# Patient Record
Sex: FEMALE | Race: WHITE | NOT HISPANIC OR LATINO | URBAN - METROPOLITAN AREA
[De-identification: names, ages, dates, MRNs, and addresses within clinical notes are randomized per-mention and may not be internally consistent; named-entity substitution may affect disease eponyms.]

---

## 2017-02-22 ENCOUNTER — OFFICE VISIT (OUTPATIENT)
Dept: FAMILY MEDICINE CLINIC | Facility: CLINIC | Age: 57
End: 2017-02-22

## 2017-02-22 VITALS
OXYGEN SATURATION: 98 % | TEMPERATURE: 99 F | BODY MASS INDEX: 27.05 KG/M2 | HEIGHT: 62 IN | DIASTOLIC BLOOD PRESSURE: 70 MMHG | HEART RATE: 106 BPM | SYSTOLIC BLOOD PRESSURE: 110 MMHG | WEIGHT: 147 LBS

## 2017-02-22 DIAGNOSIS — R50.9 FEVER, UNSPECIFIED FEVER CAUSE: Primary | ICD-10-CM

## 2017-02-22 DIAGNOSIS — J06.9 ACUTE URI: ICD-10-CM

## 2017-02-22 LAB
EXPIRATION DATE: NORMAL
FLUAV AG NPH QL: NEGATIVE
FLUBV AG NPH QL: NEGATIVE
INTERNAL CONTROL: NORMAL
Lab: NORMAL

## 2017-02-22 PROCEDURE — 96372 THER/PROPH/DIAG INJ SC/IM: CPT | Performed by: NURSE PRACTITIONER

## 2017-02-22 PROCEDURE — 99213 OFFICE O/P EST LOW 20 MIN: CPT | Performed by: NURSE PRACTITIONER

## 2017-02-22 PROCEDURE — 87804 INFLUENZA ASSAY W/OPTIC: CPT | Performed by: NURSE PRACTITIONER

## 2017-02-22 RX ORDER — TRIAMCINOLONE ACETONIDE 40 MG/ML
40 INJECTION, SUSPENSION INTRA-ARTICULAR; INTRAMUSCULAR ONCE
Status: COMPLETED | OUTPATIENT
Start: 2017-02-22 | End: 2017-02-22

## 2017-02-22 RX ORDER — SUMATRIPTAN AND NAPROXEN SODIUM 85; 500 MG/1; MG/1
TABLET, FILM COATED ORAL
Qty: 30 TABLET | Refills: 0 | Status: SHIPPED | OUTPATIENT
Start: 2017-02-22 | End: 2017-03-20 | Stop reason: SDUPTHER

## 2017-02-22 RX ADMIN — TRIAMCINOLONE ACETONIDE 40 MG: 40 INJECTION, SUSPENSION INTRA-ARTICULAR; INTRAMUSCULAR at 10:43

## 2017-02-22 NOTE — PROGRESS NOTES
"Subjective   Gaby Dobbins is a 56 y.o. female.     History of Present Illness   Upper Respiratory Infection: Patient complains of symptoms of a URI. Symptoms include congestion, cough, sore throat and swollen glands. Onset of symptoms was 4 days ago, unchanged since that time. She also c/o achiness for the past 4 days .  She is drinking plenty of fluids. Evaluation to date: none. Treatment to date: none.        The following portions of the patient's history were reviewed and updated as appropriate: allergies, current medications, past social history and problem list.    Review of Systems   Constitutional: Positive for fatigue.   HENT: Positive for congestion and sinus pressure.    Respiratory: Positive for cough.    All other systems reviewed and are negative.      Objective   Visit Vitals   • /70 (BP Location: Right arm, Patient Position: Sitting)   • Pulse 106   • Temp 99 °F (37.2 °C)   • Ht 62\" (157.5 cm)   • Wt 147 lb (66.7 kg)   • SpO2 98%   • BMI 26.89 kg/m2     Physical Exam   Constitutional: She is oriented to person, place, and time. Vital signs are normal. She appears well-developed and well-nourished. No distress.   HENT:   Head: Normocephalic.   Right Ear: Tympanic membrane normal.   Left Ear: Tympanic membrane normal.   Nose: Rhinorrhea present.   Mouth/Throat: Oropharynx is clear and moist.   Cardiovascular: Normal rate, regular rhythm and normal heart sounds.    Pulmonary/Chest: Effort normal and breath sounds normal.   Lymphadenopathy:     She has no cervical adenopathy.   Neurological: She is alert and oriented to person, place, and time. Gait normal.   Psychiatric: She has a normal mood and affect. Her behavior is normal. Judgment and thought content normal.   Vitals reviewed.      Assessment/Plan   Problem List Items Addressed This Visit        Respiratory    Acute URI    Relevant Medications    triamcinolone acetonide (KENALOG-40) injection 40 mg (Start on 2/22/2017 11:15 AM)       " Other    Fever - Primary    Relevant Orders    POC Influenza A / B        Push fluids rto prn

## 2017-03-20 RX ORDER — SUMATRIPTAN AND NAPROXEN SODIUM 85; 500 MG/1; MG/1
TABLET, FILM COATED ORAL
Qty: 30 TABLET | Refills: 1 | Status: SHIPPED | OUTPATIENT
Start: 2017-03-20 | End: 2017-09-14 | Stop reason: SDUPTHER

## 2017-04-20 ENCOUNTER — OFFICE VISIT (OUTPATIENT)
Dept: FAMILY MEDICINE CLINIC | Facility: CLINIC | Age: 57
End: 2017-04-20

## 2017-04-20 VITALS
RESPIRATION RATE: 14 BRPM | BODY MASS INDEX: 27.12 KG/M2 | TEMPERATURE: 98.2 F | HEIGHT: 62 IN | OXYGEN SATURATION: 98 % | DIASTOLIC BLOOD PRESSURE: 72 MMHG | WEIGHT: 147.4 LBS | HEART RATE: 98 BPM | SYSTOLIC BLOOD PRESSURE: 106 MMHG

## 2017-04-20 DIAGNOSIS — G43.009 MIGRAINE WITHOUT AURA AND WITHOUT STATUS MIGRAINOSUS, NOT INTRACTABLE: ICD-10-CM

## 2017-04-20 DIAGNOSIS — J40 SINOBRONCHITIS: Primary | ICD-10-CM

## 2017-04-20 DIAGNOSIS — H61.21 IMPACTED CERUMEN OF RIGHT EAR: ICD-10-CM

## 2017-04-20 DIAGNOSIS — J32.9 SINOBRONCHITIS: Primary | ICD-10-CM

## 2017-04-20 PROCEDURE — 99213 OFFICE O/P EST LOW 20 MIN: CPT | Performed by: FAMILY MEDICINE

## 2017-04-20 PROCEDURE — 69210 REMOVE IMPACTED EAR WAX UNI: CPT | Performed by: FAMILY MEDICINE

## 2017-04-20 RX ORDER — AMOXICILLIN AND CLAVULANATE POTASSIUM 875; 125 MG/1; MG/1
1 TABLET, FILM COATED ORAL 2 TIMES DAILY
Qty: 20 TABLET | Refills: 0 | Status: SHIPPED | OUTPATIENT
Start: 2017-04-20 | End: 2017-04-30

## 2017-04-20 RX ORDER — SUMATRIPTAN 25 MG/1
25 TABLET, FILM COATED ORAL ONCE AS NEEDED
Qty: 9 TABLET | Refills: 2 | Status: SHIPPED | OUTPATIENT
Start: 2017-04-20 | End: 2017-11-19 | Stop reason: SDUPTHER

## 2017-04-20 RX ORDER — PREDNISONE 20 MG/1
40 TABLET ORAL DAILY
Qty: 10 TABLET | Refills: 0 | Status: SHIPPED | OUTPATIENT
Start: 2017-04-20 | End: 2019-04-10

## 2017-04-20 RX ORDER — SUMATRIPTAN 25 MG/1
TABLET, FILM COATED ORAL
Qty: 9 TABLET | Refills: 1 | Status: CANCELLED | OUTPATIENT
Start: 2017-04-20

## 2017-04-20 NOTE — PROGRESS NOTES
Subjective   Gaby Dobbins is a 56 y.o. female.     Chief Complaint   Patient presents with   • Headache     Patient has headache and congested .    • Dizziness     Patient has ear pain and started feeling dizzy for 5 days.    • Immunizations     Patient is unsure of last tdap.        HPI     Patient presents with 10 days of increasing  Sinus congestion, headache, bilateral ear pressure and pain, drainage.  She's tried some over-the-counter therapies which have not helped.  Her symptoms are getting worse.    The following portions of the patient's history were reviewed and updated as appropriate: allergies, current medications, past family history, past medical history, past social history, past surgical history and problem list.    Review of Systems   HENT: Positive for ear pain.    Neurological: Positive for dizziness and headaches.   All other systems reviewed and are negative.      Objective  Vitals:    04/20/17 1032   BP: 106/72   Pulse: 98   Resp: 14   Temp: 98.2 °F (36.8 °C)   SpO2: 98%        Physical Exam   Constitutional: She is oriented to person, place, and time. She appears well-developed and well-nourished. No distress.   HENT:   Head: Normocephalic and atraumatic.   Right Ear: External ear normal. No drainage, swelling or tenderness. Tympanic membrane is bulging. Tympanic membrane is not injected and not erythematous. Tympanic membrane mobility is normal. A middle ear effusion ( cerumen impaction) is present. Decreased hearing is noted.   Left Ear: External ear and ear canal normal. No drainage, swelling or tenderness. Tympanic membrane is bulging. Tympanic membrane is not injected and not erythematous. Tympanic membrane mobility is normal.  No middle ear effusion. Decreased hearing is noted.   Nose: Mucosal edema and rhinorrhea present. Right sinus exhibits maxillary sinus tenderness and frontal sinus tenderness. Left sinus exhibits maxillary sinus tenderness and frontal sinus tenderness.    Mouth/Throat: Uvula is midline. Posterior oropharyngeal erythema present. No oropharyngeal exudate, posterior oropharyngeal edema or tonsillar abscesses. No tonsillar exudate.   Eyes: Conjunctivae and EOM are normal. Pupils are equal, round, and reactive to light. Right eye exhibits no discharge. Left eye exhibits no discharge. No scleral icterus.   Neck: Normal range of motion. Neck supple.   Cardiovascular: Normal rate, regular rhythm and normal heart sounds.  Exam reveals no friction rub.    No murmur heard.  Pulmonary/Chest: Effort normal and breath sounds normal. No respiratory distress. She has no wheezes. She has no rales.   Abdominal: Soft. Bowel sounds are normal. She exhibits no distension. There is no tenderness. There is no rebound and no guarding.   Lymphadenopathy:     She has no cervical adenopathy.   Neurological: She is alert and oriented to person, place, and time.   Skin: Skin is warm and dry. She is not diaphoretic.   Nursing note and vitals reviewed.        Current Outpatient Prescriptions:   •  fexofenadine-pseudoephedrine (ALLEGRA-D ALLERGY & CONGESTION) 180-240 MG per 24 hr tablet, Take 1 tablet by mouth Daily., Disp: 90 tablet, Rfl: 3  •  Multiple Vitamin (MULTI VITAMIN) tablet, Take  by mouth., Disp: , Rfl:   •  SUMAtriptan (IMITREX) 25 MG tablet, Take 1 tablet by mouth 1 (One) Time As Needed for migraine for up to 1 dose., Disp: 9 tablet, Rfl: 2  •  SUMAtriptan-naproxen (TREXIMET)  MG per tablet, Take one tablet onset of headache may take 1 tablet 2 hours after first tablet, Disp: 30 tablet, Rfl: 1  •  amoxicillin-clavulanate (AUGMENTIN) 875-125 MG per tablet, Take 1 tablet by mouth 2 (Two) Times a Day for 10 days. 1 tab PO BID x 10 days for infection, Disp: 20 tablet, Rfl: 0  •  predniSONE (DELTASONE) 20 MG tablet, Take 2 tablets by mouth Daily., Disp: 10 tablet, Rfl: 0    Ear Cerumen Removal Instrumentation  Date/Time: 4/20/2017 11:02 AM  Performed by: MIGUEL ENRIQUEZ  by: GARRET ENRIQUEZ  Consent: Verbal consent obtained.  Risks and benefits: risks, benefits and alternatives were discussed  Consent given by: patient  Patient identity confirmed: verbally with patient  Local anesthetic: none  Location details: right ear  Procedure type: curette  Patient tolerance: Patient tolerated the procedure well with no immediate complications          Lab Results (most recent)     None          Assessment/Plan   Gaby was seen today for headache, dizziness and immunizations.    Diagnoses and all orders for this visit:    Sinobronchitis  -     amoxicillin-clavulanate (AUGMENTIN) 875-125 MG per tablet; Take 1 tablet by mouth 2 (Two) Times a Day for 10 days. 1 tab PO BID x 10 days for infection  -     predniSONE (DELTASONE) 20 MG tablet; Take 2 tablets by mouth Daily.    Impacted cerumen of right ear  -     Ear Cerumen Removal Instrumentation      Treatment as above. Cerumen removed.    Return for As needed.      Garret Enriquez MD

## 2017-09-14 RX ORDER — SUMATRIPTAN AND NAPROXEN SODIUM 85; 500 MG/1; MG/1
TABLET, FILM COATED ORAL
Qty: 30 TABLET | Refills: 1 | Status: SHIPPED | OUTPATIENT
Start: 2017-09-14 | End: 2018-02-22 | Stop reason: SDUPTHER

## 2017-11-19 DIAGNOSIS — G43.009 MIGRAINE WITHOUT AURA AND WITHOUT STATUS MIGRAINOSUS, NOT INTRACTABLE: ICD-10-CM

## 2017-11-20 RX ORDER — SUMATRIPTAN 25 MG/1
TABLET, FILM COATED ORAL
Qty: 9 TABLET | Refills: 1 | Status: SHIPPED | OUTPATIENT
Start: 2017-11-20 | End: 2020-07-08

## 2017-12-19 DIAGNOSIS — Z91.09 ENVIRONMENTAL ALLERGIES: ICD-10-CM

## 2017-12-19 RX ORDER — FEXOFENADINE HCL AND PSEUDOEPHEDRINE HCI 180; 240 MG/1; MG/1
TABLET, EXTENDED RELEASE ORAL
Qty: 80 TABLET | Refills: 2 | Status: SHIPPED | OUTPATIENT
Start: 2017-12-19 | End: 2019-04-10 | Stop reason: SDUPTHER

## 2018-02-22 RX ORDER — SUMATRIPTAN AND NAPROXEN SODIUM 85; 500 MG/1; MG/1
TABLET, FILM COATED ORAL
Qty: 30 TABLET | Refills: 1 | Status: SHIPPED | OUTPATIENT
Start: 2018-02-22 | End: 2019-03-07 | Stop reason: SDUPTHER

## 2018-05-20 DIAGNOSIS — G43.009 MIGRAINE WITHOUT AURA AND WITHOUT STATUS MIGRAINOSUS, NOT INTRACTABLE: ICD-10-CM

## 2018-05-21 RX ORDER — SUMATRIPTAN 25 MG/1
TABLET, FILM COATED ORAL
Qty: 9 TABLET | Refills: 0 | OUTPATIENT
Start: 2018-05-21

## 2019-03-08 RX ORDER — SUMATRIPTAN AND NAPROXEN SODIUM 85; 500 MG/1; MG/1
TABLET, FILM COATED ORAL
Qty: 9 TABLET | Refills: 3 | Status: SHIPPED | OUTPATIENT
Start: 2019-03-08 | End: 2019-04-10 | Stop reason: SDUPTHER

## 2019-04-10 ENCOUNTER — OFFICE VISIT (OUTPATIENT)
Dept: FAMILY MEDICINE CLINIC | Facility: CLINIC | Age: 59
End: 2019-04-10

## 2019-04-10 ENCOUNTER — PRIOR AUTHORIZATION (OUTPATIENT)
Dept: FAMILY MEDICINE CLINIC | Facility: CLINIC | Age: 59
End: 2019-04-10

## 2019-04-10 VITALS
SYSTOLIC BLOOD PRESSURE: 114 MMHG | DIASTOLIC BLOOD PRESSURE: 72 MMHG | OXYGEN SATURATION: 99 % | WEIGHT: 149 LBS | HEIGHT: 62 IN | BODY MASS INDEX: 27.42 KG/M2 | TEMPERATURE: 97.8 F | HEART RATE: 72 BPM

## 2019-04-10 DIAGNOSIS — Z91.09 ENVIRONMENTAL ALLERGIES: ICD-10-CM

## 2019-04-10 DIAGNOSIS — Z13.1 SCREENING FOR DIABETES MELLITUS: ICD-10-CM

## 2019-04-10 DIAGNOSIS — Z13.0 SCREENING FOR IRON DEFICIENCY ANEMIA: ICD-10-CM

## 2019-04-10 DIAGNOSIS — Z13.6 SCREENING FOR ISCHEMIC HEART DISEASE: ICD-10-CM

## 2019-04-10 DIAGNOSIS — G43.009 MIGRAINE WITHOUT AURA AND WITHOUT STATUS MIGRAINOSUS, NOT INTRACTABLE: ICD-10-CM

## 2019-04-10 DIAGNOSIS — Z00.00 ROUTINE GENERAL MEDICAL EXAMINATION AT A HEALTH CARE FACILITY: Primary | ICD-10-CM

## 2019-04-10 PROBLEM — R50.9 FEVER: Status: RESOLVED | Noted: 2017-02-22 | Resolved: 2019-04-10

## 2019-04-10 PROBLEM — J06.9 ACUTE URI: Status: RESOLVED | Noted: 2017-02-22 | Resolved: 2019-04-10

## 2019-04-10 LAB
BILIRUB BLD-MCNC: NEGATIVE MG/DL
CLARITY, POC: CLEAR
COLOR UR: YELLOW
GLUCOSE UR STRIP-MCNC: NEGATIVE MG/DL
KETONES UR QL: NEGATIVE
LEUKOCYTE EST, POC: NEGATIVE
NITRITE UR-MCNC: NEGATIVE MG/ML
PH UR: 5.5 [PH] (ref 5–8)
PROT UR STRIP-MCNC: NEGATIVE MG/DL
RBC # UR STRIP: ABNORMAL /UL
SP GR UR: 1.02 (ref 1–1.03)
UROBILINOGEN UR QL: NORMAL

## 2019-04-10 PROCEDURE — 99396 PREV VISIT EST AGE 40-64: CPT | Performed by: NURSE PRACTITIONER

## 2019-04-10 PROCEDURE — 81003 URINALYSIS AUTO W/O SCOPE: CPT | Performed by: NURSE PRACTITIONER

## 2019-04-10 RX ORDER — SUMATRIPTAN AND NAPROXEN SODIUM 85; 500 MG/1; MG/1
TABLET, FILM COATED ORAL
Qty: 9 TABLET | Refills: 6 | Status: SHIPPED | OUTPATIENT
Start: 2019-04-10 | End: 2019-04-24 | Stop reason: SDUPTHER

## 2019-04-10 RX ORDER — FEXOFENADINE HCL AND PSEUDOEPHEDRINE HCI 180; 240 MG/1; MG/1
1 TABLET, EXTENDED RELEASE ORAL DAILY
Qty: 90 TABLET | Refills: 3 | Status: SHIPPED | OUTPATIENT
Start: 2019-04-10 | End: 2020-07-08 | Stop reason: SDUPTHER

## 2019-04-10 NOTE — PROGRESS NOTES
"Subjective   Gaby Dobbins is a 58 y.o. female.     History of Present Illness   Well Adult Physical: Patient here for a comprehensive physical exam.The patient reports no problems  Do you take any herbs or supplements that were not prescribed by a doctor? no Are you taking calcium supplements? no Are you taking aspirin daily? no    Mammogram- Pt is not up to date will make appointment with OBGYN  Pap Smear- Pt is not up to date  Colonoscopy- Pt is not up to date has apppointment  Pneumonia vaccine -Pt is not up to date  Shingles vaccine- Pt is not up to date  Tdap- Pt is not up to date      The following portions of the patient's history were reviewed and updated as appropriate: allergies, current medications, past social history and problem list.    Review of Systems   All other systems reviewed and are negative.      Objective   /72 (BP Location: Left arm, Patient Position: Sitting)   Pulse 72   Temp 97.8 °F (36.6 °C)   Ht 157.5 cm (62\")   Wt 67.6 kg (149 lb)   SpO2 99%   BMI 27.25 kg/m²   Physical Exam   Constitutional: She is oriented to person, place, and time. Vital signs are normal. She appears well-developed and well-nourished. No distress.   HENT:   Head: Normocephalic and atraumatic. Hair is normal.   Right Ear: Hearing, tympanic membrane, external ear and ear canal normal. No drainage. No decreased hearing is noted.   Left Ear: Hearing, tympanic membrane, external ear and ear canal normal. No decreased hearing is noted.   Nose: No nasal deformity.   Mouth/Throat: Oropharynx is clear and moist.   Eyes: Conjunctivae, EOM and lids are normal. Pupils are equal, round, and reactive to light. Right eye exhibits no discharge. Left eye exhibits no discharge.   Neck: Normal range of motion. Neck supple. No JVD present. No tracheal deviation present. No thyromegaly present.   Cardiovascular: Normal rate, regular rhythm, normal heart sounds, intact distal pulses and normal pulses. Exam reveals no " gallop and no friction rub.   No murmur heard.  Pulmonary/Chest: Effort normal and breath sounds normal. No respiratory distress. She has no wheezes. She has no rales. She exhibits no tenderness.   Abdominal: Soft. Bowel sounds are normal. She exhibits no distension and no mass. There is no tenderness. There is no rebound and no guarding. No hernia.   Musculoskeletal: Normal range of motion. She exhibits no edema, tenderness or deformity.   Lymphadenopathy:     She has no cervical adenopathy.   Neurological: She is alert and oriented to person, place, and time. She has normal reflexes. She displays normal reflexes. No cranial nerve deficit. She exhibits normal muscle tone. Coordination and gait normal.   Skin: Skin is warm and dry. No rash noted. She is not diaphoretic. No erythema.   Psychiatric: She has a normal mood and affect. Her behavior is normal. Judgment and thought content normal.   Vitals reviewed.      Assessment/Plan      Diagnosis Plan   1. Routine general medical examination at a health care facility  POC Urinalysis Dipstick, Automated   2. Screening for diabetes mellitus  Comprehensive Metabolic Panel   3. Screening for iron deficiency anemia  CBC & Differential   4. Screening for ischemic heart disease  Lipid Panel With LDL / HDL Ratio   5. Migraine without aura and without status migrainosus, not intractable  SUMAtriptan-naproxen (TREXIMET)  MG per tablet     Discussed weight, diet and exercise  Follow up after labs      KELLY Herman  4/10/2019

## 2019-04-11 LAB
ALBUMIN SERPL-MCNC: 4.8 G/DL (ref 3.5–5.2)
ALBUMIN/GLOB SERPL: 2.1 G/DL
ALP SERPL-CCNC: 73 U/L (ref 39–117)
ALT SERPL-CCNC: 14 U/L (ref 1–33)
AST SERPL-CCNC: 21 U/L (ref 1–32)
BASOPHILS # BLD AUTO: 0.03 10*3/MM3 (ref 0–0.2)
BASOPHILS NFR BLD AUTO: 0.6 % (ref 0–1.5)
BILIRUB SERPL-MCNC: 0.5 MG/DL (ref 0.2–1.2)
BUN SERPL-MCNC: 22 MG/DL (ref 6–20)
BUN/CREAT SERPL: 25.9 (ref 7–25)
CALCIUM SERPL-MCNC: 10.1 MG/DL (ref 8.6–10.5)
CHLORIDE SERPL-SCNC: 103 MMOL/L (ref 98–107)
CHOLEST SERPL-MCNC: 214 MG/DL (ref 0–200)
CO2 SERPL-SCNC: 29.2 MMOL/L (ref 22–29)
CREAT SERPL-MCNC: 0.85 MG/DL (ref 0.57–1)
EOSINOPHIL # BLD AUTO: 0.38 10*3/MM3 (ref 0–0.4)
EOSINOPHIL NFR BLD AUTO: 7.7 % (ref 0.3–6.2)
ERYTHROCYTE [DISTWIDTH] IN BLOOD BY AUTOMATED COUNT: 12.7 % (ref 12.3–15.4)
GLOBULIN SER CALC-MCNC: 2.3 GM/DL
GLUCOSE SERPL-MCNC: 85 MG/DL (ref 65–99)
HCT VFR BLD AUTO: 43.7 % (ref 34–46.6)
HDLC SERPL-MCNC: 89 MG/DL (ref 40–60)
HGB BLD-MCNC: 14.2 G/DL (ref 12–15.9)
IMM GRANULOCYTES # BLD AUTO: 0.01 10*3/MM3 (ref 0–0.05)
IMM GRANULOCYTES NFR BLD AUTO: 0.2 % (ref 0–0.5)
LDLC SERPL CALC-MCNC: 112 MG/DL (ref 0–100)
LDLC/HDLC SERPL: 1.26 {RATIO}
LYMPHOCYTES # BLD AUTO: 1.46 10*3/MM3 (ref 0.7–3.1)
LYMPHOCYTES NFR BLD AUTO: 29.7 % (ref 19.6–45.3)
MCH RBC QN AUTO: 29.5 PG (ref 26.6–33)
MCHC RBC AUTO-ENTMCNC: 32.5 G/DL (ref 31.5–35.7)
MCV RBC AUTO: 90.7 FL (ref 79–97)
MONOCYTES # BLD AUTO: 0.47 10*3/MM3 (ref 0.1–0.9)
MONOCYTES NFR BLD AUTO: 9.6 % (ref 5–12)
NEUTROPHILS # BLD AUTO: 2.57 10*3/MM3 (ref 1.4–7)
NEUTROPHILS NFR BLD AUTO: 52.2 % (ref 42.7–76)
NRBC BLD AUTO-RTO: 0 /100 WBC (ref 0–0)
PLATELET # BLD AUTO: 225 10*3/MM3 (ref 140–450)
POTASSIUM SERPL-SCNC: 4.4 MMOL/L (ref 3.5–5.2)
PROT SERPL-MCNC: 7.1 G/DL (ref 6–8.5)
RBC # BLD AUTO: 4.82 10*6/MM3 (ref 3.77–5.28)
SODIUM SERPL-SCNC: 141 MMOL/L (ref 136–145)
TRIGL SERPL-MCNC: 66 MG/DL (ref 0–150)
VLDLC SERPL CALC-MCNC: 13.2 MG/DL
WBC # BLD AUTO: 4.92 10*3/MM3 (ref 3.4–10.8)

## 2019-04-24 DIAGNOSIS — G43.009 MIGRAINE WITHOUT AURA AND WITHOUT STATUS MIGRAINOSUS, NOT INTRACTABLE: ICD-10-CM

## 2019-04-24 RX ORDER — SUMATRIPTAN AND NAPROXEN SODIUM 85; 500 MG/1; MG/1
TABLET, FILM COATED ORAL
Qty: 9 TABLET | Refills: 6 | Status: SHIPPED | OUTPATIENT
Start: 2019-04-24 | End: 2019-04-26 | Stop reason: SDUPTHER

## 2019-04-26 ENCOUNTER — TELEPHONE (OUTPATIENT)
Dept: FAMILY MEDICINE CLINIC | Facility: CLINIC | Age: 59
End: 2019-04-26

## 2019-04-26 DIAGNOSIS — G43.009 MIGRAINE WITHOUT AURA AND WITHOUT STATUS MIGRAINOSUS, NOT INTRACTABLE: ICD-10-CM

## 2019-04-26 RX ORDER — SUMATRIPTAN AND NAPROXEN SODIUM 85; 500 MG/1; MG/1
TABLET, FILM COATED ORAL
Qty: 4 TABLET | Refills: 0 | Status: SHIPPED | OUTPATIENT
Start: 2019-04-26 | End: 2020-07-07 | Stop reason: SDUPTHER

## 2019-04-26 NOTE — TELEPHONE ENCOUNTER
She also asked if she is supposed to just take that for her migraines? Concerned about the weekend and wanting to know what to do about her migraines

## 2019-04-26 NOTE — TELEPHONE ENCOUNTER
Pt is requesting a short time supply for SUMAtriptan-naproxen (TREXIMET)  MG per tablet.   She has had a headache for 3 days and is about to run out, a new rx has been sent to her mail order but she will run out before then. - [facility]  I do see on the sig it states do not exceed 2 tablets within 24 hours and I relayed this to pt but she is asking how much she can take of this medication as sometimes even after she takes 2 she still has migraines.

## 2020-05-19 DIAGNOSIS — G43.009 MIGRAINE WITHOUT AURA AND WITHOUT STATUS MIGRAINOSUS, NOT INTRACTABLE: ICD-10-CM

## 2020-05-20 DIAGNOSIS — G43.009 MIGRAINE WITHOUT AURA AND WITHOUT STATUS MIGRAINOSUS, NOT INTRACTABLE: ICD-10-CM

## 2020-05-20 DIAGNOSIS — Z91.09 ENVIRONMENTAL ALLERGIES: ICD-10-CM

## 2020-05-20 RX ORDER — SUMATRIPTAN AND NAPROXEN SODIUM 85; 500 MG/1; MG/1
TABLET, FILM COATED ORAL
Qty: 9 TABLET | OUTPATIENT
Start: 2020-05-20

## 2020-05-21 RX ORDER — FEXOFENADINE HCL AND PSEUDOEPHEDRINE HCI 180; 240 MG/1; MG/1
1 TABLET, EXTENDED RELEASE ORAL DAILY
Qty: 90 TABLET | Refills: 3 | OUTPATIENT
Start: 2020-05-21

## 2020-05-21 RX ORDER — SUMATRIPTAN AND NAPROXEN SODIUM 85; 500 MG/1; MG/1
TABLET, FILM COATED ORAL
Qty: 4 TABLET | Refills: 0 | OUTPATIENT
Start: 2020-05-21

## 2020-07-07 DIAGNOSIS — G43.009 MIGRAINE WITHOUT AURA AND WITHOUT STATUS MIGRAINOSUS, NOT INTRACTABLE: ICD-10-CM

## 2020-07-07 RX ORDER — SUMATRIPTAN AND NAPROXEN SODIUM 85; 500 MG/1; MG/1
TABLET, FILM COATED ORAL
Qty: 4 TABLET | Refills: 0 | Status: SHIPPED | OUTPATIENT
Start: 2020-07-07 | End: 2020-07-08 | Stop reason: SDUPTHER

## 2020-07-08 ENCOUNTER — OFFICE VISIT (OUTPATIENT)
Dept: FAMILY MEDICINE CLINIC | Facility: CLINIC | Age: 60
End: 2020-07-08

## 2020-07-08 VITALS
WEIGHT: 148.8 LBS | HEART RATE: 101 BPM | DIASTOLIC BLOOD PRESSURE: 72 MMHG | HEIGHT: 62 IN | BODY MASS INDEX: 27.38 KG/M2 | OXYGEN SATURATION: 98 % | TEMPERATURE: 98.2 F | SYSTOLIC BLOOD PRESSURE: 118 MMHG

## 2020-07-08 DIAGNOSIS — G43.009 MIGRAINE WITHOUT AURA AND WITHOUT STATUS MIGRAINOSUS, NOT INTRACTABLE: ICD-10-CM

## 2020-07-08 DIAGNOSIS — Z91.09 ENVIRONMENTAL ALLERGIES: ICD-10-CM

## 2020-07-08 PROBLEM — Z13.1 SCREENING FOR DIABETES MELLITUS: Status: RESOLVED | Noted: 2019-04-10 | Resolved: 2020-07-08

## 2020-07-08 PROBLEM — Z13.6 SCREENING FOR ISCHEMIC HEART DISEASE: Status: RESOLVED | Noted: 2019-04-10 | Resolved: 2020-07-08

## 2020-07-08 PROCEDURE — 99213 OFFICE O/P EST LOW 20 MIN: CPT | Performed by: NURSE PRACTITIONER

## 2020-07-08 RX ORDER — SUMATRIPTAN AND NAPROXEN SODIUM 85; 500 MG/1; MG/1
TABLET, FILM COATED ORAL
Qty: 27 TABLET | Refills: 3 | Status: SHIPPED | OUTPATIENT
Start: 2020-07-08 | End: 2020-10-07 | Stop reason: SDUPTHER

## 2020-07-08 RX ORDER — FEXOFENADINE HCL AND PSEUDOEPHEDRINE HCI 180; 240 MG/1; MG/1
1 TABLET, EXTENDED RELEASE ORAL DAILY
Qty: 90 TABLET | Refills: 0 | Status: SHIPPED | OUTPATIENT
Start: 2020-07-08 | End: 2020-08-06 | Stop reason: SDUPTHER

## 2020-07-08 RX ORDER — FEXOFENADINE HCL AND PSEUDOEPHEDRINE HCI 180; 240 MG/1; MG/1
1 TABLET, EXTENDED RELEASE ORAL DAILY
Qty: 90 TABLET | Refills: 3 | Status: SHIPPED | OUTPATIENT
Start: 2020-07-08 | End: 2020-07-08

## 2020-07-08 NOTE — PROGRESS NOTES
"Subjective   Gaby Dobbins is a 60 y.o. female.     History of Present Illness   Patient presents office today for refills of her migraine medication which she uses a PRN basis and working well to control her migraines.  She probably has 1-4 migraines per month.  She is also needing a refill of her Allegra-D which she is using daily to help with her allergies working well without side effects.  No other problems or complaints today.  The following portions of the patient's history were reviewed and updated as appropriate: allergies, current medications, past social history and problem list.    Review of Systems   Constitutional: Negative for fever.   Respiratory: Negative for cough and shortness of breath.    Cardiovascular: Negative for chest pain.   Gastrointestinal: Negative for abdominal pain.   Neurological: Negative for dizziness.       Objective   /72 (BP Location: Right arm, Patient Position: Sitting)   Pulse 101   Temp 98.2 °F (36.8 °C)   Ht 157.5 cm (62\")   Wt 67.5 kg (148 lb 12.8 oz)   SpO2 98%   BMI 27.22 kg/m²   Physical Exam   Constitutional: She is oriented to person, place, and time. Vital signs are normal. She appears well-developed and well-nourished. No distress.   HENT:   Head: Normocephalic.   Cardiovascular: Normal rate, regular rhythm and normal heart sounds.   Pulmonary/Chest: Effort normal and breath sounds normal.   Neurological: She is alert and oriented to person, place, and time. Gait normal.   Psychiatric: She has a normal mood and affect. Her behavior is normal. Judgment and thought content normal.   Vitals reviewed.      Assessment/Plan      Diagnosis Plan   1. Migraine without aura and without status migrainosus, not intractable  SUMAtriptan-naproxen (TREXIMET)  MG per tablet   2. Environmental allergies  fexofenadine-pseudoephedrine (ALLEGRA-D 24) 180-240 MG per 24 hr tablet     Cont same   rto prn      KELLY Herman  7/8/2020  "

## 2020-07-21 ENCOUNTER — PRIOR AUTHORIZATION (OUTPATIENT)
Dept: FAMILY MEDICINE CLINIC | Facility: CLINIC | Age: 60
End: 2020-07-21

## 2020-08-06 DIAGNOSIS — Z91.09 ENVIRONMENTAL ALLERGIES: ICD-10-CM

## 2020-08-06 RX ORDER — FEXOFENADINE HCL AND PSEUDOEPHEDRINE HCI 180; 240 MG/1; MG/1
1 TABLET, EXTENDED RELEASE ORAL DAILY
Qty: 90 TABLET | Refills: 0 | Status: SHIPPED | OUTPATIENT
Start: 2020-08-06 | End: 2022-05-03 | Stop reason: SDUPTHER

## 2020-09-30 ENCOUNTER — TELEPHONE (OUTPATIENT)
Dept: FAMILY MEDICINE CLINIC | Facility: CLINIC | Age: 60
End: 2020-09-30

## 2020-09-30 NOTE — TELEPHONE ENCOUNTER
MS. DOBBINS SAYS THAT SHE IS NEEDING A PRIOR AUTH FOR  THE FOLLOWING MED, SAYS SHE SPOKE TO DAY REGARDING THIS PRIOR AUTH. INSURANCE SAYS THAT THE OFFICE SHOULD CALL [employer] [phone] [employer] CASE # [id number]     SAYS SHE WAS RECOMMENDED [facility], BUT MED DENIED. SHE IS NOW NEEDING THE PRIOR AUTH DONE THRU [facility].   PLEASE ADVISE Gaby Dobbins  [phone]     SUMAtriptan-naproxen (TREXIMET)  MG per tablet   3 ordered         Summary: TAKE 1 TABLET BY MOUTH AT ONSET OF MIGRAINE, MAY REPEAT IN 2 HOURS IF NEEDED; DO NOT EXCEED 2 TABLETS/24HRS, Normal

## 2020-09-30 NOTE — TELEPHONE ENCOUNTER
PA was denied in July for this medication a [facility] waiting on insurance to respond with determination

## 2020-10-07 ENCOUNTER — PATIENT MESSAGE (OUTPATIENT)
Dept: FAMILY MEDICINE CLINIC | Facility: CLINIC | Age: 60
End: 2020-10-07

## 2020-10-07 DIAGNOSIS — G43.009 MIGRAINE WITHOUT AURA AND WITHOUT STATUS MIGRAINOSUS, NOT INTRACTABLE: ICD-10-CM

## 2020-10-07 RX ORDER — SUMATRIPTAN AND NAPROXEN SODIUM 85; 500 MG/1; MG/1
TABLET, FILM COATED ORAL
Qty: 27 TABLET | Refills: 3 | Status: SHIPPED | OUTPATIENT
Start: 2020-10-07 | End: 2022-03-17

## 2020-10-07 NOTE — TELEPHONE ENCOUNTER
Caller: Shilpi Gaby BRIAN    Relationship: Self    Best call back number: [phone]     Medication needed:   Requested Prescriptions     Pending Prescriptions Disp Refills   • SUMAtriptan-naproxen (TREXIMET)  MG per tablet 27 tablet 3     Sig: TAKE 1 TABLET BY MOUTH AT ONSET OF MIGRAINE, MAY REPEAT IN 2 HOURS IF NEEDED; DO NOT EXCEED 2 TABLETS/24HRS       When do you need the refill by: TODAY    What details did the patient provide when requesting the medication:   MS. DOBBINS SAYS THAT SHE ONLY HAS 1 PILL LEFT. MS. DOBBINS SAYS THAT SHE WAS EXPECTING A CALL BACK ON STATUS REGARDING TREXIMET SHE CALLED ABOUT ON 9/30/2020 MS. DOBBINS IS LEAVING TOWN ON 10/9/2020.   SHE WOULD ALSO LIKE TO KNOW IF THE OFFICE HAS SAMPLES OF THE TREXIMET.     PLEASE CONTACT [phone] Gaby Dobbins    Does the patient have less than a 3 day supply:  [x] Yes  [] No    What is the patient's preferred pharmacy: [facility]  [place]  [address]  - [phone] [facility] [phone] FX

## 2020-10-08 ENCOUNTER — TELEPHONE (OUTPATIENT)
Dept: FAMILY MEDICINE CLINIC | Facility: CLINIC | Age: 60
End: 2020-10-08

## 2020-10-08 NOTE — TELEPHONE ENCOUNTER
PATIENT IS CALLING BECAUSE THIS MEDICATION SUMAtriptan-naproxen (TREXIMET)  MG per tablet  IS STILL BEING DENIED BY [employer].      WHAT IS NEEDING TO BE CORRECTED IS THAT THE FORM ASK IF THE PATIENT HAD EVER TAKEN IS MEDICATION BEFORE AND IT WAS MARKED NO.    BUT THE PATIENT HAS TAKEN ANOTHER FORM OF THIS MEDICATION BEFORE AND  IT SHOULD HAVE BEEN CHECKED YES         PATIENT IS NEEDING SOMEONE TO GET IN CONTACT WITH THE [employer]    [facility]  @[phone]        PT CONTACT@[phone]

## 2020-10-08 NOTE — TELEPHONE ENCOUNTER
A PA was resubmitted listing that other medications have been tried waiting on insurance to respond with determination

## 2022-03-16 DIAGNOSIS — G43.009 MIGRAINE WITHOUT AURA AND WITHOUT STATUS MIGRAINOSUS, NOT INTRACTABLE: ICD-10-CM

## 2022-03-17 RX ORDER — SUMATRIPTAN AND NAPROXEN SODIUM 85; 500 MG/1; MG/1
TABLET, FILM COATED ORAL
Qty: 27 TABLET | Refills: 0 | Status: SHIPPED | OUTPATIENT
Start: 2022-03-17 | End: 2022-10-19 | Stop reason: SDUPTHER

## 2022-05-03 ENCOUNTER — OFFICE VISIT (OUTPATIENT)
Dept: FAMILY MEDICINE CLINIC | Facility: CLINIC | Age: 62
End: 2022-05-03

## 2022-05-03 VITALS
HEART RATE: 98 BPM | SYSTOLIC BLOOD PRESSURE: 118 MMHG | BODY MASS INDEX: 25.76 KG/M2 | TEMPERATURE: 97.3 F | DIASTOLIC BLOOD PRESSURE: 82 MMHG | HEIGHT: 62 IN | OXYGEN SATURATION: 100 % | WEIGHT: 140 LBS

## 2022-05-03 DIAGNOSIS — Z00.00 ROUTINE GENERAL MEDICAL EXAMINATION AT A HEALTH CARE FACILITY: Primary | ICD-10-CM

## 2022-05-03 DIAGNOSIS — Z13.0 SCREENING FOR IRON DEFICIENCY ANEMIA: ICD-10-CM

## 2022-05-03 DIAGNOSIS — Z91.09 ENVIRONMENTAL ALLERGIES: ICD-10-CM

## 2022-05-03 DIAGNOSIS — G43.009 MIGRAINE WITHOUT AURA AND WITHOUT STATUS MIGRAINOSUS, NOT INTRACTABLE: ICD-10-CM

## 2022-05-03 DIAGNOSIS — Z13.6 SCREENING FOR ISCHEMIC HEART DISEASE: ICD-10-CM

## 2022-05-03 DIAGNOSIS — Z13.1 SCREENING FOR DIABETES MELLITUS: ICD-10-CM

## 2022-05-03 PROCEDURE — 99396 PREV VISIT EST AGE 40-64: CPT | Performed by: NURSE PRACTITIONER

## 2022-05-03 RX ORDER — FEXOFENADINE HCL AND PSEUDOEPHEDRINE HCI 180; 240 MG/1; MG/1
1 TABLET, EXTENDED RELEASE ORAL DAILY
Qty: 90 TABLET | Refills: 1 | Status: SHIPPED | OUTPATIENT
Start: 2022-05-03 | End: 2023-03-29 | Stop reason: SDUPTHER

## 2022-05-03 RX ORDER — FEXOFENADINE HCL AND PSEUDOEPHEDRINE HCI 180; 240 MG/1; MG/1
1 TABLET, EXTENDED RELEASE ORAL DAILY
Qty: 90 TABLET | Refills: 1 | Status: SHIPPED | OUTPATIENT
Start: 2022-05-03 | End: 2022-05-03 | Stop reason: SDUPTHER

## 2022-05-03 NOTE — PROGRESS NOTES
"Chief Complaint  Annual Exam (Pt has not been fasting for full labs )    Subjective          Gaby Dobbins presents to [facility]  History of Present Illness  Well Adult Physical: Patient here for a comprehensive physical exam.The patient reports no problems  Do you take any herbs or supplements that were not prescribed by a doctor? no Are you taking calcium supplements? no Are you taking aspirin daily? no      Objective   Vital Signs:   /82   Pulse 98   Temp 97.3 °F (36.3 °C)   Ht 157.5 cm (62\")   Wt 63.5 kg (140 lb)   SpO2 100%   BMI 25.61 kg/m²     Physical Exam  Vitals reviewed.   Constitutional:       General: She is not in acute distress.     Appearance: She is well-developed. She is not diaphoretic.   HENT:      Head: Normocephalic and atraumatic. Hair is normal.      Right Ear: Hearing, tympanic membrane, ear canal and external ear normal. No decreased hearing noted. No drainage.      Left Ear: Hearing, tympanic membrane, ear canal and external ear normal. No decreased hearing noted.      Nose: No nasal deformity.   Eyes:      General: Lids are normal.         Right eye: No discharge.         Left eye: No discharge.      Conjunctiva/sclera: Conjunctivae normal.      Pupils: Pupils are equal, round, and reactive to light.   Neck:      Thyroid: No thyromegaly.      Vascular: No JVD.      Trachea: No tracheal deviation.   Cardiovascular:      Rate and Rhythm: Normal rate and regular rhythm.      Pulses: Normal pulses.      Heart sounds: Normal heart sounds. No murmur heard.    No friction rub. No gallop.   Pulmonary:      Effort: Pulmonary effort is normal. No respiratory distress.      Breath sounds: Normal breath sounds. No wheezing or rales.   Chest:      Chest wall: No tenderness.   Abdominal:      General: Bowel sounds are normal. There is no distension.      Palpations: Abdomen is soft. There is no mass.      Tenderness: There is no abdominal tenderness. " There is no guarding or rebound.      Hernia: No hernia is present.   Musculoskeletal:         General: No tenderness or deformity. Normal range of motion.      Cervical back: Normal range of motion and neck supple.   Lymphadenopathy:      Cervical: No cervical adenopathy.   Skin:     General: Skin is warm and dry.      Findings: No erythema or rash.   Neurological:      Mental Status: She is alert and oriented to person, place, and time.      Cranial Nerves: No cranial nerve deficit.      Motor: No abnormal muscle tone.      Coordination: Coordination normal.      Deep Tendon Reflexes: Reflexes are normal and symmetric. Reflexes normal.   Psychiatric:         Behavior: Behavior normal.         Thought Content: Thought content normal.         Judgment: Judgment normal.        Result Review :                 Assessment and Plan    Diagnoses and all orders for this visit:    1. Routine general medical examination at a health care facility (Primary)    2. Screening for iron deficiency anemia  -     CBC & Differential    3. Screening for diabetes mellitus  -     Comprehensive Metabolic Panel    4. Screening for ischemic heart disease  -     Lipid Panel With LDL / HDL Ratio               Follow Up   Return if symptoms worsen or fail to improve.  Patient was given instructions and counseling regarding her condition or for health maintenance advice. Please see specific information pulled into the AVS if appropriate.     Discussed weight, diet and exercise  Follow up after labs    Mask and googles worn

## 2022-06-07 LAB
ALBUMIN SERPL-MCNC: 4.2 G/DL (ref 3.8–4.9)
ALBUMIN/GLOB SERPL: 1.8 {RATIO} (ref 1.2–2.2)
ALP SERPL-CCNC: 87 IU/L (ref 44–121)
ALT SERPL-CCNC: 25 IU/L (ref 0–32)
AST SERPL-CCNC: 27 IU/L (ref 0–40)
BASOPHILS # BLD AUTO: 0 X10E3/UL (ref 0–0.2)
BASOPHILS NFR BLD AUTO: 1 %
BILIRUB SERPL-MCNC: 0.3 MG/DL (ref 0–1.2)
BUN SERPL-MCNC: 16 MG/DL (ref 8–27)
BUN/CREAT SERPL: 21 (ref 12–28)
CALCIUM SERPL-MCNC: 9.5 MG/DL (ref 8.7–10.3)
CHLORIDE SERPL-SCNC: 106 MMOL/L (ref 96–106)
CHOLEST SERPL-MCNC: 224 MG/DL (ref 100–199)
CO2 SERPL-SCNC: 25 MMOL/L (ref 20–29)
CREAT SERPL-MCNC: 0.75 MG/DL (ref 0.57–1)
EGFRCR SERPLBLD CKD-EPI 2021: 91 ML/MIN/1.73
EOSINOPHIL # BLD AUTO: 0.3 X10E3/UL (ref 0–0.4)
EOSINOPHIL NFR BLD AUTO: 6 %
ERYTHROCYTE [DISTWIDTH] IN BLOOD BY AUTOMATED COUNT: 13.7 % (ref 11.7–15.4)
GLOBULIN SER CALC-MCNC: 2.3 G/DL (ref 1.5–4.5)
GLUCOSE SERPL-MCNC: 86 MG/DL (ref 65–99)
HCT VFR BLD AUTO: 37.3 % (ref 34–46.6)
HDLC SERPL-MCNC: 74 MG/DL
HGB BLD-MCNC: 12 G/DL (ref 11.1–15.9)
IMM GRANULOCYTES # BLD AUTO: 0 X10E3/UL (ref 0–0.1)
IMM GRANULOCYTES NFR BLD AUTO: 0 %
LDLC SERPL CALC-MCNC: 131 MG/DL (ref 0–99)
LDLC/HDLC SERPL: 1.8 RATIO (ref 0–3.2)
LYMPHOCYTES # BLD AUTO: 2 X10E3/UL (ref 0.7–3.1)
LYMPHOCYTES NFR BLD AUTO: 38 %
MCH RBC QN AUTO: 28.1 PG (ref 26.6–33)
MCHC RBC AUTO-ENTMCNC: 32.2 G/DL (ref 31.5–35.7)
MCV RBC AUTO: 87 FL (ref 79–97)
MONOCYTES # BLD AUTO: 0.5 X10E3/UL (ref 0.1–0.9)
MONOCYTES NFR BLD AUTO: 9 %
NEUTROPHILS # BLD AUTO: 2.5 X10E3/UL (ref 1.4–7)
NEUTROPHILS NFR BLD AUTO: 46 %
PLATELET # BLD AUTO: 246 X10E3/UL (ref 150–450)
POTASSIUM SERPL-SCNC: 4.6 MMOL/L (ref 3.5–5.2)
PROT SERPL-MCNC: 6.5 G/DL (ref 6–8.5)
RBC # BLD AUTO: 4.27 X10E6/UL (ref 3.77–5.28)
SODIUM SERPL-SCNC: 145 MMOL/L (ref 134–144)
TRIGL SERPL-MCNC: 110 MG/DL (ref 0–149)
VLDLC SERPL CALC-MCNC: 19 MG/DL (ref 5–40)
WBC # BLD AUTO: 5.3 X10E3/UL (ref 3.4–10.8)

## 2022-10-18 ENCOUNTER — TELEPHONE (OUTPATIENT)
Dept: FAMILY MEDICINE CLINIC | Facility: CLINIC | Age: 62
End: 2022-10-18

## 2022-10-18 DIAGNOSIS — G43.009 MIGRAINE WITHOUT AURA AND WITHOUT STATUS MIGRAINOSUS, NOT INTRACTABLE: ICD-10-CM

## 2022-10-18 NOTE — TELEPHONE ENCOUNTER
Pt now scheduled for 11/22/23.     Requesting refill for SUMAtriptan-naproxen (TREXIMET)  MG per tablet    Send to [facility]

## 2022-10-18 NOTE — TELEPHONE ENCOUNTER
Pt has up coming apt with dr Archer  11/22/22 Would like refill on her  Migraine medication if possible

## 2022-10-19 RX ORDER — SUMATRIPTAN AND NAPROXEN SODIUM 85; 500 MG/1; MG/1
TABLET, FILM COATED ORAL
Qty: 9 TABLET | Refills: 0 | Status: SHIPPED | OUTPATIENT
Start: 2022-10-19 | End: 2023-03-29 | Stop reason: SDUPTHER

## 2023-03-29 ENCOUNTER — OFFICE VISIT (OUTPATIENT)
Dept: INTERNAL MEDICINE | Facility: CLINIC | Age: 63
End: 2023-03-29
Payer: COMMERCIAL

## 2023-03-29 VITALS
DIASTOLIC BLOOD PRESSURE: 70 MMHG | HEIGHT: 62 IN | SYSTOLIC BLOOD PRESSURE: 130 MMHG | HEART RATE: 117 BPM | WEIGHT: 158 LBS | BODY MASS INDEX: 29.08 KG/M2 | OXYGEN SATURATION: 98 %

## 2023-03-29 DIAGNOSIS — Z91.09 ENVIRONMENTAL ALLERGIES: ICD-10-CM

## 2023-03-29 DIAGNOSIS — G43.009 MIGRAINE WITHOUT AURA AND WITHOUT STATUS MIGRAINOSUS, NOT INTRACTABLE: ICD-10-CM

## 2023-03-29 DIAGNOSIS — Z12.11 SCREENING FOR COLON CANCER: Primary | ICD-10-CM

## 2023-03-29 PROBLEM — G43.909 HEADACHE, MIGRAINE: Status: ACTIVE | Noted: 2023-03-29

## 2023-03-29 PROBLEM — Z13.1 SCREENING FOR DIABETES MELLITUS: Status: RESOLVED | Noted: 2019-04-10 | Resolved: 2023-03-29

## 2023-03-29 PROBLEM — Z13.6 SCREENING FOR ISCHEMIC HEART DISEASE: Status: RESOLVED | Noted: 2019-04-10 | Resolved: 2023-03-29

## 2023-03-29 PROBLEM — J30.2 ALLERGIC RHINITIS, SEASONAL: Status: ACTIVE | Noted: 2023-03-29

## 2023-03-29 RX ORDER — SUMATRIPTAN AND NAPROXEN SODIUM 85; 500 MG/1; MG/1
TABLET, FILM COATED ORAL
Qty: 9 TABLET | Refills: 11 | Status: SHIPPED | OUTPATIENT
Start: 2023-03-29

## 2023-03-29 RX ORDER — FEXOFENADINE HCL AND PSEUDOEPHEDRINE HCI 180; 240 MG/1; MG/1
1 TABLET, EXTENDED RELEASE ORAL DAILY
Qty: 90 TABLET | Refills: 1 | Status: SHIPPED | OUTPATIENT
Start: 2023-03-29

## 2023-03-29 NOTE — PROGRESS NOTES
Subjective   Gaby Dobbins is a 62 y.o. female. Patient is here today for   Chief Complaint   Patient presents with   • Migraine     Medication refill          Vitals:    03/29/23 1434   BP: 130/70   Pulse: 117   SpO2: 98%     Body mass index is 28.9 kg/m².  The following portions of the patient's history were reviewed and updated as appropriate: allergies, current medications, past family history, past medical history, past social history, past surgical history and problem list.    Past Medical History:   Diagnosis Date   • Allergic    • Migraines       Allergies   Allergen Reactions   • Moxifloxacin       Social History     Socioeconomic History   • Marital status:    Tobacco Use   • Smoking status: Never   • Smokeless tobacco: Never   Vaping Use   • Vaping Use: Never used   Substance and Sexual Activity   • Alcohol use: Yes        Current Outpatient Medications:   •  fexofenadine-pseudoephedrine (ALLEGRA-D 24) 180-240 MG per 24 hr tablet, Take 1 tablet by mouth Daily., Disp: 90 tablet, Rfl: 1  •  Multiple Vitamin (MULTI VITAMIN) tablet, Take  by mouth., Disp: , Rfl:   •  SUMAtriptan-naproxen (TREXIMET)  MG per tablet, TAKE ONE TABLET BY MOUTH AT ONSET OF HEADACHE. MAY REPEAT AFTER TWO HOURS. MAX TWO DOSES DAILY, Disp: 9 tablet, Rfl: 11     Objective     History of Present Illness  Ms Dobbins is a 62 year old female new patient who is here to establish care. She previously saw Miko Edwards. She has migraine headaches and takes treximet as needed. It works fairly well for her but she would like to discuss alternate treatment  She has seasonal allergic rhinitis and takes allegra D .   She is scheduled for pap and mammogram tomorrow   The following data was reviewed by: KELLY Diaz on 03/29/2023:  Common labs    Common Labs 6/6/22 6/6/22 6/6/22    0840 0840 0840   Glucose  86    BUN  16    Creatinine  0.75    Sodium  145 (A)    Potassium  4.6    Chloride  106    Calcium  9.5    Total  Protein  6.5    Albumin  4.2    Total Bilirubin  0.3    Alkaline Phosphatase  87    AST (SGOT)  27    ALT (SGPT)  25    WBC 5.3     Hemoglobin 12.0     Hematocrit 37.3     Platelets 246     Total Cholesterol   224 (A)   Triglycerides   110   HDL Cholesterol   74   LDL Cholesterol    131 (A)   (A) Abnormal value            Data reviewed: previous pcp notes          Review of Systems   Constitutional: Negative for fatigue.   HENT: Positive for congestion.    Respiratory: Negative.    Cardiovascular: Negative.    Allergic/Immunologic: Positive for environmental allergies.   Neurological: Positive for headaches.       Physical Exam  Vitals and nursing note reviewed.   Constitutional:       General: She is not in acute distress.     Appearance: Normal appearance. She is well-developed and well-groomed.   HENT:      Head: Normocephalic.   Cardiovascular:      Rate and Rhythm: Regular rhythm. Tachycardia present.      Heart sounds: Normal heart sounds.   Pulmonary:      Effort: Pulmonary effort is normal.      Breath sounds: Normal breath sounds.   Skin:     General: Skin is warm.   Neurological:      Mental Status: She is alert.         ASSESSMENT     Problems Addressed this Visit     Environmental allergies    Relevant Medications    fexofenadine-pseudoephedrine (ALLEGRA-D 24) 180-240 MG per 24 hr tablet    Migraine without aura and without status migrainosus, not intractable    Relevant Medications    SUMAtriptan-naproxen (TREXIMET)  MG per tablet   Other Visit Diagnoses     Screening for colon cancer    -  Primary    Relevant Orders    Ambulatory Referral to General Surgery      Diagnoses       Codes Comments    Screening for colon cancer    -  Primary ICD-10-CM: Z12.11  ICD-9-CM: V76.51     Migraine without aura and without status migrainosus, not intractable     ICD-10-CM: G43.009  ICD-9-CM: 346.10     Environmental allergies     ICD-10-CM: Z91.09  ICD-9-CM: V15.09           PLAN  Pt is due for pap and  mammogram which are scheduled for tomorrow  Will refer for screening colonoscopy  She can continue to use treximet as needed for headache. Samples of nurtec and ublrevy given to try. She will let me know which one works best and will send in a prescription for her  For environmental allergies she can continue allegra D as needed.   Return in about 1 year (around 3/29/2024) for Annual physical, with labs.

## 2023-03-29 NOTE — PROGRESS NOTES
Subjective   Gaby Dobbins is a 62 y.o. female. Patient is here today for   Chief Complaint   Patient presents with   • Annual Exam     Medication refill          Vitals:    03/29/23 1434   BP: 130/70   Pulse: 117   SpO2: 98%     Body mass index is 28.9 kg/m².    The following portions of the patient's history were reviewed and updated as appropriate: allergies, current medications, past family history, past medical history, past social history, past surgical history and problem list.    Past Medical History:   Diagnosis Date   • Allergic    • Migraines       Allergies   Allergen Reactions   • Moxifloxacin       Social History     Socioeconomic History   • Marital status:    Tobacco Use   • Smoking status: Never   • Smokeless tobacco: Never   Vaping Use   • Vaping Use: Never used   Substance and Sexual Activity   • Alcohol use: Yes        Current Outpatient Medications:   •  Multiple Vitamin (MULTI VITAMIN) tablet, Take  by mouth., Disp: , Rfl:   •  SUMAtriptan-naproxen (TREXIMET)  MG per tablet, TAKE ONE TABLET BY MOUTH AT ONSET OF HEADACHE. MAY REPEAT AFTER TWO HOURS. MAX TWO DOSES DAILY, Disp: 9 tablet, Rfl: 0  •  fexofenadine-pseudoephedrine (ALLEGRA-D 24) 180-240 MG per 24 hr tablet, Take 1 tablet by mouth Daily., Disp: 90 tablet, Rfl: 1       Objective   History of Present Illness   Gaby Dobbins 62 y.o. female who presents for an Annual physical exam.   Labs results discussed in detail with the patient.  Plan to update vaccines if needed today.    Health Habits:  Dental Exam. {status:34473}  Eye Exam. {status:91548}  Exercise: {numbers; 0-10:48092} times/week.  Current exercise activities include: {misc; exercise types:22075}    Preventative counseling  Discussed face to face the importance of healthy diet and exercise.     Lab Results (most recent)     None            Review of Systems    Physical Exam    ASSESSMENT       Problems Addressed this Visit    None  Diagnoses    None.          PLAN    There are no Patient Instructions on file for this visit.    No follow-ups on file.  Patient was given instructions and counseling regarding her  condition for health maintenance advice.  Please see specific information pulled into the AVS if appropriate.

## 2023-05-01 ENCOUNTER — APPOINTMENT (OUTPATIENT)
Dept: WOMENS IMAGING | Facility: HOSPITAL | Age: 63
End: 2023-05-01
Payer: COMMERCIAL

## 2023-05-01 PROCEDURE — 77061 BREAST TOMOSYNTHESIS UNI: CPT | Performed by: RADIOLOGY

## 2023-05-01 PROCEDURE — 77065 DX MAMMO INCL CAD UNI: CPT | Performed by: RADIOLOGY

## 2023-05-01 PROCEDURE — G0279 TOMOSYNTHESIS, MAMMO: HCPCS | Performed by: RADIOLOGY

## 2023-05-10 ENCOUNTER — TELEPHONE (OUTPATIENT)
Dept: INTERNAL MEDICINE | Facility: CLINIC | Age: 63
End: 2023-05-10

## 2023-05-10 NOTE — TELEPHONE ENCOUNTER
"Caller: Gaby Dobbins \"Sabra\"    Relationship: Self    Best call back number: [phone]    What medication are you requesting: MELOXICAM     What are your current symptoms: LOW BACK ISSUE     How long have you been experiencing symptoms: MORE THAN 3 WEEKS     If a prescription is needed, what is your preferred pharmacy and phone number: [id number] - [place], [place] - [address] - [phone]  - [phone]      Additional notes: PATIENT STATES SHE HAS BEEN SEEING A SPORTS CHIROPRACTOR.     "

## 2023-05-10 NOTE — TELEPHONE ENCOUNTER
Called patient to schedule an appointment and she states she is going out of town and needed a prescription for Meloxicam before she left. She has been working with a Sports Chiropractor for 3 weeks and he suggested Meloxicam. She said at this time to disregard the request for Meloxicam because she will be leaving in the morning. She states she will continue to see the Sports Chiropractor when she gets back.

## 2023-05-15 ENCOUNTER — APPOINTMENT (OUTPATIENT)
Dept: WOMENS IMAGING | Facility: HOSPITAL | Age: 63
End: 2023-05-15
Payer: COMMERCIAL

## 2023-05-15 PROCEDURE — 19081 BX BREAST 1ST LESION STRTCTC: CPT | Performed by: RADIOLOGY

## 2023-05-15 PROCEDURE — A4648 IMPLANTABLE TISSUE MARKER: HCPCS | Performed by: RADIOLOGY

## 2023-05-17 ENCOUNTER — TELEPHONE (OUTPATIENT)
Dept: SURGERY | Facility: CLINIC | Age: 63
End: 2023-05-17
Payer: COMMERCIAL

## 2023-05-17 NOTE — TELEPHONE ENCOUNTER
New Patient appointment with Dr. Mojica:   5/31/23 at 9:00 am, . Patient is aware to arrive 15 minutes prior with paperwork filled out    Patient also aware if a breast MRI is ordered prior to consult, results will be given at time of consult.     Patient expressed v/u of appointment date and time.     New patient packet mailed. SD

## 2023-05-19 ENCOUNTER — HOSPITAL ENCOUNTER (OUTPATIENT)
Dept: MRI IMAGING | Facility: HOSPITAL | Age: 63
Discharge: HOME | End: 2023-05-19
Payer: COMMERCIAL

## 2023-05-19 DIAGNOSIS — D05.11 DUCTAL CARCINOMA IN SITU (DCIS) OF RIGHT BREAST: ICD-10-CM

## 2023-05-19 PROCEDURE — A9577 INJ MULTIHANCE: HCPCS | Performed by: SURGERY

## 2023-05-19 PROCEDURE — 77049 MRI BREAST C-+ W/CAD BI: CPT

## 2023-05-19 PROCEDURE — 0 GADOBENATE DIMEGLUMINE 529 MG/ML SOLUTION: Performed by: SURGERY

## 2023-05-19 PROCEDURE — 82565 ASSAY OF CREATININE: CPT

## 2023-05-19 RX ADMIN — GADOBENATE DIMEGLUMINE 14 ML: 529 INJECTION, SOLUTION INTRAVENOUS at 15:33

## 2023-05-20 LAB — CREAT BLDA-MCNC: 0.6 MG/DL (ref 0.6–1.3)

## 2023-05-22 ENCOUNTER — LAB REQUISITION (OUTPATIENT)
Dept: LAB | Facility: HOSPITAL | Age: 63
End: 2023-05-22
Payer: COMMERCIAL

## 2023-05-22 DIAGNOSIS — Z00.00 ENCOUNTER FOR GENERAL ADULT MEDICAL EXAMINATION WITHOUT ABNORMAL FINDINGS: ICD-10-CM

## 2023-05-22 PROCEDURE — 88342 IMHCHEM/IMCYTCHM 1ST ANTB: CPT | Performed by: SURGERY

## 2023-05-22 PROCEDURE — 88341 IMHCHEM/IMCYTCHM EA ADD ANTB: CPT | Performed by: SURGERY

## 2023-05-26 LAB
LAB AP CASE REPORT: NORMAL
LAB AP DIAGNOSIS COMMENT: NORMAL
PATH REPORT.FINAL DX SPEC: NORMAL
PATH REPORT.GROSS SPEC: NORMAL

## 2023-05-31 ENCOUNTER — HOSPITAL ENCOUNTER (OUTPATIENT)
Dept: SURGERY | Facility: HOSPITAL | Age: 63
Discharge: HOME | End: 2023-05-31

## 2023-05-31 ENCOUNTER — TRANSCRIBE ORDERS (OUTPATIENT)
Dept: SURGERY | Facility: CLINIC | Age: 63
End: 2023-05-31

## 2023-05-31 ENCOUNTER — TELEPHONE (OUTPATIENT)
Dept: SURGERY | Facility: CLINIC | Age: 63
End: 2023-05-31

## 2023-05-31 ENCOUNTER — OFFICE VISIT (OUTPATIENT)
Dept: SURGERY | Facility: CLINIC | Age: 63
End: 2023-05-31

## 2023-05-31 VITALS
SYSTOLIC BLOOD PRESSURE: 146 MMHG | BODY MASS INDEX: 29.26 KG/M2 | DIASTOLIC BLOOD PRESSURE: 88 MMHG | HEIGHT: 62 IN | RESPIRATION RATE: 17 BRPM | WEIGHT: 159 LBS | OXYGEN SATURATION: 98 % | HEART RATE: 91 BPM

## 2023-05-31 DIAGNOSIS — C50.911 DUCTAL CARCINOMA IN SITU (DCIS) OF RIGHT BREAST WITH MICROINVASIVE COMPONENT: Primary | ICD-10-CM

## 2023-05-31 DIAGNOSIS — D05.11 DUCTAL CARCINOMA IN SITU (DCIS) OF RIGHT BREAST: ICD-10-CM

## 2023-05-31 LAB
LAB AP CASE REPORT: NORMAL
LAB AP DIAGNOSIS COMMENT: NORMAL
PATH REPORT.ADDENDUM SPEC: NORMAL
PATH REPORT.FINAL DX SPEC: NORMAL
PATH REPORT.GROSS SPEC: NORMAL

## 2023-05-31 NOTE — PROGRESS NOTES
[facility]     Referring Provider: Graciela Angulo MD     Chief complaint: Newly diagnosed breast cancer    Subjective    HPI: Ms. Gaby Dobbins is a 64 yo woman, seen at the request of Dr. Graciela Angulo, for a new diagnosis of right breast cancer. This was initially detected as an imaging abnormality on routine screening. Her work-up is detailed in the oncologic history below. Prior to the biopsy, she denies any breast lumps, pain, skin changes, or nipple discharge. She denies any prior history of abnormal mammograms or breast biopsies. Her most recent mammogram prior to this year was in 2016. She denies any family history of breast or ovarian cancer. She was joined today in clinic by her .       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  There is a stable very small, oval mass measuring 3 mm seen in the sub areolar region of the left breast. No suspicious masses, suspicious microcalcifications or new areas of architectural distortion are identified. There has been no significant change from the prior exam(s).  In the right breast, no suspicious masses, significant calcifications or other abnormalities are seen.  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  There are calcifications with linear distribution seen in the posterior one-third central region of the right breast.  In the left breast, no suspicious masses, significant calcifications or other abnormalities are seen.  BI-RADS 0: Incomplete     5/1/2023 Imaging    Right Diagnostic MMG with [facility] ([facility])  On the present examination, there are multiple new indistinct  calcifications with linear distribution in the posterior one third of the right breast at 8 o'clock, central located 8 cm from the nipple. This spans approximately 2 cm. Additional imaging demonstrates microcalcifications are suspicious and tissue sampling is recommended.  BI-RADS 4B: Suspicious     5/15/2023 Biopsy    Right Breast, Stereotactic Biopsy ([facility]):    Right Breast, 8:00, stereotactic core biopsies   High grade solid ductal carcinoma in situ (DCIS) with rare foci of irregular ductal clusters,    Highly suspicious for invasive carcinoma.     Ductal carcinoma in situ (DCIS)    Nuclear grade: high    Architectural pattern(s): solid    Necrosis: not identified    Size: 3mm   Microcalcifications: present in DCIS    ER negative (0%)  ME negative (0%)  Ki-67 88.66%    -Top hat clip.  A 2 view postprocedure mammogram demonstrated the marker clip to have migrated approximately 2 cm lateral on the cc view from the expected biopsy site and a small hematoma.  Residual calcifications are present.    [facility] PATHOLOGY REVIEW    1. Right Breast, 8:00, Stereotactic-Guided Core Needle Biopsy:               A. MICROINVASIVE CARCINOMA, 0.9 MM , ARISING OUT OF A BACKGROUND OF HIGH GRADE                   DUCTAL CARCINOMA IN SITU (DCIS) WITH A BRISK LYMPHOCYTIC RESPONSE, Solid type with       single cell necrosis and calcifications.               B. Received immunostains performed at the outside institution shows the following from blocks A, B, and D with the following results (all controls reacted appropriately).                            P63:  Highlights an intact myoepithelial cell layer around the in-situ carcinoma with focal loss in 1D [facility]:  Highlights an intact myoepithelial cell layer around the in-situ carcinoma with focal loss in 1D               C. Repeat P63 immunostain as well as cytokeratin immunostain performed on unstained slides provided for block D shows the following (all controls reacted  appropriately):                            P63: Intact myoepithelial cell layer in the area of in situ carcinoma and loss in the area of                            microinvasive carcinoma                            AE1/AE3: Highlights the irregularity of malignant cell groups               D. See comment.    Comment:  We agree with the outside diagnosis rendered. Per outside report, hormone receptor studies were performed on block B to show the following:      ER:  Negative (0)  CO:  Negative (0)  Ki67:  88.66%     HER2 immunohistochemistry has been requested to be performed at the original institution on block D. Results to follow and be scanned separately into the patient's electronic medical record.      5/19/2023 Imaging    Bilateral Breast MRI ([facility]):  RIGHT BREAST:    At 7:00 in the posterior right breast, 7 cm posterior to the nipple, there is a 2.2 cm AP dimension, 3.4 cm transverse dimension, 1.0 cm craniocaudal dimension T2 hyperintense biopsy cavity with thin peripheral enhancement. There is a focus of susceptibility from a biopsy clip within the lateral aspect of the cavity, which was noted to be laterally displaced on the post biopsy mammogram. Additional susceptibility within the cavity likely reflects residual air from recent biopsy. No suspicious enhancement is identified at the biopsy site or elsewhere within the right breast.  No suspicious enhancement is identified in the right nipple or chest wall.   The visualized axilla is within normal limits.   LEFT BREAST:    No suspicious enhancing mass or area of non-mass enhancement is identified.  The visualized axilla is within normal limits.   EXTRAMAMMARY FINDINGS:   There are no pathologically enlarged internal mammary chain lymph nodes on either side.     BI-RADS 6: Known malignancy.         Review of Systems   Constitutional: Negative for appetite change, chills, diaphoresis, fatigue, fever and unexpected weight change.   HENT:   Negative for  hearing loss, lump/mass, mouth sores, nosebleeds, sore throat, tinnitus, trouble swallowing and voice change.    Eyes: Negative for eye problems and icterus.   Respiratory: Negative for chest tightness, cough, hemoptysis, shortness of breath and wheezing.    Cardiovascular: Negative for chest pain, leg swelling and palpitations.   Gastrointestinal: Negative for abdominal distention, abdominal pain, blood in stool, constipation, diarrhea, nausea, rectal pain and vomiting.   Endocrine: Negative for hot flashes.   Genitourinary: Negative for bladder incontinence, difficulty urinating, dyspareunia, dysuria, frequency, hematuria, menstrual problem, nocturia, pelvic pain, vaginal bleeding and vaginal discharge.    Musculoskeletal: Negative for arthralgias, back pain, flank pain, gait problem, myalgias, neck pain and neck stiffness.   Skin: Negative for itching, rash and wound.   Neurological: Negative for dizziness, extremity weakness, gait problem, headaches, light-headedness, numbness, seizures and speech difficulty.   Hematological: Negative for adenopathy. Does not bruise/bleed easily.   Psychiatric/Behavioral: Negative for confusion, decreased concentration, depression, sleep disturbance and suicidal ideas. The patient is not nervous/anxious.        Medications:    Current Outpatient Medications:   •  fexofenadine-pseudoephedrine (ALLEGRA-D 24) 180-240 MG per 24 hr tablet, Take 1 tablet by mouth Daily., Disp: 90 tablet, Rfl: 1  •  Multiple Vitamin (MULTI VITAMIN) tablet, Take  by mouth., Disp: , Rfl:   •  SUMAtriptan-naproxen (TREXIMET)  MG per tablet, TAKE ONE TABLET BY MOUTH AT ONSET OF HEADACHE. MAY REPEAT AFTER TWO HOURS. MAX TWO DOSES DAILY, Disp: 9 tablet, Rfl: 11      Allergies   Allergen Reactions   • Moxifloxacin        Past Medical History:   Diagnosis Date   • Allergic    • Migraines        Past Surgical History:   Procedure Laterality Date   • KIDNEY STONE SURGERY     • KNEE MENISCAL REPAIR          Family History   Problem Relation Age of Onset   • Hypertension Father        Social History     Socioeconomic History   • Marital status:    • Number of children: 2   Tobacco Use   • Smoking status: Never   • Smokeless tobacco: Never   Vaping Use   • Vaping Use: Never used   Substance and Sexual Activity   • Alcohol use: Yes   • Drug use: Never   • Sexual activity: Defer     Patient drinks 1-2 servings of caffeine per day.       GYNECOLOGIC HISTORY:   . P: 2. AB: 0.  Last menstrual period: Postmenopausal  Age at menarche: 12-13  Age at first childbirth: 32  Lactation/How lon weeks  Age at menopause: 52  Total years of oral contraceptive use: 25+  Total years of hormone replacement therapy: 0      Objective   PHYSICAL EXAMINATION:   Vitals:    23 0904   BP: 146/88   Pulse: 91   Resp: 17   SpO2: 98%     ECOG 0 - Asymptomatic  General: NAD, well appearing  Psych: a&o x 3, normal mood and affect  Eyes: EOMI, no scleral icterus  ENMT: neck supple without masses or thyromegaly, mucus membranes moist  Resp: normal effort, CTAB  CV: RRR, no murmurs, no edema  GI: soft, NT, ND  MSK: normal gait, normal ROM in bilateral shoulders  Lymph nodes: no cervical, supraclavicular or axillary lymphadenopathy  Breast: symmetric, moderate size, grade 3 ptosis  Right: No visible abnormalities on inspection while seated, with arms raised or hands on hips. No masses, skin changes, or nipple abnormalities.  Left: No visible abnormalities on inspection while seated, with arms raised or hands on hips. No masses, skin changes, or nipple abnormalities.    Right breast, in-office ultrasound: At 8:00, 6 cm FN, biopsy clip is visualized about 15 mm deep to the skin (this is known to have migrated 2 cm lateral to the biopsy site).     I have independently reviewed her imaging and here are my findings:   In the right lower outer, posterior breast, there is a 2 cm cluster of calcifications.  Postbiopsy mammogram  demonstrates 2 cm of lateral clip migration.  MRI shows a 3.4 cm biopsy cavity.      Assessment & Plan   Assessment:  63 y.o. F with a new diagnosis of right breast cancer: Microinvasive carcinoma (0.9 mm) arising in a background of high-grade ductal carcinoma in situ (DCIS), ER/SC negative, Her2 pending; clinical T1miN0, anatomic stage IA, prognostic stage IA.      Discussion:  I had an extensive discussion with the patient and her family about the nature of her breast cancer diagnosis. We reviewed the components of breast tissue including ducts and lobules. We reviewed her pathology report in detail. We reviewed breast cancer histology, including stage, grade, ER/SC receptors, HER2 receptors and how this applies to her diagnosis. We reviewed the basics of systemic and local/regional management of breast cancer.     I explained that in her case, systemic therapy will depend on the final size of the invasive component. She will be referred to medical oncology postoperatively to discuss this further. We reviewed potential surgical treatments to include partial mastectomy, mastectomy, sentinel lymph node biopsy and axillary node dissection and discussed the rationale associated with each approach. Regarding radiation therapy, we discussed that radiation is indicated in all cases of breast conservation and in only limited circumstances following mastectomy. We discussed that the primary goal of adjuvant radiation is to decrease the likelihood of local recurrence.     In her case, she is a good candidate for lumpectomy and she would like to proceed with breast conservation. We discussed that lumpectomy would require preoperative wire-localization. We also discussed the risk of positive margins and that she must have negative margins for lumpectomy to be an appropriate oncologic procedure. I will make every effort to obtain negative margins at her initial operation, but there is a 10-15% chance that she will require a  second operation for re-excision, or possibly a total mastectomy. We will not know the margin status until after her final pathology has returned.     We discussed that most breast cancer is not hereditary, however given her possible triple negative diagnosis, this may play a role in her case. Genetic testing is warranted and was sent today. This could affect surgical decision making. Should the results show a pathologic mutation, I would recommend a mastectomy on the cancer side and a contralateral risk-reduction mastectomy. She understands that this would not be for the treatment of her right breast cancer and will not improve her prognosis long term. This would be to reduce her risk of a second, primary breast cancer. If she is negative for a mutation, then I would not recommend a bilateral prophylactic mastectomy, since her risk of a second primary cancer would be relatively low.    We discussed axillary staging. I described the procedure for sentinel lymph node biopsy in detail, including the preoperative injection of a radiotracer and intraoperative injection of lymphazurin blue dye. I explained that this is a mapping test and not a cancer test, that all of the lymph nodes containing these dyes will be removed for complete testing by pathology, and that the results could impact the decision for adjuvant treatment or additional surgery.    I described additional risks and potential complications associated with surgery, including, but not limited to, bleeding, infection, complications related to blue dye, lymphedema, deformity/poor cosmetic result, chronic pain, neurovascular injury, numbness, seroma, hematoma, deep venous thrombosis, skin flap necrosis, disease recurrence and the possibility of requiring additional surgery. We also discussed other treatment options including the option of not undergoing any surgical treatment and the risks associated with this including disease progression. She expressed an  understanding of these factors and wished to proceed.    Plan:  A multidisciplinary plan has been formulated for the patient:      (1) Breast Surgical Oncology:  -Follow-up HER2. I will call her with results.  -F/u genetic testing. I will call her with results.  -Preoperative right diagnostic mammogram for surgical planning.  -Right needle-localized, bracketed, partial mastectomy and sentinel lymph node biopsy.    (2) Medical Oncology:  -Will refer postoperatively.    (3) Radiation Oncology:  -Will refer postoperatively.    Kristin Mojica MD    I spent 100 minutes caring for Sabra on this date of service. This time includes time spent by me in the following activities: preparing for the visit, performing a medically appropriate examination and/or evaluation , counseling and educating the patient/family/caregiver, ordering medications, tests, or procedures, referring and communicating with other health care professionals (I discussed her case with Dr. Velazquez today), documenting information in the medical record and independently interpreting results and communicating that information with the patient/family/caregiver.      CC:  MD Kira Squires APRN Mollie Cartwright, MD

## 2023-05-31 NOTE — TELEPHONE ENCOUNTER
I called patient and let her know the following.     ----- Message from Kristin Mojica MD sent at 5/31/2023  3:05 PM EDT -----  Please call and let her know that the HER2 was negative.  Thanks

## 2023-05-31 NOTE — LETTER
May 31, 2023       No Recipients    Patient: Gaby Dobbins   YOB: 1960   Date of Visit: 5/31/2023       Dear Dr. Puri Recipients:    Thank you for referring Gaby Dobbins to me for evaluation. Below are the relevant portions of my assessment and plan of care.    If you have questions, please do not hesitate to call me. I look forward to following Gaby along with you.         Sincerely,        Kristin Mojica MD        CC:   No Recipients    Kristin Mojica MD  05/31/23 1016  Signed  [facility]     Referring Provider: Graciela Angulo MD     Chief complaint: Newly diagnosed breast cancer    Subjective     HPI: Ms. Gaby Dobbins is a 64 yo woman, seen at the request of Dr. Graciela Angulo, for a new diagnosis of right breast cancer. This was initially detected as an imaging abnormality on routine screening. Her work-up is detailed in the oncologic history below. Prior to the biopsy, she denies any breast lumps, pain, skin changes, or nipple discharge. She denies any prior history of abnormal mammograms or breast biopsies. Her most recent mammogram prior to this year was in 2016. She denies any family history of breast or ovarian cancer. She was joined today in clinic by her .       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  There is a stable very small, oval mass measuring 3 mm seen in the sub areolar region of the left breast. No suspicious masses, suspicious microcalcifications or new areas of architectural distortion are identified. There has been no significant change from the prior exam(s).  In the right breast, no suspicious masses, significant calcifications or other abnormalities are seen.  BI-RADS 2: Benign     3/29/2023 Initial Diagnosis    Ductal carcinoma in situ (DCIS) of right  breast with microinvasive component     3/30/2023 Imaging    Screening MMG with [facility] ([facility])  There are scattered areas of fibroglandular density.  There are calcifications with linear distribution seen in the posterior one-third central region of the right breast.  In the left breast, no suspicious masses, significant calcifications or other abnormalities are seen.  BI-RADS 0: Incomplete     5/1/2023 Imaging    Right Diagnostic MMG with [facility] ([facility])  On the present examination, there are multiple new indistinct calcifications with linear distribution in the posterior one third of the right breast at 8 o'clock, central located 8 cm from the nipple. This spans approximately 2 cm. Additional imaging demonstrates microcalcifications are suspicious and tissue sampling is recommended.  BI-RADS 4B: Suspicious     5/15/2023 Biopsy    Right Breast, Stereotactic Biopsy ([facility]):    Right Breast, 8:00, stereotactic core biopsies   High grade solid ductal carcinoma in situ (DCIS) with rare foci of irregular ductal clusters,    Highly suspicious for invasive carcinoma.     Ductal carcinoma in situ (DCIS)    Nuclear grade: high    Architectural pattern(s): solid    Necrosis: not identified    Size: 3mm   Microcalcifications: present in DCIS    ER negative (0%)  NH negative (0%)  Ki-67 88.66%    -Top hat clip.  A 2 view postprocedure mammogram demonstrated the marker clip to have migrated approximately 2 cm lateral on the cc view from the expected biopsy site and a small hematoma.  Residual calcifications are present.    [facility] PATHOLOGY REVIEW    1. Right Breast, 8:00, Stereotactic-Guided Core Needle Biopsy:               A. MICROINVASIVE CARCINOMA, 0.9 MM , ARISING OUT OF A BACKGROUND OF HIGH GRADE                   DUCTAL CARCINOMA IN SITU (DCIS) WITH A BRISK LYMPHOCYTIC RESPONSE, Solid type with       single cell necrosis and calcifications.               B. Received immunostains performed at the outside  institution shows the following from blocks A, B, and D with the following results (all controls reacted appropriately).                            P63:  Highlights an intact myoepithelial cell layer around the in-situ carcinoma with focal loss in 1D [facility]:  Highlights an intact myoepithelial cell layer around the in-situ carcinoma with focal loss in 1D               C. Repeat P63 immunostain as well as cytokeratin immunostain performed on unstained slides provided for block D shows the following (all controls reacted appropriately):                            P63: Intact myoepithelial cell layer in the area of in situ carcinoma and loss in the area of                            microinvasive carcinoma                            AE1/AE3: Highlights the irregularity of malignant cell groups               D. See comment.    Comment:  We agree with the outside diagnosis rendered. Per outside report, hormone receptor studies were performed on block B to show the following:      ER:  Negative (0)  CA:  Negative (0)  Ki67:  88.66%     HER2 immunohistochemistry has been requested to be performed at the original institution on block D. Results to follow and be scanned separately into the patient's electronic medical record.      5/19/2023 Imaging    Bilateral Breast MRI ([facility]):  RIGHT BREAST:    At 7:00 in the posterior right breast, 7 cm posterior to the nipple, there is a 2.2 cm AP dimension, 3.4 cm transverse dimension, 1.0 cm craniocaudal dimension T2 hyperintense biopsy cavity with thin peripheral enhancement. There is a focus of susceptibility from a biopsy clip within the lateral aspect of the cavity, which was noted to be laterally displaced on the post biopsy mammogram. Additional susceptibility within the cavity likely reflects residual air from recent biopsy. No suspicious enhancement is identified at the biopsy site or elsewhere within the right breast.  No suspicious enhancement is identified in the right  nipple or chest wall.   The visualized axilla is within normal limits.   LEFT BREAST:    No suspicious enhancing mass or area of non-mass enhancement is identified.  The visualized axilla is within normal limits.   EXTRAMAMMARY FINDINGS:   There are no pathologically enlarged internal mammary chain lymph nodes on either side.     BI-RADS 6: Known malignancy.         Review of Systems   Constitutional: Negative for appetite change, chills, diaphoresis, fatigue, fever and unexpected weight change.   HENT:   Negative for hearing loss, lump/mass, mouth sores, nosebleeds, sore throat, tinnitus, trouble swallowing and voice change.    Eyes: Negative for eye problems and icterus.   Respiratory: Negative for chest tightness, cough, hemoptysis, shortness of breath and wheezing.    Cardiovascular: Negative for chest pain, leg swelling and palpitations.   Gastrointestinal: Negative for abdominal distention, abdominal pain, blood in stool, constipation, diarrhea, nausea, rectal pain and vomiting.   Endocrine: Negative for hot flashes.   Genitourinary: Negative for bladder incontinence, difficulty urinating, dyspareunia, dysuria, frequency, hematuria, menstrual problem, nocturia, pelvic pain, vaginal bleeding and vaginal discharge.    Musculoskeletal: Negative for arthralgias, back pain, flank pain, gait problem, myalgias, neck pain and neck stiffness.   Skin: Negative for itching, rash and wound.   Neurological: Negative for dizziness, extremity weakness, gait problem, headaches, light-headedness, numbness, seizures and speech difficulty.   Hematological: Negative for adenopathy. Does not bruise/bleed easily.   Psychiatric/Behavioral: Negative for confusion, decreased concentration, depression, sleep disturbance and suicidal ideas. The patient is not nervous/anxious.        Medications:    Current Outpatient Medications:   •  fexofenadine-pseudoephedrine (ALLEGRA-D 24) 180-240 MG per 24 hr tablet, Take 1 tablet by mouth Daily.,  Disp: 90 tablet, Rfl: 1  •  Multiple Vitamin (MULTI VITAMIN) tablet, Take  by mouth., Disp: , Rfl:   •  SUMAtriptan-naproxen (TREXIMET)  MG per tablet, TAKE ONE TABLET BY MOUTH AT ONSET OF HEADACHE. MAY REPEAT AFTER TWO HOURS. MAX TWO DOSES DAILY, Disp: 9 tablet, Rfl: 11      Allergies   Allergen Reactions   • Moxifloxacin        Past Medical History:   Diagnosis Date   • Allergic    • Migraines        Past Surgical History:   Procedure Laterality Date   • KIDNEY STONE SURGERY     • KNEE MENISCAL REPAIR         Family History   Problem Relation Age of Onset   • Hypertension Father        Social History     Socioeconomic History   • Marital status:    • Number of children: 2   Tobacco Use   • Smoking status: Never   • Smokeless tobacco: Never   Vaping Use   • Vaping Use: Never used   Substance and Sexual Activity   • Alcohol use: Yes   • Drug use: Never   • Sexual activity: Defer     Patient drinks 1-2 servings of caffeine per day.       GYNECOLOGIC HISTORY:   . P: 2. AB: 0.  Last menstrual period: Postmenopausal  Age at menarche: 12-13  Age at first childbirth: 32  Lactation/How lon weeks  Age at menopause: 52  Total years of oral contraceptive use: 25+  Total years of hormone replacement therapy: 0      Objective    PHYSICAL EXAMINATION:   Vitals:    23 0904   BP: 146/88   Pulse: 91   Resp: 17   SpO2: 98%     ECOG 0 - Asymptomatic  General: NAD, well appearing  Psych: a&o x 3, normal mood and affect  Eyes: EOMI, no scleral icterus  ENMT: neck supple without masses or thyromegaly, mucus membranes moist  Resp: normal effort, CTAB  CV: RRR, no murmurs, no edema  GI: soft, NT, ND  MSK: normal gait, normal ROM in bilateral shoulders  Lymph nodes: no cervical, supraclavicular or axillary lymphadenopathy  Breast: symmetric, moderate size, grade 3 ptosis  Right: No visible abnormalities on inspection while seated, with arms raised or hands on hips. No masses, skin changes, or nipple  abnormalities.  Left: No visible abnormalities on inspection while seated, with arms raised or hands on hips. No masses, skin changes, or nipple abnormalities.    Right breast, in-office ultrasound: At 8:00, 6 cm FN, biopsy clip is visualized about 15 mm deep to the skin (this is known to have migrated 2 cm lateral to the biopsy site).     I have independently reviewed her imaging and here are my findings:   In the right lower outer, posterior breast, there is a 2 cm cluster of calcifications.  Postbiopsy mammogram demonstrates 2 cm of lateral clip migration.  MRI shows a 3.4 cm biopsy cavity.      Assessment & Plan   Assessment:  63 y.o. F with a new diagnosis of right breast cancer: Microinvasive carcinoma (0.9 mm) arising in a background of high-grade ductal carcinoma in situ (DCIS), ER/WA negative, Her2 pending; clinical T1miN0, anatomic stage IA, prognostic stage IA.      Discussion:  I had an extensive discussion with the patient and her family about the nature of her breast cancer diagnosis. We reviewed the components of breast tissue including ducts and lobules. We reviewed her pathology report in detail. We reviewed breast cancer histology, including stage, grade, ER/WA receptors, HER2 receptors and how this applies to her diagnosis. We reviewed the basics of systemic and local/regional management of breast cancer.     I explained that in her case, systemic therapy will depend on the final size of the invasive component. She will be referred to medical oncology postoperatively to discuss this further. We reviewed potential surgical treatments to include partial mastectomy, mastectomy, sentinel lymph node biopsy and axillary node dissection and discussed the rationale associated with each approach. Regarding radiation therapy, we discussed that radiation is indicated in all cases of breast conservation and in only limited circumstances following mastectomy. We discussed that the primary goal of adjuvant  radiation is to decrease the likelihood of local recurrence.     In her case, she is a good candidate for lumpectomy and she would like to proceed with breast conservation. We discussed that lumpectomy would require preoperative wire-localization. We also discussed the risk of positive margins and that she must have negative margins for lumpectomy to be an appropriate oncologic procedure. I will make every effort to obtain negative margins at her initial operation, but there is a 10-15% chance that she will require a second operation for re-excision, or possibly a total mastectomy. We will not know the margin status until after her final pathology has returned.     We discussed that most breast cancer is not hereditary, however given her possible triple negative diagnosis, this may play a role in her case. Genetic testing is warranted and was sent today. This could affect surgical decision making. Should the results show a pathologic mutation, I would recommend a mastectomy on the cancer side and a contralateral risk-reduction mastectomy. She understands that this would not be for the treatment of her right breast cancer and will not improve her prognosis long term. This would be to reduce her risk of a second, primary breast cancer. If she is negative for a mutation, then I would not recommend a bilateral prophylactic mastectomy, since her risk of a second primary cancer would be relatively low.    We discussed axillary staging. I described the procedure for sentinel lymph node biopsy in detail, including the preoperative injection of a radiotracer and intraoperative injection of lymphazurin blue dye. I explained that this is a mapping test and not a cancer test, that all of the lymph nodes containing these dyes will be removed for complete testing by pathology, and that the results could impact the decision for adjuvant treatment or additional surgery.    I described additional risks and potential complications  associated with surgery, including, but not limited to, bleeding, infection, complications related to blue dye, lymphedema, deformity/poor cosmetic result, chronic pain, neurovascular injury, numbness, seroma, hematoma, deep venous thrombosis, skin flap necrosis, disease recurrence and the possibility of requiring additional surgery. We also discussed other treatment options including the option of not undergoing any surgical treatment and the risks associated with this including disease progression. She expressed an understanding of these factors and wished to proceed.    Plan:  A multidisciplinary plan has been formulated for the patient:      (1) Breast Surgical Oncology:  -Follow-up HER2. I will call her with results.  -F/u genetic testing. I will call her with results.  -Preoperative right diagnostic mammogram for surgical planning.  -Right needle-localized, bracketed, partial mastectomy and sentinel lymph node biopsy.    (2) Medical Oncology:  -Will refer postoperatively.    (3) Radiation Oncology:  -Will refer postoperatively.    Kristin Mojica MD    I spent 100 minutes caring for Sabra on this date of service. This time includes time spent by me in the following activities: preparing for the visit, performing a medically appropriate examination and/or evaluation , counseling and educating the patient/family/caregiver, ordering medications, tests, or procedures, referring and communicating with other health care professionals (I discussed her case with Dr. Velazquez today), documenting information in the medical record and independently interpreting results and communicating that information with the patient/family/caregiver.      CC:  MD Kira Squires APRN Mollie Cartwright, MD

## 2023-06-01 ENCOUNTER — HOSPITAL ENCOUNTER (OUTPATIENT)
Dept: MAMMOGRAPHY | Facility: HOSPITAL | Age: 63
Discharge: HOME | End: 2023-06-01
Admitting: SURGERY

## 2023-06-01 DIAGNOSIS — C50.911 DUCTAL CARCINOMA IN SITU (DCIS) OF RIGHT BREAST WITH MICROINVASIVE COMPONENT: ICD-10-CM

## 2023-06-01 PROCEDURE — 77065 DX MAMMO INCL CAD UNI: CPT

## 2023-06-01 PROCEDURE — G0279 TOMOSYNTHESIS, MAMMO: HCPCS

## 2023-06-02 ENCOUNTER — PATIENT OUTREACH (OUTPATIENT)
Dept: OTHER | Facility: HOSPITAL | Age: 63
End: 2023-06-02

## 2023-06-02 NOTE — PROGRESS NOTES
Referral received from Dr. Mojica's office. I called Ms. Dobbins and introduced myself and navigational services. She stated the consult with Dr. Mojica went well and she is leaning toward a lumpectomy. She has a good understanding of her pathology and treatment options. She was able to verbaolize teach back on her plan of care.      She stated she has a wonderful support system with her , daughters, and friends. She stated she feels comfortable talking to them about needs or issues.      She stated she has no financial or transportation concerns at this time. She has no resource needs or ongoing concerns at this time.      She stated she has been anxious about her diagnosis and we discussed that can be normal. We discussed we have support options if the need arises. She was thankful for the information.      We discussed integrative therapies and other services [facility]. She will received a navigation folder with the following information in the mail:     [facility], Cancer and Restorative Exercise (CARE), [employer] Exercise program, Guide for the Newly Diagnosed, [employer], [facility], Massage Therapy, Reiki Therapy, [employer] [facility], Cancer Nutrition, and Survivorship Clinic.     She verbalized appreciation for navigational services and she has my contact information and will call with any questions that arise.

## 2023-06-06 ENCOUNTER — TELEPHONE (OUTPATIENT)
Dept: SURGERY | Facility: CLINIC | Age: 63
End: 2023-06-06
Payer: COMMERCIAL

## 2023-06-06 NOTE — TELEPHONE ENCOUNTER
I called 30 gram tube 2.5% EMLA Cream to the patients following pharmacy    Directions are to apply typically to the RIGHT areola and the skin just beyond it on the morning of surgery.     I also called and let patient know that I have called in the following prescription to the preferred pharmacy.    [facility] #[id number] - [place], [place] - [address] - [phone]  - [phone] [[id number]].

## 2023-06-09 ENCOUNTER — TELEPHONE (OUTPATIENT)
Dept: SURGERY | Facility: CLINIC | Age: 63
End: 2023-06-09
Payer: COMMERCIAL

## 2023-06-09 NOTE — TELEPHONE ENCOUNTER
I called patient and let her know that her genetic testing was negative for a mutation.      Dr. Mojica will give her a copy of the results at her postoperative appointment.

## 2023-06-12 ENCOUNTER — PATIENT OUTREACH (OUTPATIENT)
Dept: OTHER | Facility: HOSPITAL | Age: 63
End: 2023-06-12
Payer: COMMERCIAL

## 2023-06-12 ENCOUNTER — PRE-ADMISSION TESTING (OUTPATIENT)
Dept: PREADMISSION TESTING | Facility: HOSPITAL | Age: 63
End: 2023-06-12
Payer: COMMERCIAL

## 2023-06-12 VITALS
TEMPERATURE: 97.9 F | HEIGHT: 62 IN | BODY MASS INDEX: 29.19 KG/M2 | DIASTOLIC BLOOD PRESSURE: 72 MMHG | WEIGHT: 158.6 LBS | HEART RATE: 89 BPM | OXYGEN SATURATION: 96 % | SYSTOLIC BLOOD PRESSURE: 121 MMHG | RESPIRATION RATE: 16 BRPM

## 2023-06-12 DIAGNOSIS — C50.911 MALIGNANT NEOPLASM OF RIGHT FEMALE BREAST, UNSPECIFIED ESTROGEN RECEPTOR STATUS, UNSPECIFIED SITE OF BREAST: Primary | ICD-10-CM

## 2023-06-12 DIAGNOSIS — C50.911 DUCTAL CARCINOMA IN SITU (DCIS) OF RIGHT BREAST WITH MICROINVASIVE COMPONENT: ICD-10-CM

## 2023-06-12 LAB
ANION GAP SERPL CALCULATED.3IONS-SCNC: 10 MMOL/L (ref 5–15)
BASOPHILS # BLD AUTO: 0.03 10*3/MM3 (ref 0–0.2)
BASOPHILS NFR BLD AUTO: 0.6 % (ref 0–1.5)
BUN SERPL-MCNC: 16 MG/DL (ref 8–23)
BUN/CREAT SERPL: 21.3 (ref 7–25)
CALCIUM SPEC-SCNC: 9.2 MG/DL (ref 8.6–10.5)
CHLORIDE SERPL-SCNC: 106 MMOL/L (ref 98–107)
CO2 SERPL-SCNC: 24 MMOL/L (ref 22–29)
CREAT SERPL-MCNC: 0.75 MG/DL (ref 0.57–1)
DEPRECATED RDW RBC AUTO: 40.6 FL (ref 37–54)
EGFRCR SERPLBLD CKD-EPI 2021: 90.7 ML/MIN/1.73
EOSINOPHIL # BLD AUTO: 0.41 10*3/MM3 (ref 0–0.4)
EOSINOPHIL NFR BLD AUTO: 8 % (ref 0.3–6.2)
ERYTHROCYTE [DISTWIDTH] IN BLOOD BY AUTOMATED COUNT: 13 % (ref 12.3–15.4)
GLUCOSE SERPL-MCNC: 88 MG/DL (ref 65–99)
HCT VFR BLD AUTO: 39.8 % (ref 34–46.6)
HGB BLD-MCNC: 13.4 G/DL (ref 12–15.9)
IMM GRANULOCYTES # BLD AUTO: 0.01 10*3/MM3 (ref 0–0.05)
IMM GRANULOCYTES NFR BLD AUTO: 0.2 % (ref 0–0.5)
LYMPHOCYTES # BLD AUTO: 1.6 10*3/MM3 (ref 0.7–3.1)
LYMPHOCYTES NFR BLD AUTO: 31.3 % (ref 19.6–45.3)
MCH RBC QN AUTO: 29 PG (ref 26.6–33)
MCHC RBC AUTO-ENTMCNC: 33.7 G/DL (ref 31.5–35.7)
MCV RBC AUTO: 86.1 FL (ref 79–97)
MONOCYTES # BLD AUTO: 0.54 10*3/MM3 (ref 0.1–0.9)
MONOCYTES NFR BLD AUTO: 10.5 % (ref 5–12)
NEUTROPHILS NFR BLD AUTO: 2.53 10*3/MM3 (ref 1.7–7)
NEUTROPHILS NFR BLD AUTO: 49.4 % (ref 42.7–76)
NRBC BLD AUTO-RTO: 0 /100 WBC (ref 0–0.2)
PLATELET # BLD AUTO: 214 10*3/MM3 (ref 140–450)
PMV BLD AUTO: 10.9 FL (ref 6–12)
POTASSIUM SERPL-SCNC: 4.1 MMOL/L (ref 3.5–5.2)
QT INTERVAL: 385 MS
RBC # BLD AUTO: 4.62 10*6/MM3 (ref 3.77–5.28)
SODIUM SERPL-SCNC: 140 MMOL/L (ref 136–145)
WBC NRBC COR # BLD: 5.12 10*3/MM3 (ref 3.4–10.8)

## 2023-06-12 PROCEDURE — 93010 ELECTROCARDIOGRAM REPORT: CPT | Performed by: INTERNAL MEDICINE

## 2023-06-12 PROCEDURE — 93005 ELECTROCARDIOGRAM TRACING: CPT

## 2023-06-12 PROCEDURE — 85025 COMPLETE CBC W/AUTO DIFF WBC: CPT

## 2023-06-12 PROCEDURE — 36415 COLL VENOUS BLD VENIPUNCTURE: CPT

## 2023-06-12 PROCEDURE — 80048 BASIC METABOLIC PNL TOTAL CA: CPT

## 2023-06-12 RX ORDER — LIDOCAINE AND PRILOCAINE 25; 25 MG/G; MG/G
CREAM TOPICAL TAKE AS DIRECTED
COMMUNITY
Start: 2023-06-06 | End: 2023-06-19 | Stop reason: HOSPADM

## 2023-06-12 NOTE — PROGRESS NOTES
Ms. Dobbins sent an email with questions about diet, lifestyle, and lymphedema. Responded to questions and asked her to call with any further questions

## 2023-06-12 NOTE — DISCHARGE INSTRUCTIONS
Take the following medications the morning of surgery:  NONE    CLARIFY WITH SURGEON ON TAKING TREXIMET BETWEEN NOW AND SURGERY IF NEEDED    ARRIVE AT 5:30 AM TO ENTRANCE C SURGERY DAY.      If you are on prescription narcotic pain medication to control your pain you may also take that medication the morning of surgery.    General Instructions:  Do not eat solid food after midnight the night before surgery.  You may drink clear liquids day of surgery but must stop at least one hour before your hospital arrival time.  It is beneficial for you to have a clear drink that contains carbohydrates the day of surgery.  We suggest a 12 to 20 ounce bottle of Gatorade or Powerade for non-diabetic patients or a 12 to 20 ounce bottle of G2 or Powerade Zero for diabetic patients. (Pediatric patients, are not advised to drink a 12 to 20 ounce carbohydrate drink)    Clear liquids are liquids you can see through.  Nothing red in color.     Plain water                               Sports drinks  Sodas                                   Gelatin (Jell-O)  Fruit juices without pulp such as white grape juice and apple juice  Popsicles that contain no fruit or yogurt  Tea or coffee (no cream or milk added)  Gatorade / Powerade  G2 / Powerade Zero    Infants may have breast milk up to four hours before surgery.  Infants drinking formula may drink formula up to six hours before surgery.   Patients who avoid smoking, chewing tobacco and alcohol for 4 weeks prior to surgery have a reduced risk of post-operative complications.  Quit smoking as many days before surgery as you can.  Do not smoke, use chewing tobacco or drink alcohol the day of surgery.   If applicable bring your C-PAP/ BI-PAP machine in with you to preop day of surgery.  Bring any papers given to you in the doctor’s office.  Wear clean comfortable clothes.  Do not wear contact lenses, false eyelashes or make-up.  Bring a case for your glasses.   Bring crutches or walker if  applicable.  Remove all piercings.  Leave jewelry and any other valuables at home.  Hair extensions with metal clips must be removed prior to surgery.  The Pre-Admission Testing nurse will instruct you to bring medications if unable to obtain an accurate list in Pre-Admission Testing.        If you were given a blood bank ID arm band remember to bring it with you the day of surgery.    Preventing a Surgical Site Infection:  For 2 to 3 days before surgery, avoid shaving with a razor because the razor can irritate skin and make it easier to develop an infection.    Any areas of open skin can increase the risk of a post-operative wound infection by allowing bacteria to enter and travel throughout the body.  Notify your surgeon if you have any skin wounds / rashes even if it is not near the expected surgical site.  The area will need assessed to determine if surgery should be delayed until it is healed.  The night prior to surgery shower using a fresh bar of anti-bacterial soap (such as Dial) and clean washcloth.  Sleep in a clean bed with clean clothing.  Do not allow pets to sleep with you.  Shower on the morning of surgery using a fresh bar of anti-bacterial soap (such as Dial) and clean washcloth.  Dry with a clean towel and dress in clean clothing.  Ask your surgeon if you will be receiving antibiotics prior to surgery.  Make sure you, your family, and all healthcare providers clean their hands with soap and water or an alcohol based hand  before caring for you or your wound.    Day of surgery:  Your arrival time is approximately two hours before your scheduled surgery time.  Upon arrival, a Pre-op nurse and Anesthesiologist will review your health history, obtain vital signs, and answer questions you may have.  The only belongings needed at this time will be a list of your home medications and if applicable your C-PAP/BI-PAP machine.  A Pre-op nurse will start an IV and you may receive medication in  preparation for surgery, including something to help you relax.     Please be aware that surgery does come with discomfort.  We want to make every effort to control your discomfort so please discuss any uncontrolled symptoms with your nurse.   Your doctor will most likely have prescribed pain medications.      If you are going home after surgery you will receive individualized written care instructions before being discharged.  A responsible adult must drive you to and from the hospital on the day of your surgery and stay with you for 24 hours.  Discharge prescriptions can be filled by the hospital pharmacy during regular pharmacy hours.  If you are having surgery late in the day/evening your prescription may be e-prescribed to your pharmacy.  Please verify your pharmacy hours or chose a 24 hour pharmacy to avoid not having access to your prescription because your pharmacy has closed for the day.    If you are staying overnight following surgery, you will be transported to your hospital room following the recovery period.  [facility] has all private rooms.    If you have any questions please call Pre-Admission Testing at [phone].  Deductibles and co-payments are collected on the day of service. Please be prepared to pay the required co-pay, deductible or deposit on the day of service as defined by your plan.    Call your surgeon immediately if you experience any of the following symptoms:  Sore Throat  Shortness of Breath or difficulty breathing  Cough  Chills  Body soreness or muscle pain  Headache  Fever  New loss of taste or smell  Do not arrive for your surgery ill.  Your procedure will need to be rescheduled to another time.  You will need to call your physician before the day of surgery to avoid any unnecessary exposure to hospital staff as well as other patients.

## 2023-06-13 ENCOUNTER — TELEMEDICINE - AUDIO (OUTPATIENT)
Dept: OTHER | Facility: HOSPITAL | Age: 63
End: 2023-06-13
Payer: COMMERCIAL

## 2023-06-13 NOTE — PROGRESS NOTES
"OUTPATIENT ONCOLOGY NUTRITION ASSESSMENT    Patient Name: Gaby Dobbins  YOB: 1960  MRN: [id number]  Assessment Date: 6/13/2023    COMMENTS: Spoke to patient on the phone yesterday and answered questions about nutrition and breast cancer. Mailing patient a packet of handouts per her request. Will be available for any questions and follow for plan of care.          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    • Migraines        Past Surgical History:   Procedure Laterality Date   • BLEPHAROPLASTY Left     LOWER   • KIDNEY STONE SURGERY  2000   • KNEE MENISCAL REPAIR            Anthropometrics        Current Height Ht Readings from Last 1 Encounters:   06/12/23 157.5 cm (62\")      Current Weight Wt Readings from Last 1 Encounters:   06/12/23 71.9 kg (158 lb 9.6 oz)      BMI  28   Ideal Body Weight (IBW) 110 lb   Usual Body Weight (UBW) 158 lb   Weight Change/Trend Stable   Weight History Wt Readings from Last 30 Encounters:   06/12/23 0910 71.9 kg (158 lb 9.6 oz)   05/31/23 0904 72.1 kg (159 lb)   03/29/23 1434 71.7 kg (158 lb)   05/28/22 0950 62.6 kg (138 lb)   05/03/22 1304 63.5 kg (140 lb)   07/08/20 1451 67.5 kg (148 lb 12.8 oz)   04/10/19 0909 67.6 kg (149 lb)   04/20/17 1032 66.9 kg (147 lb 6.4 oz)   02/22/17 1011 66.7 kg (147 lb)   12/09/16 1356 67.8 kg (149 lb 8 oz)   08/26/15 0827 69.5 kg (153 lb 3.9 oz)   11/13/14 1024 72.6 kg (160 lb 0.9 oz)   05/31/13 1146 69.6 kg (153 lb 7 oz)          Medications           Current medications: Magnesium, SUMAtriptan-naproxen, " fexofenadine-pseudoephedrine, lidocaine-prilocaine, and multivitamin                 Tests/Procedures        Tests/Procedures No new tests/procedures     Labs       Pertinent Labs    Results from last 7 days   Lab Units 06/12/23  0853   SODIUM mmol/L 140   POTASSIUM mmol/L 4.1   CHLORIDE mmol/L 106   CO2 mmol/L 24.0   BUN mg/dL 16   CREATININE mg/dL 0.75   CALCIUM mg/dL 9.2   GLUCOSE mg/dL 88     Results from last 7 days   Lab Units 06/12/23  0853   HEMOGLOBIN g/dL 13.4   HEMATOCRIT % 39.8   WBC 10*3/mm3 5.12     Results from last 7 days   Lab Units 06/12/23  0853   PLATELETS 10*3/mm3 214     No results found for: COVID19  No results found for: HGBA1C                 PES STATEMENT / NUTRITION DIAGNOSIS      Nutrition Dx Problem Problem:    Knowledge Deficit    Etiology:  Medical diagnosis: Breast cancer  Factors affecting nutrition: Reported/Observed By      Signs/Symptoms:  Information Deficit    Comment:      NUTRITION INTERVENTION / PLAN OF CARE      Intervention Goal(s) Provide information         RD Intervention/Action Other: will be available for any questions         Recommendations:       PO Diet       Supplements       Snacks          Monitor/Evaluation Follow up as needed   Education Education provided   --    RD to follow per protocol.    Electronically signed by:  Sonya Dsouza RD, LD  06/13/23 08:28 EDT

## 2023-06-15 ENCOUNTER — PATIENT OUTREACH (OUTPATIENT)
Dept: OTHER | Facility: HOSPITAL | Age: 63
End: 2023-06-15
Payer: COMMERCIAL

## 2023-06-15 DIAGNOSIS — C50.911 MALIGNANT NEOPLASM OF RIGHT FEMALE BREAST, UNSPECIFIED ESTROGEN RECEPTOR STATUS, UNSPECIFIED SITE OF BREAST: Primary | ICD-10-CM

## 2023-06-15 NOTE — PROGRESS NOTES
Ms. Dobbins sent email requesting assistance and support for sleeping and anxiety. Email returned and attempted to call to discuss over the phone. LVM with my contact info.       Ms. Dobbins called back and we discussed strategies to sleep better at night and to help go back to sleep. We discussed keeping a journal by bedside to write down thoughts, fears or concerns when she wake up at night. We talked about setting up her environment before hand with decrease stimuli, warm bath, hot tea, anything that helps calm her down. We discussed melatonin and she stated she cannot take it and she has to stay off magnesium until after surgery. She was thankful for the discussion and was interested in speaking with our behavioral health APRN to talk through medication management options for sleep and anxiety. That referral has been placed. She was thankful for the help and will reach out with any needs that arise.

## 2023-06-19 ENCOUNTER — ANESTHESIA (OUTPATIENT)
Dept: PERIOP | Facility: HOSPITAL | Age: 63
End: 2023-06-19
Payer: COMMERCIAL

## 2023-06-19 ENCOUNTER — ANESTHESIA EVENT (OUTPATIENT)
Dept: PERIOP | Facility: HOSPITAL | Age: 63
End: 2023-06-19
Payer: COMMERCIAL

## 2023-06-19 PROCEDURE — 25010000002 SUGAMMADEX 200 MG/2ML SOLUTION: Performed by: NURSE ANESTHETIST, CERTIFIED REGISTERED

## 2023-06-19 PROCEDURE — 25010000002 PROPOFOL 10 MG/ML EMULSION: Performed by: NURSE ANESTHETIST, CERTIFIED REGISTERED

## 2023-06-19 PROCEDURE — 25010000002 FENTANYL CITRATE (PF) 50 MCG/ML SOLUTION: Performed by: NURSE ANESTHETIST, CERTIFIED REGISTERED

## 2023-06-19 PROCEDURE — 25010000002 DEXAMETHASONE SODIUM PHOSPHATE 20 MG/5ML SOLUTION: Performed by: NURSE ANESTHETIST, CERTIFIED REGISTERED

## 2023-06-19 PROCEDURE — 25010000002 ONDANSETRON PER 1 MG: Performed by: NURSE ANESTHETIST, CERTIFIED REGISTERED

## 2023-06-19 RX ORDER — LIDOCAINE HYDROCHLORIDE 20 MG/ML
INJECTION, SOLUTION INFILTRATION; PERINEURAL AS NEEDED
Status: DISCONTINUED | OUTPATIENT
Start: 2023-06-19 | End: 2023-06-19 | Stop reason: SURG

## 2023-06-19 RX ORDER — ONDANSETRON 2 MG/ML
INJECTION INTRAMUSCULAR; INTRAVENOUS AS NEEDED
Status: DISCONTINUED | OUTPATIENT
Start: 2023-06-19 | End: 2023-06-19 | Stop reason: SURG

## 2023-06-19 RX ORDER — FENTANYL CITRATE 50 UG/ML
INJECTION, SOLUTION INTRAMUSCULAR; INTRAVENOUS AS NEEDED
Status: DISCONTINUED | OUTPATIENT
Start: 2023-06-19 | End: 2023-06-19 | Stop reason: SURG

## 2023-06-19 RX ORDER — PROPOFOL 10 MG/ML
VIAL (ML) INTRAVENOUS AS NEEDED
Status: DISCONTINUED | OUTPATIENT
Start: 2023-06-19 | End: 2023-06-19 | Stop reason: SURG

## 2023-06-19 RX ORDER — ROCURONIUM BROMIDE 10 MG/ML
INJECTION, SOLUTION INTRAVENOUS AS NEEDED
Status: DISCONTINUED | OUTPATIENT
Start: 2023-06-19 | End: 2023-06-19 | Stop reason: SURG

## 2023-06-19 RX ORDER — DEXAMETHASONE SODIUM PHOSPHATE 4 MG/ML
INJECTION, SOLUTION INTRA-ARTICULAR; INTRALESIONAL; INTRAMUSCULAR; INTRAVENOUS; SOFT TISSUE AS NEEDED
Status: DISCONTINUED | OUTPATIENT
Start: 2023-06-19 | End: 2023-06-19 | Stop reason: SURG

## 2023-06-19 RX ADMIN — PROPOFOL 150 MG: 10 INJECTION, EMULSION INTRAVENOUS at 09:55

## 2023-06-19 RX ADMIN — DEXAMETHASONE SODIUM PHOSPHATE 8 MG: 4 INJECTION, SOLUTION INTRAMUSCULAR; INTRAVENOUS at 10:01

## 2023-06-19 RX ADMIN — SUGAMMADEX 200 MG: 100 INJECTION, SOLUTION INTRAVENOUS at 11:24

## 2023-06-19 RX ADMIN — LIDOCAINE HYDROCHLORIDE 100 MG: 20 INJECTION, SOLUTION INFILTRATION; PERINEURAL at 09:53

## 2023-06-19 RX ADMIN — FENTANYL CITRATE 50 MCG: 50 INJECTION, SOLUTION INTRAMUSCULAR; INTRAVENOUS at 10:59

## 2023-06-19 RX ADMIN — ONDANSETRON 4 MG: 2 INJECTION INTRAMUSCULAR; INTRAVENOUS at 11:13

## 2023-06-19 RX ADMIN — FENTANYL CITRATE 50 MCG: 50 INJECTION, SOLUTION INTRAMUSCULAR; INTRAVENOUS at 09:53

## 2023-06-19 RX ADMIN — ROCURONIUM BROMIDE 40 MG: 10 INJECTION, SOLUTION INTRAVENOUS at 09:55

## 2023-06-19 NOTE — ANESTHESIA POSTPROCEDURE EVALUATION
Patient: Gaby Dobbins    Procedure Summary       Date: 06/19/23 Room / Location: 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        Post Anesthesia Care and Evaluation    Pain management: adequate    Airway patency: patent  Anesthetic complications: No anesthetic complications    Cardiovascular status: acceptable  Respiratory status: acceptable  Hydration status: acceptable    Comments: /83   Pulse 94   Temp 36.6 °C (97.9 °F) (Oral)   Resp 16   SpO2 100%

## 2023-06-19 NOTE — ANESTHESIA PROCEDURE NOTES
Airway  Urgency: elective    Date/Time: 6/19/2023 9:57 AM  Airway not difficult    General Information and Staff    Patient location during procedure: OR  Anesthesiologist: Aaron La MD  CRNA/CAA: Anival Lee CRNA    Indications and Patient Condition  Indications for airway management: airway protection    Preoxygenated: yes  Mask difficulty assessment: 1 - vent by mask    Final Airway Details  Final airway type: endotracheal airway      Successful airway: ETT  Cuffed: yes   Successful intubation technique: direct laryngoscopy  Endotracheal tube insertion site: oral  Blade: Barone  Blade size: 2  ETT size (mm): 7.0  Cormack-Lehane Classification: grade IIa - partial view of glottis  Placement verified by: chest auscultation and capnometry   Measured from: lips  ETT/EBT  to lips (cm): 22  Number of attempts at approach: 1  Assessment: lips, teeth, and gum same as pre-op and atraumatic intubation    Additional Comments  Pre 02 100%, SIVI, DL x1, atraumatic intubation, BLBS, Positive ETC02.

## 2023-06-19 NOTE — ANESTHESIA PREPROCEDURE EVALUATION
Anesthesia Evaluation     Patient summary reviewed and Nursing notes reviewed                Airway   Mallampati: I  TM distance: >3 FB  Neck ROM: full  No difficulty expected  Dental - normal exam     Pulmonary - negative pulmonary ROS and normal exam   Cardiovascular - negative cardio ROS and normal exam  Exercise tolerance: good (4-7 METS)        Neuro/Psych  (+) headaches  GI/Hepatic/Renal/Endo - negative ROS     Musculoskeletal (-) negative ROS    Abdominal  - normal exam    Bowel sounds: normal.   Substance History - negative use     OB/GYN negative ob/gyn ROS         Other      history of cancer active                  Anesthesia Plan    ASA 2     general     intravenous induction     Anesthetic plan, risks, benefits, and alternatives have been provided, discussed and informed consent has been obtained with: patient.  Pre-procedure education provided    CODE STATUS:

## 2023-07-17 ENCOUNTER — HOSPITAL ENCOUNTER (OUTPATIENT)
Dept: RADIATION ONCOLOGY | Facility: HOSPITAL | Age: 63
Setting detail: RADIATION/ONCOLOGY SERIES
End: 2023-07-17
Payer: COMMERCIAL

## 2023-07-17 PROCEDURE — 77263 THER RADIOLOGY TX PLNG CPLX: CPT | Performed by: RADIOLOGY

## 2023-07-18 PROCEDURE — 77290 THER RAD SIMULAJ FIELD CPLX: CPT | Performed by: RADIOLOGY

## 2023-07-18 PROCEDURE — 77333 RADIATION TREATMENT AID(S): CPT | Performed by: RADIOLOGY

## 2023-07-19 PROCEDURE — 77300 RADIATION THERAPY DOSE PLAN: CPT | Performed by: RADIOLOGY

## 2023-07-19 PROCEDURE — 77334 RADIATION TREATMENT AID(S): CPT | Performed by: RADIOLOGY

## 2023-07-19 PROCEDURE — 77295 3-D RADIOTHERAPY PLAN: CPT | Performed by: RADIOLOGY

## 2023-07-25 ENCOUNTER — CONSULT (OUTPATIENT)
Dept: ONCOLOGY | Facility: CLINIC | Age: 63
End: 2023-07-25
Payer: COMMERCIAL

## 2023-07-25 ENCOUNTER — RADIATION ONCOLOGY WEEKLY ASSESSMENT (OUTPATIENT)
Dept: RADIATION ONCOLOGY | Facility: HOSPITAL | Age: 63
End: 2023-07-25
Payer: COMMERCIAL

## 2023-07-25 ENCOUNTER — LAB (OUTPATIENT)
Dept: LAB | Facility: HOSPITAL | Age: 63
End: 2023-07-25
Payer: COMMERCIAL

## 2023-07-25 ENCOUNTER — HOSPITAL ENCOUNTER (OUTPATIENT)
Dept: RADIATION ONCOLOGY | Facility: HOSPITAL | Age: 63
Discharge: HOME | End: 2023-07-25
Payer: COMMERCIAL

## 2023-07-25 VITALS
HEART RATE: 93 BPM | WEIGHT: 159.3 LBS | BODY MASS INDEX: 29.32 KG/M2 | TEMPERATURE: 97.1 F | DIASTOLIC BLOOD PRESSURE: 82 MMHG | SYSTOLIC BLOOD PRESSURE: 142 MMHG | OXYGEN SATURATION: 98 % | HEIGHT: 62 IN

## 2023-07-25 VITALS
WEIGHT: 159.2 LBS | BODY MASS INDEX: 29.11 KG/M2 | HEART RATE: 74 BPM | DIASTOLIC BLOOD PRESSURE: 83 MMHG | SYSTOLIC BLOOD PRESSURE: 134 MMHG | OXYGEN SATURATION: 100 %

## 2023-07-25 DIAGNOSIS — C50.919 MALIGNANT NEOPLASM OF FEMALE BREAST, UNSPECIFIED ESTROGEN RECEPTOR STATUS, UNSPECIFIED LATERALITY, UNSPECIFIED SITE OF BREAST: Primary | ICD-10-CM

## 2023-07-25 DIAGNOSIS — C50.911 DUCTAL CARCINOMA IN SITU (DCIS) OF RIGHT BREAST WITH MICROINVASIVE COMPONENT: Primary | ICD-10-CM

## 2023-07-25 LAB
BASOPHILS # BLD AUTO: 0.05 10*3/MM3 (ref 0–0.2)
BASOPHILS NFR BLD AUTO: 0.8 % (ref 0–1.5)
DEPRECATED RDW RBC AUTO: 43.1 FL (ref 37–54)
EOSINOPHIL # BLD AUTO: 0.47 10*3/MM3 (ref 0–0.4)
EOSINOPHIL NFR BLD AUTO: 7.1 % (ref 0.3–6.2)
ERYTHROCYTE [DISTWIDTH] IN BLOOD BY AUTOMATED COUNT: 13.7 % (ref 12.3–15.4)
HCT VFR BLD AUTO: 39.8 % (ref 34–46.6)
HGB BLD-MCNC: 13.3 G/DL (ref 12–15.9)
IMM GRANULOCYTES # BLD AUTO: 0.05 10*3/MM3 (ref 0–0.05)
IMM GRANULOCYTES NFR BLD AUTO: 0.8 % (ref 0–0.5)
LYMPHOCYTES # BLD AUTO: 2.09 10*3/MM3 (ref 0.7–3.1)
LYMPHOCYTES NFR BLD AUTO: 31.5 % (ref 19.6–45.3)
MCH RBC QN AUTO: 28.9 PG (ref 26.6–33)
MCHC RBC AUTO-ENTMCNC: 33.4 G/DL (ref 31.5–35.7)
MCV RBC AUTO: 86.5 FL (ref 79–97)
MONOCYTES # BLD AUTO: 0.67 10*3/MM3 (ref 0.1–0.9)
MONOCYTES NFR BLD AUTO: 10.1 % (ref 5–12)
NEUTROPHILS NFR BLD AUTO: 3.3 10*3/MM3 (ref 1.7–7)
NEUTROPHILS NFR BLD AUTO: 49.7 % (ref 42.7–76)
NRBC BLD AUTO-RTO: 0 /100 WBC (ref 0–0.2)
PLATELET # BLD AUTO: 185 10*3/MM3 (ref 140–450)
PMV BLD AUTO: 11.6 FL (ref 6–12)
RAD ONC ARIA COURSE ID: NORMAL
RAD ONC ARIA COURSE INTENT: NORMAL
RAD ONC ARIA COURSE LAST TREATMENT DATE: NORMAL
RAD ONC ARIA COURSE START DATE: NORMAL
RAD ONC ARIA COURSE TREATMENT ELAPSED DAYS: 0
RAD ONC ARIA FIRST TREATMENT DATE: NORMAL
RAD ONC ARIA PLAN FRACTIONS TREATED TO DATE: 1
RAD ONC ARIA PLAN ID: NORMAL
RAD ONC ARIA PLAN PRESCRIBED DOSE PER FRACTION: 2.66 GY
RAD ONC ARIA PLAN PRIMARY REFERENCE POINT: NORMAL
RAD ONC ARIA PLAN TOTAL FRACTIONS PRESCRIBED: 16
RAD ONC ARIA PLAN TOTAL PRESCRIBED DOSE: 4256 CGY
RAD ONC ARIA REFERENCE POINT DOSAGE GIVEN TO DATE: 2.66 GY
RAD ONC ARIA REFERENCE POINT ID: NORMAL
RAD ONC ARIA REFERENCE POINT SESSION DOSAGE GIVEN: 2.66 GY
RBC # BLD AUTO: 4.6 10*6/MM3 (ref 3.77–5.28)
WBC NRBC COR # BLD: 6.63 10*3/MM3 (ref 3.4–10.8)

## 2023-07-25 PROCEDURE — 85025 COMPLETE CBC W/AUTO DIFF WBC: CPT

## 2023-07-25 PROCEDURE — 77427 RADIATION TX MANAGEMENT X5: CPT | Performed by: RADIOLOGY

## 2023-07-25 PROCEDURE — 77412 RADIATION TX DELIVERY LVL 3: CPT | Performed by: RADIOLOGY

## 2023-07-25 PROCEDURE — 77280 THER RAD SIMULAJ FIELD SMPL: CPT | Performed by: RADIOLOGY

## 2023-07-25 PROCEDURE — 36415 COLL VENOUS BLD VENIPUNCTURE: CPT

## 2023-07-25 NOTE — PROGRESS NOTES
Subjective   Gaby Dobbins is a 63 y.o. female. Referred by Dr. Mojica for microinvasive carcinoma of the right breast.    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  She denies palpating any abnormal masses in either breast, skin or nipple changes prior to this.    No family history of breast or ovarian carcinoma.    3/20/2023-bilateral screening mammogram  Calcifications in the right breast require additional evaluation.  Magnification is recommended.    Right breast diagnostic mammogram  New calcifications in the right breast are suspicious.  Stereotactic biopsy is recommended.    5/15/2023-right breast stereoactic biopsy  Pathology consistent with high-grade solid ductal carcinoma in situ with rare foci of irregular ductal clusters.  Highly suspicious for invasive carcinoma.  DCIS-high-grade, size 3 mm  Microcalcifications are present in DCIS  DCIS is ER negative  NE negative  Ki-67 88.66%    Pathology reviewed at [facility]  Right breast 8:00 stereotactic biopsy consistent with microinvasive carcinoma 0.9 mm arising out of a background of high-grade ductal carcinoma in situ with a brisk lymphocytic response.  ER negative, NE negative    Invitae 47 gene panel negative    5/19/2023-bilateral breast MRI-3.4 cm biopsy cavity at 7:00 in the posterior right breast represents biopsy-proven malignancy.  No suspicious enhancement is identified at the biopsy site and the biopsy clip is laterally displaced.  Surgical management is recommended.  No MRI evidence of malignancy in the left breast.     6/19/2023-right breast lumpectomy  High-grade ductal carcinoma in situ measuring 12 mm  Margins are negative for in situ carcinoma  2 sentinel lymph nodes negative  ER negative  NE negative  HER2 1+    pT1 MI N0 M0    Patient has met with radiation oncology and plans to start radiation 7/25/2023.    She  presents to the clinic accompanied by her  to discuss adjuvant therapy.      The following portions of the patient's history were reviewed and updated as appropriate: allergies, current medications, past family history, past medical history, past social history, past surgical history, and problem list.    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     Migraines         Past Surgical History:   Procedure Laterality Date    BLEPHAROPLASTY Left     LOWER    BREAST LUMPECTOMY WITH SENTINEL NODE BIOPSY Right 2023    Procedure: Right needle-localized, bracketed, partial mastectomy and sentinel lymph node biopsy;  Surgeon: Kristin Mojica MD;  Location: [facility];  Service: General;  Laterality: Right;    KIDNEY STONE SURGERY      KNEE MENISCAL REPAIR          Family History   Problem Relation Age of Onset    Hypertension Father     Malig Hyperthermia Neg Hx         Social History     Socioeconomic History    Marital status:     Number of children: 2   Tobacco Use    Smoking status: Never    Smokeless tobacco: Never   Vaping Use    Vaping Use: Never used   Substance and Sexual Activity    Alcohol use: Yes     Comment: SOCIAL    Drug use: Never    Sexual activity: Defer        OB History    No obstetric history on file.      Age at menarche-14  Age at first live childbirth-32   2 para 2  0  Breast-feeding-6 to 8 weeks  Age at menopause-in her early 50s  Oral conceptive pill use for greater than 25 years  No use of hormone replacement    Allergies   Allergen Reactions    Moxifloxacin Hives            Review of Systems   Constitutional: Negative.    HENT: Negative.     Eyes: Negative.    Respiratory: Negative.     Cardiovascular: Negative.    Gastrointestinal: Negative.    Endocrine: Negative.    Genitourinary: Negative.  Positive for amenorrhea.   Musculoskeletal: Negative.    Skin:  Negative.    Allergic/Immunologic: Negative.    Neurological: Negative.    Hematological: Negative.    Psychiatric/Behavioral:  The patient is nervous/anxious.        Objective   There were no vitals taken for this visit.   Physical Exam  Constitutional:       Appearance: Normal appearance. She is normal weight.   HENT:      Head: Normocephalic and atraumatic.      Right Ear: External ear normal.      Left Ear: External ear normal.      Nose: Nose normal.      Mouth/Throat:      Pharynx: Oropharynx is clear.   Eyes:      Conjunctiva/sclera: Conjunctivae normal.   Cardiovascular:      Rate and Rhythm: Normal rate.   Pulmonary:      Effort: Pulmonary effort is normal.   Abdominal:      General: Abdomen is flat.   Musculoskeletal:         General: Normal range of motion.      Cervical back: Normal range of motion.   Skin:     General: Skin is warm.   Neurological:      General: No focal deficit present.      Mental Status: She is alert and oriented to person, place, and time.   Psychiatric:         Mood and Affect: Mood normal.         Behavior: Behavior normal.         Thought Content: Thought content normal.         Judgment: Judgment normal.       No visits with results within 30 Day(s) from this visit.   Latest known visit with results is:   Admission on 06/19/2023, Discharged on 06/19/2023   Component Date Value Ref Range Status    Case Report 06/19/2023    Final                    Value:Surgical Pathology Report                         Case: [id number]                                  Authorizing Provider:  Kristin Mojica MD    Collected:           06/19/2023 10:37 AM          Ordering Location:     [facility]  Received:            06/19/2023 11:01 AM                                 MAIN OR                                                                      Pathologist:           Hannah Velazquez MD                                                          Specimens:   1) - Breast, Right,  right partial mastectomy (ink marks margins)                                    2) - Breast, Right, additional anterior, superior, and medial margins (ink marks                    margins)                                                                                            3) - Breast, Right, additional inferior and lateral margins (ink marks margins)                     4) - Axilla, Right, right axilla sentinel lymph node #1 (hot and blue, count 3600)                                            5) - Axilla, Right, right axilla sentinel lymph node #2 (hot and blue, count 1000)         Clinical Information 06/19/2023    Final                    Value:This result contains rich text formatting which cannot be displayed here.    Final Diagnosis 06/19/2023    Final                    Value:This result contains rich text formatting which cannot be displayed here.    Synoptic Checklist 06/19/2023    Final                    Value:INVASIVE CARCINOMA OF THE BREAST: Resection                            INVASIVE CARCINOMA OF THE BREAST: COMPLETE EXCISION - All Specimens                            8th Edition - Protocol posted: 3/22/2023                                                        SPECIMEN                               Procedure:    Excision (less than total mastectomy)                                Specimen Laterality:    Right                                                         TUMOR                               Tumor Site:    Clock position                                :    8 o'clock                              Histologic Type:    No residual invasive carcinoma                              Tumor Size:    No residual invasive carcinoma                              Ductal Carcinoma In Situ (DCIS):    Present                                :    Negative for extensive intraductal component (EIC)                                Size (Extent) of DCIS:    Estimated size (extent) of DCIS is at least  (Millimeters): 12 mm                               Architectural Patterns:    Solid                                Nuclear Grade:    Grade III (high)                                Necrosis:    Not identified                              Lobular Carcinoma In Situ (LCIS):    Not identified                              Lymphatic and / or Vascular Invasion:    Not identified                              Microcalcifications:    Present in DCIS                              Treatment Effect in the Breast:    No known presurgical therapy                                                         MARGINS                             Margin Status for DCIS:    All margins negative for DCIS                                Distance from DCIS to Closest Margin:    7 mm                               Closest Margin(s) to DCIS:    Posterior                                Distance from DCIS to Anterior Margin:    26 mm                               Distance from DCIS to Posterior Margin:    7 mm                               Distance from DCIS to Superior Margin:    21 mm                               Distance from DCIS to Inferior Margin:    18.5 mm                               Distance from DCIS to Medial Margin:    54 mm                               Distance from DCIS to Lateral Margin:    26 mm                                                        REGIONAL LYMPH NODES                             Regional Lymph Node Status:                                   :    All regional lymph nodes negative for tumor                                Total Number of Lymph Nodes Examined (sentinel and non-sentinel):    2                                Number of [place] Nodes Examined:    2                                                         pTNM CLASSIFICATION (AJCC 8th Edition)                               Reporting of pT, pN, and (when applicable) pM categories is based on information available to the pathologist at the time the report is  issued. As per the AJCC (Chapter 1, 8th Ed.) it is the managing physician’s responsibility to establish the final pathologic stage based upon all pertinent information, including but potentially not limited to this pathology report.                             pT Category:    pT1mi                              pN Category:    pN0                              N Suffix:    (sn)                                                         SPECIAL STUDIES                               Estrogen Receptor (ER) Status:    Negative                                Progesterone Receptor (PgR) Status:    Negative                                HER2 (by immunohistochemistry):    Negative (Score 1+)                                Ki-67 Percentage of Positive Nuclei:    88.66 %                               Testing Performed on Case Number:    [id number]       Comment 06/19/2023    Final                    Value:This result contains rich text formatting which cannot be displayed here.    Gross Description 06/19/2023    Final                    Value:This result contains rich text formatting which cannot be displayed here.        No radiology results for the last 30 days.       Assessment & Plan       *Right breast microinvasive ductal carcinoma and DCIS  PT 1 MI N0 M0, tumor is triple negative with a Ki-67 of over 80%, anatomic stage Ia and prognostic stage Ia.  Tumor was arising in a background of high-grade ductal carcinoma in situ   Status post right lumpectomy on 6/19/2023, margins negative, 2 sentinel lymph nodes negative  Scheduled to start adjuvant radiation 7/25/2023  Discussed at length the details of imaging and pathology report.Discussed the origin of breast cancer from the ducts and the lobules and the histological type of breast cancer based on site of origin. Discussed the tumor size, lymph node status and stage of the cancer. Explained the presence of DCIS. Discussed the receptor status including ER, MI and her-2 mark and  their significance in determining the biology and treatment. Also discussed the importance of grade and ki-67.   Since the tumor is microinvasive and triple negative there is no additional benefit from adjuvant endocrine therapy or adjuvant chemotherapy.  We will continue with surveillance at this time.  We discussed the risk of recurrence.  She will continue with every 6 monthly breast exams.  Continue with annual mammograms    *She had several questions regarding supplemental use such as vitamin D and vitamin C.  I explained to her that it would be appropriate to correct low vitamin D levels however there is no clear benefit from excessive vitamin D or vitamin C intake.  She verbalized understanding.  She currently takes a multivitamin.     *Follow-up-6 months    60 minutes spent on the encounter including reviewing the medical records, face-to-face time and documentation of the same

## 2023-07-25 NOTE — PROGRESS NOTES
Physician Weekly Management Note    Diagnosis:     Diagnosis Plan   1. 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   Yes  New medications given?   No  Problemlist reviewed?   Yes  Problem added?   No  Issues raised requiring referral to support services - task assigned to:  na    Technical aspects reviewed:  Weekly OBI approved?   Yes  Weekly port films approved?   Yes  Change requests noted on port film?   No  Patient setup and plan reviewed?   Yes    Chart Reviewed:  Continue current treatment plan?   Yes  Treatment plan change requested?   No  CBC reviewed?   No  Concurrent Chemo?   No    Objective     Toxicities:   none     Review of Systems   Constitutional: Negative.    Skin: Negative.         There were no vitals filed for this visit.        7/25/2023    12:09 PM   Current Status   ECOG score 0       Physical Exam  Constitutional:       Appearance: Normal appearance.   Chest:          Comments: No erythema  Neurological:      Mental Status: She is alert.           Problem Summary List    Diagnosis:     Diagnosis Plan   1. 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    • Migraines          Past Surgical History:   Procedure Laterality Date   • BLEPHAROPLASTY Left     LOWER   • BREAST LUMPECTOMY WITH SENTINEL NODE BIOPSY Right 6/19/2023    Procedure: Right needle-localized, bracketed, partial mastectomy and sentinel lymph node biopsy;  Surgeon: Kristin Mojica MD;  Location: [facility];  Service: General;  Laterality: Right;   • KIDNEY STONE SURGERY  2000   • KNEE  MENISCAL REPAIR           Current Outpatient Medications on File Prior to Visit   Medication Sig Dispense Refill   • cetirizine (zyrTEC) 10 MG tablet Take 1 tablet by mouth Daily As Needed for Allergies.     • Cholecalciferol (Vitamin D3) 25 MCG (1000 UT) capsule      • fexofenadine-pseudoephedrine (ALLEGRA-D 24) 180-240 MG per 24 hr tablet Take 1 tablet by mouth Daily. 90 tablet 1   • gabapentin (Neurontin) 100 MG capsule Take 1 capsule by mouth 3 (Three) Times a Day. 90 capsule 0   • MAGNESIUM PO Take  by mouth. HOLD FOR SURGERY     • Multiple Vitamin (MULTI VITAMIN) tablet Take  by mouth. HOLD FOR SURGERY     • SUMAtriptan-naproxen (TREXIMET)  MG per tablet TAKE ONE TABLET BY MOUTH AT ONSET OF HEADACHE. MAY REPEAT AFTER TWO HOURS. MAX TWO DOSES DAILY (Patient taking differently: TAKE ONE TABLET BY MOUTH AT ONSET OF HEADACHE. MAY REPEAT AFTER TWO HOURS. MAX TWO DOSES DAILY  HOLD PER MD INSTRUCTIONS PRIOR TO SURGERY) 9 tablet 11     No current facility-administered medications on file prior to visit.       Allergies   Allergen Reactions   • Moxifloxacin Hives         Referring Provider:    No referring provider defined for this encounter.    Oncologist:  No primary care provider on file.      Seen and approved by:  Shyann Thomson MD  07/25/2023

## 2023-07-26 ENCOUNTER — OFFICE VISIT (OUTPATIENT)
Dept: PSYCHIATRY | Facility: HOSPITAL | Age: 63
End: 2023-07-26
Payer: COMMERCIAL

## 2023-07-26 ENCOUNTER — PATIENT ROUNDING (BHMG ONLY) (OUTPATIENT)
Dept: ONCOLOGY | Facility: CLINIC | Age: 63
End: 2023-07-26
Payer: COMMERCIAL

## 2023-07-26 ENCOUNTER — HOSPITAL ENCOUNTER (OUTPATIENT)
Dept: PHYSICAL THERAPY | Facility: HOSPITAL | Age: 63
Setting detail: THERAPIES SERIES
Discharge: HOME | End: 2023-07-26
Payer: COMMERCIAL

## 2023-07-26 DIAGNOSIS — C50.911 DUCTAL CARCINOMA IN SITU (DCIS) OF RIGHT BREAST WITH MICROINVASIVE COMPONENT: ICD-10-CM

## 2023-07-26 DIAGNOSIS — Z91.89 AT RISK FOR LYMPHEDEMA: Primary | ICD-10-CM

## 2023-07-26 DIAGNOSIS — F43.23 ADJUSTMENT DISORDER WITH MIXED ANXIETY AND DEPRESSED MOOD: Primary | ICD-10-CM

## 2023-07-26 DIAGNOSIS — Z98.890 S/P LUMPECTOMY, RIGHT BREAST: ICD-10-CM

## 2023-07-26 PROCEDURE — 97110 THERAPEUTIC EXERCISES: CPT

## 2023-07-26 PROCEDURE — 93702 BIS XTRACELL FLUID ANALYSIS: CPT

## 2023-07-26 PROCEDURE — 97161 PT EVAL LOW COMPLEX 20 MIN: CPT

## 2023-07-26 PROCEDURE — 77412 RADIATION TX DELIVERY LVL 3: CPT | Performed by: RADIOLOGY

## 2023-07-26 PROCEDURE — 97535 SELF CARE MNGMENT TRAINING: CPT

## 2023-07-26 NOTE — THERAPY EVALUATION
Physical Therapy Lymphedema Initial Evaluation  [facility]     Patient Name: Gaby Dobbins  : 1960  MRN: [id number]  Today's Date: 2023      Visit Date: 2023    Visit Dx:    ICD-10-CM ICD-9-CM   1. At risk for lymphedema  Z91.89 V49.89   2. S/P lumpectomy, right breast  Z98.890 V45.89   3. 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 At risk for lymphedema  Z91.89 V49.89   2. S/P lumpectomy, right breast  Z98.890 V45.89   3. 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 Pack. 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 She has returned to playing tennis and [place] classes with some modifications. She denies pain or swelling. She began radiation yesterday. She as a home  in [place] she plans to travel to once radiation is completed.  -LB      Previous treatment for THIS PROBLEM Surgery  -LB      Surgery Date: 06/19/23  -LB      Patient/Caregiver Goals Return to prior level of function;Know what to do to help the symptoms  -LB      Hand Dominance right-handed  -LB      Occupation/sports/leisure activities works for non-profit  -LB      Patient seeing anyone else for problem(s)? yes  -LB      How has patient tried to help current problem? slow return to sport  -LB      History of Previous Related Injuries R shoulder pain intermittently  -LB         Pain     Pain at Present 0  -LB      Pain at Best 0  -LB      Pain at Worst 0  -LB      Is your sleep disturbed? No  -LB      Difficulties at work? No issues.  -LB      Difficulties with ADL's? No issues.  -LB      Difficulties with recreational activities? Modified but tolerating.  -LB         Fall Risk Assessment    Any falls in the past year: No  -LB         Services    Prior Rehab/Home Health Experiences No  -LB      Are you currently receiving Home Health services No  -LB      Do you plan to receive Home Health services in the near future No  -LB         Daily Activities    Primary Language English  -LB      Pt Participated in POC and Goals Yes  -LB         Safety    Are you being hurt, hit, or frightened by anyone at home or in your life? No  -LB      Are you being neglected by a caregiver No  -LB      Have you had any of the following issues with N/A  -LB                User Key  (r) = Recorded By, (t) = Taken By, (c) = Cosigned By      Initials Name Provider Type    Lindsay Rodriguez, PT Physical Therapist                     Lymphedema       Row Name 07/26/23 1300             Subjective Pain    Able to rate subjective pain? yes  -LB      Pre-Treatment Pain Level 0  -LB         Subjective Comments    Subjective Comments I am doing well. Just a little sore where I am always sore, my shoulder/neck.  -LB         Lymphedema Assessment     Lymphedema Classification RUE:;at risk/stage 0  -LB      Lymphedema Cancer Related Sx right;lumpectomy;sentinel node biopsy  -LB      Lymphedema Surgery Comments 6/19/23  -LB      Lymph Nodes Removed # 2  -LB      Positive Lymph Nodes # 0  -LB      Chemo Received no  -LB      Radiation Therapy Received yes  -LB      Radiation Treatments #/Timeframe 1/16  -LB      Infections or Cellulitis? no  -LB         Posture/Observations    Posture/Observations Comments WNL; slightly forward head, L shoulder elevated  -LB         General ROM    GENERAL ROM COMMENTS BUE WFL  -LB         MMT (Manual Muscle Testing)    General MMT Comments BUE WFL  -LB         Lymphedema Edema Assessment    Edema Assessment Comment no obvious edema; obvious inc UT tightness R with tendency to overactivate with any UE movement  -LB         Skin Changes/Observations    Skin Observations Comment well healed incision  -LB         Lymphedema Sensation    Lymphedema Sensation Comments normal  -LB         Lymphedema Pulses/Capillary Refill    Lymph Pulses Capillary Refill Comments normal  -LB         Compression/Skin Care    Compression/Skin Care Comments discussed compression garment for travel  -LB         L-Dex Bioimpedence Screening    L-Dex Measurement Extremity RUE  -LB      L-Dex Patient Position Standing  -LB      L-Dex UE Dominate Side Right  -LB      L-Dex UE At Risk Side Right  -LB      L-Dex UE Pre Surgical Value No  -LB      L-Dex UE Score 0.9  -LB      L-Dex UE Baseline Score 0.9  -LB      L-Dex UE Value Change 0  -LB      L-Dex UE Comment WNL  -LB      $ L-Dex Charge yes  -LB                User Key  (r) = Recorded By, (t) = Taken By, (c) = Cosigned By      Initials Name Provider Type    Lindsay Rodriguez PT Physical Therapist                                    Therapy Education  Education Details: issued MEDMingleverse: [id number]; discussed posture, s/s of lymphedema steps to prevention, LDex bioimpedance results and interpretation, compression  garment use with travel  Given: HEP, Mobility training, Symptoms/condition management, Edema management, Posture/body mechanics  Program: New  How Provided: Verbal, Demonstration, Written  Provided to: Patient  Level of Understanding: Verbalized, Teach back education performed, Demonstrated  02691 - PT Self Care/Mgmt Minutes: 15       OP Exercises       Row Name 07/26/23 1300             Subjective Comments    Subjective Comments I am doing well. Just a little sore where I am always sore, my shoulder/neck.  -LB         Subjective Pain    Able to rate subjective pain? yes  -LB      Pre-Treatment Pain Level 0  -LB         Total Minutes    00586 - PT Therapeutic Exercise Minutes 15  -LB         Exercise 1    Exercise Name 1 tbannd row  -LB      Sets 1 2  -LB      Reps 1 10  -LB      Time 1 RTB  -LB         Exercise 2    Exercise Name 2 seated UT stretch  -LB      Reps 2 3  -LB      Time 2 20  -LB         Exercise 3    Exercise Name 3 SL ER  -LB      Sets 3 2  -LB      Reps 3 10  -LB         Exercise 4    Exercise Name 4 supine sAP  -LB      Sets 4 2  -LB      Reps 4 10  -LB         Exercise 5    Exercise Name 5 supine HA  -LB      Sets 5 2  -LB      Reps 5 10  -LB      Time 5 RTB  -LB                User Key  (r) = Recorded By, (t) = Taken By, (c) = Cosigned By      Initials Name Provider Type    Lindsay Rodriguez, PT Physical Therapist                                 PT OP Goals       Row Name 07/26/23 1300          Long Term Goals    LTG Date to Achieve 08/25/23  -LB     LTG 1 Pt will maintain LDex bioimpedance WNL.  -LB     LTG 1 Progress New  -LB     LTG 2 Pt will verbalize s/s of lymphedema and steps to prevention.  -LB     LTG 2 Progress New  -LB     LTG 3 Pt will acquire appropriately fitted compression garment and verbalize appropriate wear schedule.  -LB     LTG 3 Progress New  -LB        Time Calculation    PT Goal Re-Cert Due Date 10/24/23  -LB               User Key  (r) = Recorded By, (t) = Taken By, (c) =  Cosigned By      Initials Name Provider Type    LB Lindsay Kat, PT Physical Therapist                     PT Assessment/Plan       Row Name 07/26/23 8766          PT Assessment    Functional Limitations Other (comment);Limitations in functional capacity and performance  at risk for lymphedema  -LB     Impairments Impaired flexibility;Impaired lymphatic circulation;Posture;Pain  -LB     Assessment Comments Gaby Dobbins is a 63 y.o. female recently diagnosed with Right Breast Cancer, presents to therapy in evolving condition, s/p Right Lumpectomywith [place] Node Biopsy performed on 6/19/23 by surgeons Dr. Mojica. Gaby Dobbins is at increased risk of post Lumpectomy lymphedema syndrome Right Upper Extremity due to Radiation therapy Breast surgery Lymph Node Removal . They presents with post-operative decreased range of motion, flexibility, strength, function and increased pain. Currently, negative s/s of lymphedema, infection, seroma, or axillary cording. We did complete a baseline post operative bioimpedance assessment, with current L-Dex score of .9 , which is WNL. At this time, functional limitations can be affected by but not limited to at risk for lymphedema, R shoulder pain. Gaby Dobbins will benefit from skilled formal Breast Care Physical Therapy at this time to address listed dysfunctions. Please refer to Plan.  -LB     Rehab Potential Good  -LB     Patient/caregiver participated in establishment of treatment plan and goals Yes  -LB     Patient would benefit from skilled therapy intervention Yes  -LB        PT Plan    PT Frequency Other (comment)  -LB     Predicted Duration of Therapy Intervention (PT) repeat bioimpedance post radiation  -LB     Planned CPT's? PT EVAL LOW COMPLEXITY: 31159;PT RE-EVAL: 71275;PT THER PROC EA 15 MIN: 19199;PT MANUAL THERAPY EA 15 MIN: 51774;PT THER ACT EA 15 MIN: 12525;PT NEUROMUSC RE-EDUCATION EA 15 MIN: 63430;PT SELF CARE/HOME MGMT/TRAIN EA 15: 52278;PT  BIS XTRACELL FLUID ANALYSIS: 49309  -LB     PT Plan Comments progress HEP to include prone T,Y,W, extension, consider D2 flexion, compression fit? bioimpedance post radiation/pre-travel  -LB               User Key  (r) = Recorded By, (t) = Taken By, (c) = Cosigned By      Initials Name Provider Type    LB Lindsay Kat, PT Physical Therapist                       Outcome Measure Options: Quick DASH  Quick DASH  Open a tight or new jar.: No Difficulty  Do heavy household chores (e.g., wash walls, wash floors): No Difficulty  Carry a shopping bag or briefcase: No Difficulty  Wash your back: No Difficulty  Use a knife to cut food: No Difficulty  Recreational activities in which you take some force or impact through your arm, should or hand (e.g. golf, hammering, tennis, etc.): Mild Difficulty  During the past week, to what extent has your arm, shoulder, or hand problem interfered with your normal social activites with family, friends, neighbors or groups?: Not at all  During the past week, were you limited in your work or other regular daily activities as a result of your arm, shoulder or hand problem?: Not limited at all  Arm, Shoulder, or hand pain: None  Tingling (pins and needles) in your arm, shoulder, or hand: None  During the past week, how much difficulty have you had sleeping because of the pain in your arm, shoulder or hand?: No difficulty  Number of Questions Answered: 11  Quick DASH Score: 2.27         Time Calculation:   Start Time: 1045  Stop Time: 1130  Time Calculation (min): 45 min  Total Timed Code Minutes- PT: 30 minute(s)  Timed Charges  98944 - PT Therapeutic Exercise Minutes: 15  55630 - PT Self Care/Mgmt Minutes: 15  Total Minutes  Timed Charges Total Minutes: 15   Total Minutes: 15   Therapy Charges for Today       Code Description Service Date Service Provider Modifiers Qty    61457520024 HC PT BIS XTRACELL FLUID ANALYSIS 7/26/2023 Lindsay Kat, PT  1    20720529105 HC PT THER PROC EA 15 MIN  7/26/2023 Lindsay Kat, PT GP 1    20903648410 HC PT SELF CARE/MGMT/TRAIN EA 15 MIN 7/26/2023 Lindsay Kat, PT GP 1    55295292014 HC PT EVAL LOW COMPLEXITY 1 7/26/2023 Lindsay Kat, PT GP 1            PT G-Codes  Outcome Measure Options: Quick DASH  Quick DASH Score: 2.27         Lindsay Kat, JOEL  7/26/2023

## 2023-07-27 ENCOUNTER — HOSPITAL ENCOUNTER (OUTPATIENT)
Dept: RADIATION ONCOLOGY | Facility: HOSPITAL | Age: 63
Discharge: HOME | End: 2023-07-27
Payer: COMMERCIAL

## 2023-07-27 LAB
RAD ONC ARIA COURSE ID: NORMAL
RAD ONC ARIA COURSE ID: NORMAL
RAD ONC ARIA COURSE INTENT: NORMAL
RAD ONC ARIA COURSE INTENT: NORMAL
RAD ONC ARIA COURSE LAST TREATMENT DATE: NORMAL
RAD ONC ARIA COURSE LAST TREATMENT DATE: NORMAL
RAD ONC ARIA COURSE START DATE: NORMAL
RAD ONC ARIA COURSE START DATE: NORMAL
RAD ONC ARIA COURSE TREATMENT ELAPSED DAYS: 1
RAD ONC ARIA COURSE TREATMENT ELAPSED DAYS: 2
RAD ONC ARIA FIRST TREATMENT DATE: NORMAL
RAD ONC ARIA FIRST TREATMENT DATE: NORMAL
RAD ONC ARIA PLAN FRACTIONS TREATED TO DATE: 2
RAD ONC ARIA PLAN FRACTIONS TREATED TO DATE: 3
RAD ONC ARIA PLAN ID: NORMAL
RAD ONC ARIA PLAN ID: NORMAL
RAD ONC ARIA PLAN PRESCRIBED DOSE PER FRACTION: 2.66 GY
RAD ONC ARIA PLAN PRESCRIBED DOSE PER FRACTION: 2.66 GY
RAD ONC ARIA PLAN PRIMARY REFERENCE POINT: NORMAL
RAD ONC ARIA PLAN PRIMARY REFERENCE POINT: NORMAL
RAD ONC ARIA PLAN TOTAL FRACTIONS PRESCRIBED: 16
RAD ONC ARIA PLAN TOTAL FRACTIONS PRESCRIBED: 16
RAD ONC ARIA PLAN TOTAL PRESCRIBED DOSE: 4256 CGY
RAD ONC ARIA PLAN TOTAL PRESCRIBED DOSE: 4256 CGY
RAD ONC ARIA REFERENCE POINT DOSAGE GIVEN TO DATE: 5.32 GY
RAD ONC ARIA REFERENCE POINT DOSAGE GIVEN TO DATE: 7.98 GY
RAD ONC ARIA REFERENCE POINT ID: NORMAL
RAD ONC ARIA REFERENCE POINT ID: NORMAL
RAD ONC ARIA REFERENCE POINT SESSION DOSAGE GIVEN: 2.66 GY
RAD ONC ARIA REFERENCE POINT SESSION DOSAGE GIVEN: 2.66 GY

## 2023-07-27 PROCEDURE — 77412 RADIATION TX DELIVERY LVL 3: CPT | Performed by: STUDENT IN AN ORGANIZED HEALTH CARE EDUCATION/TRAINING PROGRAM

## 2023-07-28 ENCOUNTER — HOSPITAL ENCOUNTER (OUTPATIENT)
Dept: RADIATION ONCOLOGY | Facility: HOSPITAL | Age: 63
Setting detail: RADIATION/ONCOLOGY SERIES
Discharge: HOME | End: 2023-07-28
Payer: COMMERCIAL

## 2023-07-28 LAB
RAD ONC ARIA COURSE ID: NORMAL
RAD ONC ARIA COURSE INTENT: NORMAL
RAD ONC ARIA COURSE LAST TREATMENT DATE: NORMAL
RAD ONC ARIA COURSE START DATE: NORMAL
RAD ONC ARIA COURSE TREATMENT ELAPSED DAYS: 3
RAD ONC ARIA FIRST TREATMENT DATE: NORMAL
RAD ONC ARIA PLAN FRACTIONS TREATED TO DATE: 4
RAD ONC ARIA PLAN ID: NORMAL
RAD ONC ARIA PLAN PRESCRIBED DOSE PER FRACTION: 2.66 GY
RAD ONC ARIA PLAN PRIMARY REFERENCE POINT: NORMAL
RAD ONC ARIA PLAN TOTAL FRACTIONS PRESCRIBED: 16
RAD ONC ARIA PLAN TOTAL PRESCRIBED DOSE: 4256 CGY
RAD ONC ARIA REFERENCE POINT DOSAGE GIVEN TO DATE: 10.64 GY
RAD ONC ARIA REFERENCE POINT ID: NORMAL
RAD ONC ARIA REFERENCE POINT SESSION DOSAGE GIVEN: 2.66 GY

## 2023-07-28 PROCEDURE — 77336 RADIATION PHYSICS CONSULT: CPT | Performed by: RADIOLOGY

## 2023-07-28 PROCEDURE — 77412 RADIATION TX DELIVERY LVL 3: CPT | Performed by: RADIOLOGY

## 2023-07-31 PROCEDURE — 77412 RADIATION TX DELIVERY LVL 3: CPT | Performed by: STUDENT IN AN ORGANIZED HEALTH CARE EDUCATION/TRAINING PROGRAM

## 2023-08-01 ENCOUNTER — HOSPITAL ENCOUNTER (OUTPATIENT)
Dept: RADIATION ONCOLOGY | Facility: HOSPITAL | Age: 63
Setting detail: RADIATION/ONCOLOGY SERIES
End: 2023-08-01
Payer: COMMERCIAL

## 2023-08-01 ENCOUNTER — HOSPITAL ENCOUNTER (OUTPATIENT)
Dept: RADIATION ONCOLOGY | Facility: HOSPITAL | Age: 63
Discharge: HOME | End: 2023-08-01
Payer: COMMERCIAL

## 2023-08-01 ENCOUNTER — RADIATION ONCOLOGY WEEKLY ASSESSMENT (OUTPATIENT)
Dept: RADIATION ONCOLOGY | Facility: HOSPITAL | Age: 63
End: 2023-08-01
Payer: COMMERCIAL

## 2023-08-01 VITALS
OXYGEN SATURATION: 99 % | BODY MASS INDEX: 29.07 KG/M2 | HEART RATE: 70 BPM | SYSTOLIC BLOOD PRESSURE: 120 MMHG | WEIGHT: 159 LBS | DIASTOLIC BLOOD PRESSURE: 83 MMHG

## 2023-08-01 DIAGNOSIS — C50.911 DUCTAL CARCINOMA IN SITU (DCIS) OF RIGHT BREAST WITH MICROINVASIVE COMPONENT: Primary | ICD-10-CM

## 2023-08-01 LAB
RAD ONC ARIA COURSE ID: NORMAL
RAD ONC ARIA COURSE ID: NORMAL
RAD ONC ARIA COURSE INTENT: NORMAL
RAD ONC ARIA COURSE INTENT: NORMAL
RAD ONC ARIA COURSE LAST TREATMENT DATE: NORMAL
RAD ONC ARIA COURSE LAST TREATMENT DATE: NORMAL
RAD ONC ARIA COURSE START DATE: NORMAL
RAD ONC ARIA COURSE START DATE: NORMAL
RAD ONC ARIA COURSE TREATMENT ELAPSED DAYS: 6
RAD ONC ARIA COURSE TREATMENT ELAPSED DAYS: 7
RAD ONC ARIA FIRST TREATMENT DATE: NORMAL
RAD ONC ARIA FIRST TREATMENT DATE: NORMAL
RAD ONC ARIA PLAN FRACTIONS TREATED TO DATE: 5
RAD ONC ARIA PLAN FRACTIONS TREATED TO DATE: 6
RAD ONC ARIA PLAN ID: NORMAL
RAD ONC ARIA PLAN ID: NORMAL
RAD ONC ARIA PLAN PRESCRIBED DOSE PER FRACTION: 2.66 GY
RAD ONC ARIA PLAN PRESCRIBED DOSE PER FRACTION: 2.66 GY
RAD ONC ARIA PLAN PRIMARY REFERENCE POINT: NORMAL
RAD ONC ARIA PLAN PRIMARY REFERENCE POINT: NORMAL
RAD ONC ARIA PLAN TOTAL FRACTIONS PRESCRIBED: 16
RAD ONC ARIA PLAN TOTAL FRACTIONS PRESCRIBED: 16
RAD ONC ARIA PLAN TOTAL PRESCRIBED DOSE: 4256 CGY
RAD ONC ARIA PLAN TOTAL PRESCRIBED DOSE: 4256 CGY
RAD ONC ARIA REFERENCE POINT DOSAGE GIVEN TO DATE: 13.3 GY
RAD ONC ARIA REFERENCE POINT DOSAGE GIVEN TO DATE: 15.96 GY
RAD ONC ARIA REFERENCE POINT ID: NORMAL
RAD ONC ARIA REFERENCE POINT ID: NORMAL
RAD ONC ARIA REFERENCE POINT SESSION DOSAGE GIVEN: 2.66 GY
RAD ONC ARIA REFERENCE POINT SESSION DOSAGE GIVEN: 2.66 GY

## 2023-08-01 PROCEDURE — 77427 RADIATION TX MANAGEMENT X5: CPT | Performed by: RADIOLOGY

## 2023-08-01 PROCEDURE — 77417 THER RADIOLOGY PORT IMAGE(S): CPT | Performed by: RADIOLOGY

## 2023-08-01 PROCEDURE — 77412 RADIATION TX DELIVERY LVL 3: CPT | Performed by: RADIOLOGY

## 2023-08-02 ENCOUNTER — DOCUMENTATION (OUTPATIENT)
Dept: OTHER | Facility: HOSPITAL | Age: 63
End: 2023-08-02
Payer: COMMERCIAL

## 2023-08-02 ENCOUNTER — HOSPITAL ENCOUNTER (OUTPATIENT)
Dept: RADIATION ONCOLOGY | Facility: HOSPITAL | Age: 63
Discharge: HOME | End: 2023-08-02
Payer: COMMERCIAL

## 2023-08-02 LAB
RAD ONC ARIA COURSE ID: NORMAL
RAD ONC ARIA COURSE INTENT: NORMAL
RAD ONC ARIA COURSE LAST TREATMENT DATE: NORMAL
RAD ONC ARIA COURSE START DATE: NORMAL
RAD ONC ARIA COURSE TREATMENT ELAPSED DAYS: 8
RAD ONC ARIA FIRST TREATMENT DATE: NORMAL
RAD ONC ARIA PLAN FRACTIONS TREATED TO DATE: 7
RAD ONC ARIA PLAN ID: NORMAL
RAD ONC ARIA PLAN PRESCRIBED DOSE PER FRACTION: 2.66 GY
RAD ONC ARIA PLAN PRIMARY REFERENCE POINT: NORMAL
RAD ONC ARIA PLAN TOTAL FRACTIONS PRESCRIBED: 16
RAD ONC ARIA PLAN TOTAL PRESCRIBED DOSE: 4256 CGY
RAD ONC ARIA REFERENCE POINT DOSAGE GIVEN TO DATE: 18.62 GY
RAD ONC ARIA REFERENCE POINT ID: NORMAL
RAD ONC ARIA REFERENCE POINT SESSION DOSAGE GIVEN: 2.66 GY

## 2023-08-02 PROCEDURE — 77412 RADIATION TX DELIVERY LVL 3: CPT | Performed by: RADIOLOGY

## 2023-08-03 ENCOUNTER — HOSPITAL ENCOUNTER (OUTPATIENT)
Dept: RADIATION ONCOLOGY | Facility: HOSPITAL | Age: 63
Discharge: HOME | End: 2023-08-03
Payer: COMMERCIAL

## 2023-08-03 LAB
RAD ONC ARIA COURSE ID: NORMAL
RAD ONC ARIA COURSE INTENT: NORMAL
RAD ONC ARIA COURSE LAST TREATMENT DATE: NORMAL
RAD ONC ARIA COURSE START DATE: NORMAL
RAD ONC ARIA COURSE TREATMENT ELAPSED DAYS: 9
RAD ONC ARIA FIRST TREATMENT DATE: NORMAL
RAD ONC ARIA PLAN FRACTIONS TREATED TO DATE: 8
RAD ONC ARIA PLAN ID: NORMAL
RAD ONC ARIA PLAN PRESCRIBED DOSE PER FRACTION: 2.66 GY
RAD ONC ARIA PLAN PRIMARY REFERENCE POINT: NORMAL
RAD ONC ARIA PLAN TOTAL FRACTIONS PRESCRIBED: 16
RAD ONC ARIA PLAN TOTAL PRESCRIBED DOSE: 4256 CGY
RAD ONC ARIA REFERENCE POINT DOSAGE GIVEN TO DATE: 21.28 GY
RAD ONC ARIA REFERENCE POINT ID: NORMAL
RAD ONC ARIA REFERENCE POINT SESSION DOSAGE GIVEN: 2.66 GY

## 2023-08-03 PROCEDURE — 77336 RADIATION PHYSICS CONSULT: CPT | Performed by: RADIOLOGY

## 2023-08-03 PROCEDURE — 77412 RADIATION TX DELIVERY LVL 3: CPT | Performed by: RADIOLOGY

## 2023-08-04 ENCOUNTER — HOSPITAL ENCOUNTER (OUTPATIENT)
Dept: RADIATION ONCOLOGY | Facility: HOSPITAL | Age: 63
Discharge: HOME | End: 2023-08-04
Payer: COMMERCIAL

## 2023-08-04 DIAGNOSIS — F43.23 ADJUSTMENT DISORDER WITH MIXED ANXIETY AND DEPRESSED MOOD: ICD-10-CM

## 2023-08-04 LAB
RAD ONC ARIA COURSE ID: NORMAL
RAD ONC ARIA COURSE INTENT: NORMAL
RAD ONC ARIA COURSE LAST TREATMENT DATE: NORMAL
RAD ONC ARIA COURSE START DATE: NORMAL
RAD ONC ARIA COURSE TREATMENT ELAPSED DAYS: 10
RAD ONC ARIA FIRST TREATMENT DATE: NORMAL
RAD ONC ARIA PLAN FRACTIONS TREATED TO DATE: 9
RAD ONC ARIA PLAN ID: NORMAL
RAD ONC ARIA PLAN PRESCRIBED DOSE PER FRACTION: 2.66 GY
RAD ONC ARIA PLAN PRIMARY REFERENCE POINT: NORMAL
RAD ONC ARIA PLAN TOTAL FRACTIONS PRESCRIBED: 16
RAD ONC ARIA PLAN TOTAL PRESCRIBED DOSE: 4256 CGY
RAD ONC ARIA REFERENCE POINT DOSAGE GIVEN TO DATE: 23.94 GY
RAD ONC ARIA REFERENCE POINT ID: NORMAL
RAD ONC ARIA REFERENCE POINT SESSION DOSAGE GIVEN: 2.66 GY

## 2023-08-04 PROCEDURE — 77412 RADIATION TX DELIVERY LVL 3: CPT | Performed by: RADIOLOGY

## 2023-08-04 RX ORDER — GABAPENTIN 100 MG/1
100 CAPSULE ORAL 3 TIMES DAILY
Qty: 90 CAPSULE | Refills: 0 | Status: SHIPPED | OUTPATIENT
Start: 2023-08-04 | End: 2024-08-03

## 2023-08-07 ENCOUNTER — PATIENT OUTREACH (OUTPATIENT)
Dept: OTHER | Facility: HOSPITAL | Age: 63
End: 2023-08-07
Payer: COMMERCIAL

## 2023-08-07 ENCOUNTER — HOSPITAL ENCOUNTER (OUTPATIENT)
Dept: RADIATION ONCOLOGY | Facility: HOSPITAL | Age: 63
Discharge: HOME | End: 2023-08-07
Payer: COMMERCIAL

## 2023-08-07 LAB
RAD ONC ARIA COURSE ID: NORMAL
RAD ONC ARIA COURSE INTENT: NORMAL
RAD ONC ARIA COURSE LAST TREATMENT DATE: NORMAL
RAD ONC ARIA COURSE START DATE: NORMAL
RAD ONC ARIA COURSE TREATMENT ELAPSED DAYS: 13
RAD ONC ARIA FIRST TREATMENT DATE: NORMAL
RAD ONC ARIA PLAN FRACTIONS TREATED TO DATE: 10
RAD ONC ARIA PLAN ID: NORMAL
RAD ONC ARIA PLAN PRESCRIBED DOSE PER FRACTION: 2.66 GY
RAD ONC ARIA PLAN PRIMARY REFERENCE POINT: NORMAL
RAD ONC ARIA PLAN TOTAL FRACTIONS PRESCRIBED: 16
RAD ONC ARIA PLAN TOTAL PRESCRIBED DOSE: 4256 CGY
RAD ONC ARIA REFERENCE POINT DOSAGE GIVEN TO DATE: 26.6 GY
RAD ONC ARIA REFERENCE POINT ID: NORMAL
RAD ONC ARIA REFERENCE POINT SESSION DOSAGE GIVEN: 2.66 GY

## 2023-08-07 PROCEDURE — 77412 RADIATION TX DELIVERY LVL 3: CPT | Performed by: RADIOLOGY

## 2023-08-07 PROCEDURE — 77014: CPT | Performed by: RADIOLOGY

## 2023-08-07 NOTE — PROGRESS NOTES
Called Ms. Dobbins to see how she was doing. Left a message with my contact information and asked her to call back at her convenience.

## 2023-08-08 ENCOUNTER — HOSPITAL ENCOUNTER (OUTPATIENT)
Dept: RADIATION ONCOLOGY | Facility: HOSPITAL | Age: 63
Discharge: HOME | End: 2023-08-08
Payer: COMMERCIAL

## 2023-08-08 ENCOUNTER — RADIATION ONCOLOGY WEEKLY ASSESSMENT (OUTPATIENT)
Dept: RADIATION ONCOLOGY | Facility: HOSPITAL | Age: 63
End: 2023-08-08
Payer: COMMERCIAL

## 2023-08-08 VITALS
WEIGHT: 160.4 LBS | DIASTOLIC BLOOD PRESSURE: 71 MMHG | SYSTOLIC BLOOD PRESSURE: 112 MMHG | BODY MASS INDEX: 29.33 KG/M2 | HEART RATE: 71 BPM | OXYGEN SATURATION: 99 %

## 2023-08-08 DIAGNOSIS — C50.911 DUCTAL CARCINOMA IN SITU (DCIS) OF RIGHT BREAST WITH MICROINVASIVE COMPONENT: Primary | ICD-10-CM

## 2023-08-08 LAB
RAD ONC ARIA COURSE ID: NORMAL
RAD ONC ARIA COURSE INTENT: NORMAL
RAD ONC ARIA COURSE LAST TREATMENT DATE: NORMAL
RAD ONC ARIA COURSE START DATE: NORMAL
RAD ONC ARIA COURSE TREATMENT ELAPSED DAYS: 14
RAD ONC ARIA FIRST TREATMENT DATE: NORMAL
RAD ONC ARIA PLAN FRACTIONS TREATED TO DATE: 11
RAD ONC ARIA PLAN ID: NORMAL
RAD ONC ARIA PLAN PRESCRIBED DOSE PER FRACTION: 2.66 GY
RAD ONC ARIA PLAN PRIMARY REFERENCE POINT: NORMAL
RAD ONC ARIA PLAN TOTAL FRACTIONS PRESCRIBED: 16
RAD ONC ARIA PLAN TOTAL PRESCRIBED DOSE: 4256 CGY
RAD ONC ARIA REFERENCE POINT DOSAGE GIVEN TO DATE: 29.26 GY
RAD ONC ARIA REFERENCE POINT ID: NORMAL
RAD ONC ARIA REFERENCE POINT SESSION DOSAGE GIVEN: 2.66 GY

## 2023-08-08 PROCEDURE — 77334 RADIATION TREATMENT AID(S): CPT | Performed by: RADIOLOGY

## 2023-08-08 PROCEDURE — 77412 RADIATION TX DELIVERY LVL 3: CPT | Performed by: RADIOLOGY

## 2023-08-08 PROCEDURE — 77417 THER RADIOLOGY PORT IMAGE(S): CPT | Performed by: RADIOLOGY

## 2023-08-08 PROCEDURE — 77300 RADIATION THERAPY DOSE PLAN: CPT | Performed by: RADIOLOGY

## 2023-08-08 PROCEDURE — 77427 RADIATION TX MANAGEMENT X5: CPT | Performed by: RADIOLOGY

## 2023-08-08 PROCEDURE — 77295 3-D RADIOTHERAPY PLAN: CPT | Performed by: RADIOLOGY

## 2023-08-08 NOTE — PROGRESS NOTES
Physician Weekly Management Note    Diagnosis:     Diagnosis Plan   1. 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   Yes  New medications given?   No  Problemlist reviewed?   Yes  Problem added?   No  Issues raised requiring referral to support services - task assigned to:  na    Technical aspects reviewed:  Weekly OBI approved?   Yes  Weekly port films approved?   Yes  Change requests noted on port film?   No  Patient setup and plan reviewed?   Yes    Chart Reviewed:  Continue current treatment plan?   Yes  Treatment plan change requested?   No  CBC reviewed?   No  Concurrent Chemo?   No    Objective     Toxicities:   none     Review of Systems   Constitutional: Negative.    Skin: Negative.         There were no vitals filed for this visit.        7/25/2023    12:09 PM   Current Status   ECOG score 0       Physical Exam  Constitutional:       Appearance: Normal appearance.   Chest:          Comments: Minimal eryhtema  Neurological:      Mental Status: She is alert.           Problem Summary List    Diagnosis:     Diagnosis Plan   1. 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     Migraines          Past Surgical History:   Procedure Laterality Date    BLEPHAROPLASTY Left     LOWER    BREAST LUMPECTOMY WITH SENTINEL NODE BIOPSY Right 6/19/2023    Procedure: Right needle-localized, bracketed, partial mastectomy and sentinel lymph node biopsy;  Surgeon: Kristin Mojica MD;  Location: [facility];  Service: General;  Laterality: Right;    KIDNEY STONE SURGERY   2000    KNEE MENISCAL REPAIR           Current Outpatient Medications on File Prior to Visit   Medication Sig Dispense Refill    cetirizine (zyrTEC) 10 MG tablet Take 1 tablet by mouth Daily As Needed for Allergies.      Cholecalciferol (Vitamin D3) 25 MCG (1000 UT) capsule       fexofenadine-pseudoephedrine (ALLEGRA-D 24) 180-240 MG per 24 hr tablet Take 1 tablet by mouth Daily. 90 tablet 1    gabapentin (Neurontin) 100 MG capsule Take 1 capsule by mouth 3 (Three) Times a Day. 90 capsule 0    MAGNESIUM PO Take  by mouth. HOLD FOR SURGERY      Multiple Vitamin (MULTI VITAMIN) tablet Take  by mouth. HOLD FOR SURGERY      SUMAtriptan-naproxen (TREXIMET)  MG per tablet TAKE ONE TABLET BY MOUTH AT ONSET OF HEADACHE. MAY REPEAT AFTER TWO HOURS. MAX TWO DOSES DAILY (Patient taking differently: TAKE ONE TABLET BY MOUTH AT ONSET OF HEADACHE. MAY REPEAT AFTER TWO HOURS. MAX TWO DOSES DAILY  HOLD PER MD INSTRUCTIONS PRIOR TO SURGERY) 9 tablet 11     No current facility-administered medications on file prior to visit.       Allergies   Allergen Reactions    Moxifloxacin Hives         Referring Provider:    Kristin Mojica Md  [address] [place] [address]    Oncologist:  No primary care provider on file.      Seen and approved by:  Shyann Thomson MD  08/08/2023

## 2023-08-09 ENCOUNTER — HOSPITAL ENCOUNTER (OUTPATIENT)
Dept: RADIATION ONCOLOGY | Facility: HOSPITAL | Age: 63
Discharge: HOME | End: 2023-08-09
Payer: COMMERCIAL

## 2023-08-09 LAB
RAD ONC ARIA COURSE ID: NORMAL
RAD ONC ARIA COURSE INTENT: NORMAL
RAD ONC ARIA COURSE LAST TREATMENT DATE: NORMAL
RAD ONC ARIA COURSE START DATE: NORMAL
RAD ONC ARIA COURSE TREATMENT ELAPSED DAYS: 15
RAD ONC ARIA FIRST TREATMENT DATE: NORMAL
RAD ONC ARIA PLAN FRACTIONS TREATED TO DATE: 12
RAD ONC ARIA PLAN ID: NORMAL
RAD ONC ARIA PLAN PRESCRIBED DOSE PER FRACTION: 2.66 GY
RAD ONC ARIA PLAN PRIMARY REFERENCE POINT: NORMAL
RAD ONC ARIA PLAN TOTAL FRACTIONS PRESCRIBED: 16
RAD ONC ARIA PLAN TOTAL PRESCRIBED DOSE: 4256 CGY
RAD ONC ARIA REFERENCE POINT DOSAGE GIVEN TO DATE: 31.92 GY
RAD ONC ARIA REFERENCE POINT ID: NORMAL
RAD ONC ARIA REFERENCE POINT SESSION DOSAGE GIVEN: 2.66 GY

## 2023-08-09 PROCEDURE — 77412 RADIATION TX DELIVERY LVL 3: CPT | Performed by: RADIOLOGY

## 2023-08-10 ENCOUNTER — HOSPITAL ENCOUNTER (OUTPATIENT)
Dept: RADIATION ONCOLOGY | Facility: HOSPITAL | Age: 63
Discharge: HOME | End: 2023-08-10
Payer: COMMERCIAL

## 2023-08-10 LAB
RAD ONC ARIA COURSE ID: NORMAL
RAD ONC ARIA COURSE INTENT: NORMAL
RAD ONC ARIA COURSE LAST TREATMENT DATE: NORMAL
RAD ONC ARIA COURSE START DATE: NORMAL
RAD ONC ARIA COURSE TREATMENT ELAPSED DAYS: 16
RAD ONC ARIA FIRST TREATMENT DATE: NORMAL
RAD ONC ARIA PLAN FRACTIONS TREATED TO DATE: 13
RAD ONC ARIA PLAN ID: NORMAL
RAD ONC ARIA PLAN PRESCRIBED DOSE PER FRACTION: 2.66 GY
RAD ONC ARIA PLAN PRIMARY REFERENCE POINT: NORMAL
RAD ONC ARIA PLAN TOTAL FRACTIONS PRESCRIBED: 16
RAD ONC ARIA PLAN TOTAL PRESCRIBED DOSE: 4256 CGY
RAD ONC ARIA REFERENCE POINT DOSAGE GIVEN TO DATE: 34.58 GY
RAD ONC ARIA REFERENCE POINT ID: NORMAL
RAD ONC ARIA REFERENCE POINT SESSION DOSAGE GIVEN: 2.66 GY

## 2023-08-10 PROCEDURE — 77336 RADIATION PHYSICS CONSULT: CPT | Performed by: RADIOLOGY

## 2023-08-10 PROCEDURE — 77412 RADIATION TX DELIVERY LVL 3: CPT | Performed by: RADIOLOGY

## 2023-08-10 PROCEDURE — G6002 STEREOSCOPIC X-RAY GUIDANCE: HCPCS | Performed by: RADIOLOGY

## 2023-08-11 ENCOUNTER — HOSPITAL ENCOUNTER (OUTPATIENT)
Dept: RADIATION ONCOLOGY | Facility: HOSPITAL | Age: 63
Discharge: HOME | End: 2023-08-11
Payer: COMMERCIAL

## 2023-08-11 LAB
RAD ONC ARIA COURSE ID: NORMAL
RAD ONC ARIA COURSE INTENT: NORMAL
RAD ONC ARIA COURSE LAST TREATMENT DATE: NORMAL
RAD ONC ARIA COURSE START DATE: NORMAL
RAD ONC ARIA COURSE TREATMENT ELAPSED DAYS: 17
RAD ONC ARIA FIRST TREATMENT DATE: NORMAL
RAD ONC ARIA PLAN FRACTIONS TREATED TO DATE: 14
RAD ONC ARIA PLAN ID: NORMAL
RAD ONC ARIA PLAN PRESCRIBED DOSE PER FRACTION: 2.66 GY
RAD ONC ARIA PLAN PRIMARY REFERENCE POINT: NORMAL
RAD ONC ARIA PLAN TOTAL FRACTIONS PRESCRIBED: 16
RAD ONC ARIA PLAN TOTAL PRESCRIBED DOSE: 4256 CGY
RAD ONC ARIA REFERENCE POINT DOSAGE GIVEN TO DATE: 37.24 GY
RAD ONC ARIA REFERENCE POINT ID: NORMAL
RAD ONC ARIA REFERENCE POINT SESSION DOSAGE GIVEN: 2.66 GY

## 2023-08-11 PROCEDURE — 77412 RADIATION TX DELIVERY LVL 3: CPT | Performed by: RADIOLOGY

## 2023-08-14 ENCOUNTER — HOSPITAL ENCOUNTER (OUTPATIENT)
Dept: RADIATION ONCOLOGY | Facility: HOSPITAL | Age: 63
Discharge: HOME | End: 2023-08-14
Payer: COMMERCIAL

## 2023-08-14 LAB
RAD ONC ARIA COURSE ID: NORMAL
RAD ONC ARIA COURSE INTENT: NORMAL
RAD ONC ARIA COURSE LAST TREATMENT DATE: NORMAL
RAD ONC ARIA COURSE START DATE: NORMAL
RAD ONC ARIA COURSE TREATMENT ELAPSED DAYS: 20
RAD ONC ARIA FIRST TREATMENT DATE: NORMAL
RAD ONC ARIA PLAN FRACTIONS TREATED TO DATE: 15
RAD ONC ARIA PLAN ID: NORMAL
RAD ONC ARIA PLAN PRESCRIBED DOSE PER FRACTION: 2.66 GY
RAD ONC ARIA PLAN PRIMARY REFERENCE POINT: NORMAL
RAD ONC ARIA PLAN TOTAL FRACTIONS PRESCRIBED: 16
RAD ONC ARIA PLAN TOTAL PRESCRIBED DOSE: 4256 CGY
RAD ONC ARIA REFERENCE POINT DOSAGE GIVEN TO DATE: 39.9 GY
RAD ONC ARIA REFERENCE POINT ID: NORMAL
RAD ONC ARIA REFERENCE POINT SESSION DOSAGE GIVEN: 2.66 GY

## 2023-08-14 PROCEDURE — 77412 RADIATION TX DELIVERY LVL 3: CPT | Performed by: INTERNAL MEDICINE

## 2023-08-15 ENCOUNTER — RADIATION ONCOLOGY WEEKLY ASSESSMENT (OUTPATIENT)
Dept: RADIATION ONCOLOGY | Facility: HOSPITAL | Age: 63
End: 2023-08-15
Payer: COMMERCIAL

## 2023-08-15 ENCOUNTER — HOSPITAL ENCOUNTER (OUTPATIENT)
Dept: RADIATION ONCOLOGY | Facility: HOSPITAL | Age: 63
Discharge: HOME | End: 2023-08-15
Payer: COMMERCIAL

## 2023-08-15 VITALS
SYSTOLIC BLOOD PRESSURE: 131 MMHG | WEIGHT: 161.2 LBS | OXYGEN SATURATION: 98 % | DIASTOLIC BLOOD PRESSURE: 81 MMHG | BODY MASS INDEX: 29.48 KG/M2 | HEART RATE: 74 BPM

## 2023-08-15 DIAGNOSIS — C50.911 DUCTAL CARCINOMA IN SITU (DCIS) OF RIGHT BREAST WITH MICROINVASIVE COMPONENT: Primary | ICD-10-CM

## 2023-08-15 LAB
RAD ONC ARIA COURSE ID: NORMAL
RAD ONC ARIA COURSE INTENT: NORMAL
RAD ONC ARIA COURSE LAST TREATMENT DATE: NORMAL
RAD ONC ARIA COURSE START DATE: NORMAL
RAD ONC ARIA COURSE TREATMENT ELAPSED DAYS: 21
RAD ONC ARIA FIRST TREATMENT DATE: NORMAL
RAD ONC ARIA PLAN FRACTIONS TREATED TO DATE: 16
RAD ONC ARIA PLAN ID: NORMAL
RAD ONC ARIA PLAN PRESCRIBED DOSE PER FRACTION: 2.66 GY
RAD ONC ARIA PLAN PRIMARY REFERENCE POINT: NORMAL
RAD ONC ARIA PLAN TOTAL FRACTIONS PRESCRIBED: 16
RAD ONC ARIA PLAN TOTAL PRESCRIBED DOSE: 4256 CGY
RAD ONC ARIA REFERENCE POINT DOSAGE GIVEN TO DATE: 42.56 GY
RAD ONC ARIA REFERENCE POINT ID: NORMAL
RAD ONC ARIA REFERENCE POINT SESSION DOSAGE GIVEN: 2.66 GY

## 2023-08-15 PROCEDURE — 77412 RADIATION TX DELIVERY LVL 3: CPT | Performed by: RADIOLOGY

## 2023-08-15 PROCEDURE — 77427 RADIATION TX MANAGEMENT X5: CPT | Performed by: RADIOLOGY

## 2023-08-15 PROCEDURE — 77417 THER RADIOLOGY PORT IMAGE(S): CPT | Performed by: RADIOLOGY

## 2023-08-15 NOTE — PROGRESS NOTES
Physician Weekly Management Note    Diagnosis:     Diagnosis Plan   1. 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   Yes  New medications given?   No  Problemlist reviewed?   Yes  Problem added?   No  Issues raised requiring referral to support services - task assigned to:  na    Technical aspects reviewed:  Weekly OBI approved?   Yes  Weekly port films approved?   Yes  Change requests noted on port film?   No  Patient setup and plan reviewed?   Yes    Chart Reviewed:  Continue current treatment plan?   Yes  Treatment plan change requested?   No  CBC reviewed?   No  Concurrent Chemo?   No    Objective     Toxicities:   none     Review of Systems   Constitutional:  Positive for fatigue.   Skin:  Positive for color change.        There were no vitals filed for this visit.        7/25/2023    12:09 PM   Current Status   ECOG score 0       Physical Exam  Constitutional:       Appearance: Normal appearance.   Chest:          Comments: Mild erythema,   Neurological:      Mental Status: She is alert.           Problem Summary List    Diagnosis:     Diagnosis Plan   1. 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     Migraines          Past Surgical History:   Procedure Laterality Date    BLEPHAROPLASTY Left     LOWER    BREAST LUMPECTOMY WITH SENTINEL NODE BIOPSY Right 6/19/2023    Procedure: Right needle-localized, bracketed, partial mastectomy and sentinel lymph node biopsy;  Surgeon: Kristin Mojica MD;  Location: [facility];  Service: General;   Laterality: Right;    KIDNEY STONE SURGERY  2000    KNEE MENISCAL REPAIR           Current Outpatient Medications on File Prior to Visit   Medication Sig Dispense Refill    cetirizine (zyrTEC) 10 MG tablet Take 1 tablet by mouth Daily As Needed for Allergies.      Cholecalciferol (Vitamin D3) 25 MCG (1000 UT) capsule       fexofenadine-pseudoephedrine (ALLEGRA-D 24) 180-240 MG per 24 hr tablet Take 1 tablet by mouth Daily. 90 tablet 1    gabapentin (Neurontin) 100 MG capsule Take 1 capsule by mouth 3 (Three) Times a Day. 90 capsule 0    MAGNESIUM PO Take  by mouth. HOLD FOR SURGERY      Multiple Vitamin (MULTI VITAMIN) tablet Take  by mouth. HOLD FOR SURGERY      SUMAtriptan-naproxen (TREXIMET)  MG per tablet TAKE ONE TABLET BY MOUTH AT ONSET OF HEADACHE. MAY REPEAT AFTER TWO HOURS. MAX TWO DOSES DAILY (Patient taking differently: TAKE ONE TABLET BY MOUTH AT ONSET OF HEADACHE. MAY REPEAT AFTER TWO HOURS. MAX TWO DOSES DAILY  HOLD PER MD INSTRUCTIONS PRIOR TO SURGERY) 9 tablet 11     No current facility-administered medications on file prior to visit.       Allergies   Allergen Reactions    Moxifloxacin Hives         Referring Provider:    Kristin Mojica Md  [address] [place] [id number]    Oncologist:  No primary care provider on file.      Seen and approved by:  Shyann Thomson MD  08/15/2023  Subjective

## 2023-08-16 ENCOUNTER — HOSPITAL ENCOUNTER (OUTPATIENT)
Dept: RADIATION ONCOLOGY | Facility: HOSPITAL | Age: 63
Discharge: HOME | End: 2023-08-16

## 2023-08-16 LAB
RAD ONC ARIA COURSE ID: NORMAL
RAD ONC ARIA COURSE INTENT: NORMAL
RAD ONC ARIA COURSE LAST TREATMENT DATE: NORMAL
RAD ONC ARIA COURSE START DATE: NORMAL
RAD ONC ARIA COURSE TREATMENT ELAPSED DAYS: 22
RAD ONC ARIA FIRST TREATMENT DATE: NORMAL
RAD ONC ARIA PLAN FRACTIONS TREATED TO DATE: 1
RAD ONC ARIA PLAN ID: NORMAL
RAD ONC ARIA PLAN PRESCRIBED DOSE PER FRACTION: 2 GY
RAD ONC ARIA PLAN PRIMARY REFERENCE POINT: NORMAL
RAD ONC ARIA PLAN TOTAL FRACTIONS PRESCRIBED: 4
RAD ONC ARIA PLAN TOTAL PRESCRIBED DOSE: 800 CGY
RAD ONC ARIA REFERENCE POINT DOSAGE GIVEN TO DATE: 2 GY
RAD ONC ARIA REFERENCE POINT ID: NORMAL
RAD ONC ARIA REFERENCE POINT SESSION DOSAGE GIVEN: 2 GY

## 2023-08-16 PROCEDURE — 77412 RADIATION TX DELIVERY LVL 3: CPT | Performed by: RADIOLOGY

## 2023-08-16 PROCEDURE — 77280 THER RAD SIMULAJ FIELD SMPL: CPT | Performed by: RADIOLOGY

## 2023-08-17 ENCOUNTER — HOSPITAL ENCOUNTER (OUTPATIENT)
Dept: RADIATION ONCOLOGY | Facility: HOSPITAL | Age: 63
Discharge: HOME | End: 2023-08-17
Payer: COMMERCIAL

## 2023-08-17 LAB
RAD ONC ARIA COURSE ID: NORMAL
RAD ONC ARIA COURSE INTENT: NORMAL
RAD ONC ARIA COURSE LAST TREATMENT DATE: NORMAL
RAD ONC ARIA COURSE START DATE: NORMAL
RAD ONC ARIA COURSE TREATMENT ELAPSED DAYS: 23
RAD ONC ARIA FIRST TREATMENT DATE: NORMAL
RAD ONC ARIA PLAN FRACTIONS TREATED TO DATE: 2
RAD ONC ARIA PLAN ID: NORMAL
RAD ONC ARIA PLAN PRESCRIBED DOSE PER FRACTION: 2 GY
RAD ONC ARIA PLAN PRIMARY REFERENCE POINT: NORMAL
RAD ONC ARIA PLAN TOTAL FRACTIONS PRESCRIBED: 4
RAD ONC ARIA PLAN TOTAL PRESCRIBED DOSE: 800 CGY
RAD ONC ARIA REFERENCE POINT DOSAGE GIVEN TO DATE: 4 GY
RAD ONC ARIA REFERENCE POINT ID: NORMAL
RAD ONC ARIA REFERENCE POINT SESSION DOSAGE GIVEN: 2 GY

## 2023-08-17 PROCEDURE — 77412 RADIATION TX DELIVERY LVL 3: CPT | Performed by: RADIOLOGY

## 2023-08-17 PROCEDURE — 77336 RADIATION PHYSICS CONSULT: CPT | Performed by: RADIOLOGY

## 2023-08-18 LAB
RAD ONC ARIA COURSE ID: NORMAL
RAD ONC ARIA COURSE INTENT: NORMAL
RAD ONC ARIA COURSE LAST TREATMENT DATE: NORMAL
RAD ONC ARIA COURSE START DATE: NORMAL
RAD ONC ARIA COURSE TREATMENT ELAPSED DAYS: 24
RAD ONC ARIA FIRST TREATMENT DATE: NORMAL
RAD ONC ARIA PLAN FRACTIONS TREATED TO DATE: 3
RAD ONC ARIA PLAN ID: NORMAL
RAD ONC ARIA PLAN PRESCRIBED DOSE PER FRACTION: 2 GY
RAD ONC ARIA PLAN PRIMARY REFERENCE POINT: NORMAL
RAD ONC ARIA PLAN TOTAL FRACTIONS PRESCRIBED: 4
RAD ONC ARIA PLAN TOTAL PRESCRIBED DOSE: 800 CGY
RAD ONC ARIA REFERENCE POINT DOSAGE GIVEN TO DATE: 6 GY
RAD ONC ARIA REFERENCE POINT ID: NORMAL
RAD ONC ARIA REFERENCE POINT SESSION DOSAGE GIVEN: 2 GY

## 2023-08-18 PROCEDURE — 77412 RADIATION TX DELIVERY LVL 3: CPT | Performed by: RADIOLOGY

## 2023-08-21 ENCOUNTER — HOSPITAL ENCOUNTER (OUTPATIENT)
Dept: RADIATION ONCOLOGY | Facility: HOSPITAL | Age: 63
Discharge: HOME | End: 2023-08-21
Payer: COMMERCIAL

## 2023-08-21 DIAGNOSIS — C50.911 DUCTAL CARCINOMA IN SITU (DCIS) OF RIGHT BREAST WITH MICROINVASIVE COMPONENT: Primary | ICD-10-CM

## 2023-08-21 LAB
RAD ONC ARIA COURSE END DATE: NORMAL
RAD ONC ARIA COURSE ID: NORMAL
RAD ONC ARIA COURSE ID: NORMAL
RAD ONC ARIA COURSE INTENT: NORMAL
RAD ONC ARIA COURSE INTENT: NORMAL
RAD ONC ARIA COURSE LAST TREATMENT DATE: NORMAL
RAD ONC ARIA COURSE LAST TREATMENT DATE: NORMAL
RAD ONC ARIA COURSE START DATE: NORMAL
RAD ONC ARIA COURSE START DATE: NORMAL
RAD ONC ARIA COURSE TREATMENT ELAPSED DAYS: 27
RAD ONC ARIA COURSE TREATMENT ELAPSED DAYS: 27
RAD ONC ARIA FIRST TREATMENT DATE: NORMAL
RAD ONC ARIA FIRST TREATMENT DATE: NORMAL
RAD ONC ARIA PLAN FRACTIONS TREATED TO DATE: 16
RAD ONC ARIA PLAN FRACTIONS TREATED TO DATE: 4
RAD ONC ARIA PLAN FRACTIONS TREATED TO DATE: 4
RAD ONC ARIA PLAN ID: NORMAL
RAD ONC ARIA PLAN NAME: NORMAL
RAD ONC ARIA PLAN NAME: NORMAL
RAD ONC ARIA PLAN PRESCRIBED DOSE PER FRACTION: 2 GY
RAD ONC ARIA PLAN PRESCRIBED DOSE PER FRACTION: 2 GY
RAD ONC ARIA PLAN PRESCRIBED DOSE PER FRACTION: 2.66 GY
RAD ONC ARIA PLAN PRIMARY REFERENCE POINT: NORMAL
RAD ONC ARIA PLAN TOTAL FRACTIONS PRESCRIBED: 16
RAD ONC ARIA PLAN TOTAL FRACTIONS PRESCRIBED: 4
RAD ONC ARIA PLAN TOTAL FRACTIONS PRESCRIBED: 4
RAD ONC ARIA PLAN TOTAL PRESCRIBED DOSE: 4256 CGY
RAD ONC ARIA PLAN TOTAL PRESCRIBED DOSE: 800 CGY
RAD ONC ARIA PLAN TOTAL PRESCRIBED DOSE: 800 CGY
RAD ONC ARIA REFERENCE POINT DOSAGE GIVEN TO DATE: 42.56 GY
RAD ONC ARIA REFERENCE POINT DOSAGE GIVEN TO DATE: 8 GY
RAD ONC ARIA REFERENCE POINT DOSAGE GIVEN TO DATE: 8 GY
RAD ONC ARIA REFERENCE POINT ID: NORMAL
RAD ONC ARIA REFERENCE POINT SESSION DOSAGE GIVEN: 2 GY

## 2023-08-21 PROCEDURE — 77412 RADIATION TX DELIVERY LVL 3: CPT | Performed by: RADIOLOGY

## 2023-08-30 ENCOUNTER — HOSPITAL ENCOUNTER (OUTPATIENT)
Dept: PHYSICAL THERAPY | Facility: HOSPITAL | Age: 63
Setting detail: THERAPIES SERIES
Discharge: HOME | End: 2023-08-30
Payer: COMMERCIAL

## 2023-08-30 DIAGNOSIS — Z98.890 S/P LUMPECTOMY, RIGHT BREAST: ICD-10-CM

## 2023-08-30 DIAGNOSIS — Z91.89 AT RISK FOR LYMPHEDEMA: Primary | ICD-10-CM

## 2023-08-30 DIAGNOSIS — C50.911 DUCTAL CARCINOMA IN SITU (DCIS) OF RIGHT BREAST WITH MICROINVASIVE COMPONENT: ICD-10-CM

## 2023-08-30 PROCEDURE — 97535 SELF CARE MNGMENT TRAINING: CPT

## 2023-08-30 PROCEDURE — 97110 THERAPEUTIC EXERCISES: CPT

## 2023-08-30 PROCEDURE — 93702 BIS XTRACELL FLUID ANALYSIS: CPT

## 2023-08-30 NOTE — THERAPY RE-EVALUATION
Physical Therapy Lymphedema Re-Evaluation  [facility]     Patient Name: Gaby Dobbins  : 1960  MRN: [id number]  Today's Date: 2023      Visit Date: 2023    Visit Dx:    ICD-10-CM ICD-9-CM   1. At risk for lymphedema  Z91.89 V49.89   2. 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 MAIN OR;  Service: General;  Laterality: Right;    KIDNEY STONE SURGERY      KNEE MENISCAL REPAIR         Visit Dx:    ICD-10-CM ICD-9-CM   1. At risk for lymphedema  Z91.89 V49.89   2. 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 S/P lumpectomy, right breast  Z98.890 V45.89            Lymphedema       Row Name 23 1400             Subjective Pain    Able to rate subjective pain? yes  -LB      Pre-Treatment Pain Level 2  -LB         Subjective Comments    Subjective Comments I am doing ok. I am still healing from radiation. I also had a sciatic flare up from the way I was angled on the table for 3 days of radiation.  -LB         Lymphedema Assessment    Lymphedema Classification RUE:;at risk/stage 0  -LB      Lymphedema Cancer  Related Sx right;lumpectomy;sentinel node biopsy  -LB      Lymphedema Surgery Comments 6/19/23  -LB      Lymph Nodes Removed # 2  -LB      Positive Lymph Nodes # 0  -LB      Chemo Received no  -LB      Radiation Therapy Received yes  -LB      Radiation Treatments #/Timeframe completed  -LB      Infections or Cellulitis? no  -LB         Posture/Observations    Posture/Observations Comments WNL; slightly forward head, L shoulder elevated  -LB         General ROM    GENERAL ROM COMMENTS BUE WFL  -LB         MMT (Manual Muscle Testing)    General MMT Comments BUE WFL  -LB         Lymphedema Edema Assessment    Edema Assessment Comment no obvious edema; obvious inc UT tightness R with tendency to overactivate with any UE movement  -LB         L-Dex Bioimpedence Screening    L-Dex Measurement Extremity RUE  -LB      L-Dex Patient Position Standing  -LB      L-Dex UE Dominate Side Right  -LB      L-Dex UE At Risk Side Right  -LB      L-Dex UE Pre Surgical Value No  -LB      L-Dex UE Score 2.7  -LB      L-Dex UE Baseline Score 0.9  -LB      L-Dex UE Value Change 1.8  -LB      L-Dex UE Comment WNL  -LB      $ L-Dex Charge yes  -LB                User Key  (r) = Recorded By, (t) = Taken By, (c) = Cosigned By      Initials Name Provider Type    Lindsay Rodriguez, PT Physical Therapist                                    Therapy Education  Education Details: progressed HEP, discussed sciatic management, bioimpedance results and interpretation, compression garment recommendation for air travel, s/s of lymphedema, radiation skincare  Given: HEP, Mobility training, Symptoms/condition management, Edema management, Posture/body mechanics  Program: Reinforced, Progressed  How Provided: Verbal, Demonstration, Written  Provided to: Patient  Level of Understanding: Verbalized, Teach back education performed, Demonstrated  16825 - PT Self Care/Mgmt Minutes: 10       OP Exercises       Row Name 08/30/23 1400             Subjective Comments     Subjective Comments I am doing ok. I am still healing from radiation. I also had a sciatic flare up from the way I was angled on the table for 3 days of radiation.  -LB         Subjective Pain    Able to rate subjective pain? yes  -LB      Pre-Treatment Pain Level 2  -LB         Total Minutes    07318 - PT Therapeutic Exercise Minutes 25  -LB         Exercise 1    Exercise Name 1 tband row/extension  -LB      Sets 1 2  -LB      Reps 1 10  -LB      Time 1 RTB  -LB         Exercise 2    Exercise Name 2 seated UT stretch  -LB      Reps 2 3  -LB      Time 2 20  -LB         Exercise 3    Exercise Name 3 SL ER  -LB      Sets 3 2  -LB      Reps 3 10  -LB         Exercise 4    Exercise Name 4 supine sAP  -LB      Sets 4 2  -LB      Reps 4 10  -LB         Exercise 5    Exercise Name 5 supine HA  -LB      Sets 5 2  -LB      Reps 5 10  -LB      Time 5 RTB  -LB         Exercise 6    Exercise Name 6 prone row/extension  -LB      Sets 6 2  -LB      Reps 6 10  -LB      Time 6 2#  -LB         Exercise 7    Exercise Name 7 prone T/Y  -LB      Sets 7 2  -LB      Reps 7 10  -LB         Exercise 8    Exercise Name 8 supine HS stretch  -LB      Reps 8 3  -LB      Time 8 20  -LB         Exercise 9    Exercise Name 9 supine sciatic nerve glide  -LB      Reps 9 10  -LB                User Key  (r) = Recorded By, (t) = Taken By, (c) = Cosigned By      Initials Name Provider Type    Lindsay Rodriguez, PT Physical Therapist                                 PT OP Goals       Row Name 08/30/23 1400          Long Term Goals    LTG Date to Achieve 08/25/23  -LB     LTG 1 Pt will maintain LDex bioimpedance WNL.  -LB     LTG 1 Progress Ongoing  -LB     LTG 1 Progress Comments WNL today 2.7  -LB     LTG 2 Pt will verbalize s/s of lymphedema and steps to prevention.  -LB     LTG 2 Progress Ongoing  -LB     LTG 2 Progress Comments reviewed today  -LB     LTG 3 Pt will acquire appropriately fitted compression garment and verbalize appropriate wear  schedule.  -LB     LTG 3 Progress Met  -LB               User Key  (r) = Recorded By, (t) = Taken By, (c) = Cosigned By      Initials Name Provider Type    Lindsay Rodriguez, PT Physical Therapist                     PT Assessment/Plan       Row Name 08/30/23 7099          PT Assessment    Functional Limitations Other (comment);Limitations in functional capacity and performance  at risk for lymphedema  -LB     Impairments Impaired flexibility;Impaired lymphatic circulation;Posture;Pain  -LB     Assessment Comments Pt returns for post radiation follow up. She is still healing from radiation but condition is improving. She did have sciatic aggravation from positioning on table but this has improved. We reviewed HEP for UE, progressed to include prone scapular strengthening and added several stretches to manage sciatic pain. Pt's Ldex score remains WNL at 2.7. She will travel tomorrow to [facility]. We discussed compression garment recommendations for air travel and reviewed s/s of lymphedema. Pt has met 1/3 LTGs. She remains appropriate for continued skilled PT services to address deficits as needed and continue to monitor bioimpedance.  -LB     Rehab Potential Good  -LB     Patient/caregiver participated in establishment of treatment plan and goals Yes  -LB     Patient would benefit from skilled therapy intervention Yes  -LB        PT Plan    PT Frequency Other (comment)  -LB     Predicted Duration of Therapy Intervention (PT) repeat bioimpedance in 3 months  -LB     Planned CPT's? PT EVAL LOW COMPLEXITY: 39852;PT RE-EVAL: 46828;PT THER PROC EA 15 MIN: 68674;PT THER ACT EA 15 MIN: 37316;PT MANUAL THERAPY EA 15 MIN: 26202;PT SELF CARE/HOME MGMT/TRAIN EA 15: 89636;PT BIS XTRACELL FLUID ANALYSIS: 14487  -LB     PT Plan Comments review HEP, repeat bioimpedance, sciatic issues?  -LB               User Key  (r) = Recorded By, (t) = Taken By, (c) = Cosigned By      Initials Name Provider Type    Lindsay Rodriguez PT Physical  Therapist                                 Time Calculation:   Start Time: 1400  Stop Time: 1448  Time Calculation (min): 48 min  Total Timed Code Minutes- PT: 40 minute(s)  Timed Charges  08950 - PT Therapeutic Exercise Minutes: 25  31769 - PT Self Care/Mgmt Minutes: 10  Total Minutes  Timed Charges Total Minutes: 10   Total Minutes: 10   Therapy Charges for Today       Code Description Service Date Service Provider Modifiers Qty    03409634402 HC PT BIS XTRACELL FLUID ANALYSIS 8/30/2023 Lindsay Kat, PT  1    38368190001 HC PT THER PROC EA 15 MIN 8/30/2023 Lindsay Kat, PT GP 2    48674532070 HC PT SELF CARE/MGMT/TRAIN EA 15 MIN 8/30/2023 Lindsay Kat, PT GP 1                      Lindsay Kat PT  8/30/2023

## 2023-09-20 ENCOUNTER — TELEPHONE (OUTPATIENT)
Dept: RADIATION ONCOLOGY | Facility: HOSPITAL | Age: 63
End: 2023-09-20
Payer: COMMERCIAL

## 2023-09-20 ENCOUNTER — PATIENT OUTREACH (OUTPATIENT)
Dept: OTHER | Facility: HOSPITAL | Age: 63
End: 2023-09-20
Payer: COMMERCIAL

## 2023-09-21 ENCOUNTER — OFFICE VISIT (OUTPATIENT)
Dept: RADIATION ONCOLOGY | Facility: HOSPITAL | Age: 63
End: 2023-09-21
Payer: COMMERCIAL

## 2023-09-21 ENCOUNTER — HOSPITAL ENCOUNTER (OUTPATIENT)
Dept: RADIATION ONCOLOGY | Facility: HOSPITAL | Age: 63
Setting detail: RADIATION/ONCOLOGY SERIES
End: 2023-09-21
Payer: COMMERCIAL

## 2023-09-21 VITALS
SYSTOLIC BLOOD PRESSURE: 115 MMHG | DIASTOLIC BLOOD PRESSURE: 66 MMHG | HEART RATE: 80 BPM | BODY MASS INDEX: 29.55 KG/M2 | OXYGEN SATURATION: 97 % | WEIGHT: 161.6 LBS

## 2023-09-21 DIAGNOSIS — C50.911 DUCTAL CARCINOMA IN SITU (DCIS) OF RIGHT BREAST WITH MICROINVASIVE COMPONENT: Primary | ICD-10-CM

## 2023-09-21 PROCEDURE — G0463 HOSPITAL OUTPT CLINIC VISIT: HCPCS

## 2023-09-21 NOTE — PROGRESS NOTES
Subjective     No ref. provider found     Cancer Staging   CC: 4 week follow up for [id number] right breast cancer with dcis     S:    I had the pleasure of seeing Gaby Dobbins  [facility].  She is a pleasant 63 year old female with [id number] right breast cancer, with high grade dcis, ER MT negative Her 2 negative, ki67 88%. She returns today for follow up now 4 weeks out from completion of her radiation therapy to the breast.  She completed a dose of 4256cGy followed by a tumor bed boost of 1000cgy on 8/21/23.  She has been doing well since I last saw her.      Review of Systems   Constitutional: Negative.    Skin: Negative.    Psychiatric/Behavioral: Negative.         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     Migraines          Past Surgical History:   Procedure Laterality Date    BLEPHAROPLASTY Left     LOWER    BREAST LUMPECTOMY WITH SENTINEL NODE BIOPSY Right 6/19/2023    Procedure: Right needle-localized, bracketed, partial mastectomy and sentinel lymph node biopsy;  Surgeon: Kristin Mojica MD;  Location: [facility];  Service: General;  Laterality: Right;    KIDNEY STONE SURGERY  2000    KNEE MENISCAL REPAIR           Social History     Socioeconomic History    Marital status:     Number of children: 2   Tobacco Use    Smoking status: Never    Smokeless tobacco: Never   Vaping Use    Vaping Use: Never used   Substance and Sexual Activity    Alcohol use: Yes     Comment: SOCIAL    Drug use: Never    Sexual activity: Defer         Family History   Problem Relation Age of Onset    Hypertension Father     Malig Hyperthermia Neg Hx           Objective    Physical Exam  Constitutional:       Appearance: Normal appearance.   Chest:          Comments: Right breast slightly smaller than left, minimal hyperpigmentation  Neurological:      Mental Status: She is alert.         Current  Outpatient Medications on File Prior to Visit   Medication Sig Dispense Refill    cetirizine (zyrTEC) 10 MG tablet Take 1 tablet by mouth Daily As Needed for Allergies.      Cholecalciferol (Vitamin D3) 25 MCG (1000 UT) capsule       fexofenadine-pseudoephedrine (ALLEGRA-D 24) 180-240 MG per 24 hr tablet Take 1 tablet by mouth Daily. 90 tablet 1    gabapentin (Neurontin) 100 MG capsule Take 1 capsule by mouth 3 (Three) Times a Day. 90 capsule 0    MAGNESIUM PO Take  by mouth. HOLD FOR SURGERY      Multiple Vitamin (MULTI VITAMIN) tablet Take  by mouth. HOLD FOR SURGERY      SUMAtriptan-naproxen (TREXIMET)  MG per tablet TAKE ONE TABLET BY MOUTH AT ONSET OF HEADACHE. MAY REPEAT AFTER TWO HOURS. MAX TWO DOSES DAILY (Patient taking differently: TAKE ONE TABLET BY MOUTH AT ONSET OF HEADACHE. MAY REPEAT AFTER TWO HOURS. MAX TWO DOSES DAILY  HOLD PER MD INSTRUCTIONS PRIOR TO SURGERY) 9 tablet 11     No current facility-administered medications on file prior to visit.       ALLERGIES:    Allergies   Allergen Reactions    Moxifloxacin Hives       There were no vitals taken for this visit.         7/25/2023    12:09 PM   Current Status   ECOG score 0         Assessment & Plan     63 year old female with [id number] right breast cancer, with high grade dcis, ER KS negative Her 2 negative, ki67 88% now 4 weeks out from radiation with no evidence of disease on exam.  She will due for her yearly mammogram in March 2024 and follow up with  shortly after.  She will have regular follow up with her medical oncologist.  I will see her back in one year for follow up.  She knows to call for this appointment.             Thank you very much for allowing me to participate in the care of this very pleasant patient.    Sincerely,      Shyann Thomson MD

## 2023-10-02 NOTE — PROGRESS NOTES
[facility]     Referring Provider: Graciela Angulo MD     Chief complaint: Rt breast cancer follow up     Subjective   HPI:   5/31/2023:  Ms. Gaby Dobbins is a 62 yo woman, seen at the request of Dr. Graciela Angulo, for a new diagnosis of right breast cancer. This was initially detected as an imaging abnormality on routine screening. Her work-up is detailed in the oncologic history below. Prior to the biopsy, she denies any breast lumps, pain, skin changes, or nipple discharge. She denies any prior history of abnormal mammograms or breast biopsies. Her most recent mammogram prior to this year was in 2016. She denies any family history of breast or ovarian cancer.     7/5/2023  She underwent right partial mastectomy and SLNB on 6/19/23. See surgery & pathology details below in oncologic history. She has been recovering well and has no complaints.  She is very interested in alternative medicine options and wants me to sign off on receiving high-dose IV vitamin C from a wellness center.  She was joined today in clinic by her .      10/3/2023 Interval History  Pt presenting to the office today for routine follow up.  She completely radiation in August. She saw oncology in July, it was not recommended to have adjuvant endocrine therapy or chemotherapy due to triple negative status. She has no new breast complaints or concerns today. SHe saw PT in July with a normal bio impedence score.     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  There is a stable very small, oval mass measuring 3 mm seen in the sub areolar region of the left breast. No suspicious masses, suspicious microcalcifications or new areas of architectural distortion are identified. There has been no significant change from the prior exam(s).  In the right breast,  no suspicious masses, significant calcifications or other abnormalities are seen.  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  There are calcifications with linear distribution seen in the posterior one-third central region of the right breast.  In the left breast, no suspicious masses, significant calcifications or other abnormalities are seen.  BI-RADS 0: Incomplete     5/1/2023 Imaging    Right Diagnostic MMG with [facility] ([facility])  On the present examination, there are multiple new indistinct calcifications with linear distribution in the posterior one third of the right breast at 8 o'clock, central located 8 cm from the nipple. This spans approximately 2 cm. Additional imaging demonstrates microcalcifications are suspicious and tissue sampling is recommended.  BI-RADS 4B: Suspicious     5/15/2023 Biopsy    Right Breast, Stereotactic Biopsy ([facility]):    Right Breast, 8:00, stereotactic core biopsies   High grade solid ductal carcinoma in situ (DCIS) with rare foci of irregular ductal clusters, Highly suspicious for invasive carcinoma.     Ductal carcinoma in situ (DCIS)    Nuclear grade: high    Architectural pattern(s): solid    Necrosis: not identified    Size: 3mm   Microcalcifications: present in DCIS    ER negative (0%)  MI negative (0%)  Ki-67 88.66%  Her2 negative (IHC 1+)    -Top hat clip.  A 2 view postprocedure mammogram demonstrated the marker clip to have migrated approximately 2 cm lateral on the cc view from the expected biopsy site and a small hematoma.  Residual calcifications are present.    [facility] PATHOLOGY REVIEW    1. Right Breast, 8:00, Stereotactic-Guided Core Needle Biopsy:               A. MICROINVASIVE CARCINOMA, 0.9 MM , ARISING OUT OF A BACKGROUND OF HIGH GRADE                    DUCTAL CARCINOMA IN SITU (DCIS) WITH A BRISK LYMPHOCYTIC RESPONSE, Solid type with       single cell necrosis and calcifications.               B. Received immunostains performed at the outside institution shows the following from blocks A, B, and D with the following results (all controls reacted appropriately).                            P63:  Highlights an intact myoepithelial cell layer around the in-situ carcinoma with focal loss in 1D [facility]:  Highlights an intact myoepithelial cell layer around the in-situ carcinoma with focal loss in 1D               C. Repeat P63 immunostain as well as cytokeratin immunostain performed on unstained slides provided for block D shows the following (all controls reacted appropriately):                            P63: Intact myoepithelial cell layer in the area of in situ carcinoma and loss in the area of                            microinvasive carcinoma                            AE1/AE3: Highlights the irregularity of malignant cell groups               D. See comment.    Comment:  We agree with the outside diagnosis rendered. Per outside report, hormone receptor studies were performed on block B to show the following:      ER:  Negative (0)  CO:  Negative (0)  Ki67:  88.66%     HER2 immunohistochemistry has been requested to be performed at the original institution on block D. Results to follow and be scanned separately into the patient's electronic medical record.      5/19/2023 Imaging    Bilateral Breast MRI ([facility]):  RIGHT BREAST:    At 7:00 in the posterior right breast, 7 cm posterior to the nipple, there is a 2.2 cm AP dimension, 3.4 cm transverse dimension, 1.0 cm craniocaudal dimension T2 hyperintense biopsy cavity with thin peripheral enhancement. There is a focus of susceptibility from a biopsy clip within the lateral aspect of the cavity, which was noted to be laterally displaced on the post biopsy mammogram. Additional susceptibility within the cavity likely  reflects residual air from recent biopsy. No suspicious enhancement is identified at the biopsy site or elsewhere within the right breast.  No suspicious enhancement is identified in the right nipple or chest wall.   The visualized axilla is within normal limits.   LEFT BREAST:    No suspicious enhancing mass or area of non-mass enhancement is identified.  The visualized axilla is within normal limits.   EXTRAMAMMARY FINDINGS:   There are no pathologically enlarged internal mammary chain lymph nodes on either side.     BI-RADS 6: Known malignancy.     5/31/2023 Genetic Testing    [employer] Common Hereditary Cancers Panel (47 genes):    Negative     6/1/2023 Imaging    Right Diagnostic MMG with Javier ([facility]):  There is a top hat clip in the outer central posterior right breast from recent biopsy, which is located within the lateral aspect of an approximately 3.5 cm transverse oriented linear biopsy tract.  Approximately 1.6 cm faint residual calcifications are identified within the medial aspect of the tract and slightly medial and anterior to the tract on the CC view. Additional calcifications were previously noted  extending approximately 0.9 cm lateral to the most posterolateral calcification seen on the current examination, which were previously biopsied. This area has been marked and should be included at the time of excision.  The biopsy site, including the clip and calcifications, were difficult to include in the field-of-view on lateral and MLO spot magnification views due to the posterior location. Residual calcifications are  visualized predominantly posterior to the top hat clip on the lateral spot magnification view and predominantly inferior to the clip on the full field MLO view. Differences in positioning of the clip relative to the residual calcifications is likely due to differences in patient positioning. Residual calcifications measure approximately 1.2 cm on the MLO view.  BI-RADS 6: Known  malignancy.     6/19/2023 Surgery    Right needle-localized, bracketed, partial mastectomy and sentinel lymph node biopsy    1. Right Breast, Double Needle-Localized Partial Mastectomy (39 Grams):               A. HIGH GRADE DUCTAL CARCINOMA IN-SITU (DCIS):                            1. Solid type with calcifications.                            2. Extent of DCIS:  12 mm (based on slices involved).                            3. Margins are negative for in-situ carcinoma; Closest distance:  DCIS is present 2.5 mm from the inferior margin (superseded by specimen #3).               B. Clip and biopsy site changes are present and adjacent to in-situ carcinoma.               C. See synoptic report (to include parts 2-5) and Comments.     2. Right Breast, Additional Anterior, Superior and Medial Margins, Re-excision:               A. Breast parenchyma with no significant histopathologic changes       (additional 6 mm of tissue free of carcinoma).     3. Right Breast, Additional Inferior and Lateral Margins, Re-excision:               A. Breast parenchyma with focal biopsy site changes                    (additional 16 mm of tissue free of carcinoma).      4. Right Axillary [place] Lymph Node #1, Hot and Blue, Count 3,600, Biopsy:                 A. One lymph node, negative for carcinoma (0/1).     5. Right Axillary [place] Lymph Node #2, Hot and Blue, Count 1,000, Biopsy:                 A. One lymph node, negative for carcinoma (0/1).         Review of Systems:  See interval history.       Medications:    Current Outpatient Medications:     cetirizine (zyrTEC) 10 MG tablet, Take 1 tablet by mouth Daily As Needed for Allergies., Disp: , Rfl:     Cholecalciferol (Vitamin D3) 25 MCG (1000 UT) capsule, , Disp: , Rfl:     fexofenadine-pseudoephedrine (ALLEGRA-D 24) 180-240 MG per 24 hr tablet, Take 1 tablet by mouth Daily., Disp: 90 tablet, Rfl: 1    gabapentin (Neurontin) 100 MG capsule, Take 1 capsule by mouth 3 (Three)  Times a Day., Disp: 90 capsule, Rfl: 0    MAGNESIUM PO, Take  by mouth. HOLD FOR SURGERY, Disp: , Rfl:     Multiple Vitamin (MULTI VITAMIN) tablet, Take  by mouth. HOLD FOR SURGERY, Disp: , Rfl:     SUMAtriptan-naproxen (TREXIMET)  MG per tablet, TAKE ONE TABLET BY MOUTH AT ONSET OF HEADACHE. MAY REPEAT AFTER TWO HOURS. MAX TWO DOSES DAILY (Patient taking differently: TAKE ONE TABLET BY MOUTH AT ONSET OF HEADACHE. MAY REPEAT AFTER TWO HOURS. MAX TWO DOSES DAILY HOLD PER MD INSTRUCTIONS PRIOR TO SURGERY), Disp: 9 tablet, Rfl: 11      Allergies   Allergen Reactions    Moxifloxacin Hives       Family History   Problem Relation Age of Onset    Hypertension Father     Malig Hyperthermia Neg Hx        Objective   PHYSICAL EXAMINATION:   Vitals:    10/03/23 0827   BP: 114/74   Pulse: 80   SpO2: 98%       ECOG 0 - Asymptomatic  General: NAD, well appearing  Psych: a&o x3, normal mood and affect  Eyes: EOMI, no scleral icterus  ENMT: neck supple without masses or thyromegaly, mucous membranes moist  MSK: normal gait, normal ROM in bilateral shoulders  Lymph nodes: Well-healing right axillary incision with Dermabond intact.  No swelling.  Breast: asymmetric, moderate size, grade 3 ptosis R<L  Right: Well-healed lower outer radial incision.  No masses or nipple abnormalities. Breast is slightly pink and smaller from radiation.   Left: No visible abnormalities on inspection while seated, with arms raised or hands on hips.  No masses, skin changes or nipple abnormalities      Assessment & Plan   Assessment:  63 y.o. F with a diagnosis of right breast cancer: Microinvasive carcinoma (0.9 mm) arising in a background of high-grade ductal carcinoma in situ (DCIS), ER/HI negative, Her2 negative. She underwent right partial mastectomy and SLNB on 6/19/23, with no residual invasive disease in the surgical specimen only DCIS, [id number], anatomic stage IA, prognostic stage IA.       Plan:  -cont with Dr. Zamora  -cont with   Alix  -I reassured her that she has an excellent prognosis, especially if she follows traditional medicine recommendations. I have asked her to hold off on any alternative medicine treatments until she sees the above doctors.  -cont with Lymphedema clinic  -screening mammo in March followed by exam     KELLY Herman      CC:  MD Kira Squires APRN Mollie Cartwright, MD

## 2023-10-03 ENCOUNTER — OFFICE VISIT (OUTPATIENT)
Dept: SURGERY | Facility: CLINIC | Age: 63
End: 2023-10-03
Payer: COMMERCIAL

## 2023-10-03 VITALS
DIASTOLIC BLOOD PRESSURE: 74 MMHG | BODY MASS INDEX: 29.63 KG/M2 | WEIGHT: 161 LBS | HEART RATE: 80 BPM | OXYGEN SATURATION: 98 % | HEIGHT: 62 IN | SYSTOLIC BLOOD PRESSURE: 114 MMHG

## 2023-10-03 DIAGNOSIS — Z12.31 ENCOUNTER FOR SCREENING MAMMOGRAM FOR MALIGNANT NEOPLASM OF BREAST: Primary | ICD-10-CM

## 2023-10-03 PROCEDURE — 99213 OFFICE O/P EST LOW 20 MIN: CPT | Performed by: NURSE PRACTITIONER

## 2023-10-26 ENCOUNTER — DOCUMENTATION (OUTPATIENT)
Dept: RADIATION ONCOLOGY | Facility: HOSPITAL | Age: 63
End: 2023-10-26
Payer: COMMERCIAL

## 2023-10-26 DIAGNOSIS — C50.911 DUCTAL CARCINOMA IN SITU (DCIS) OF RIGHT BREAST WITH MICROINVASIVE COMPONENT: Primary | ICD-10-CM

## 2023-10-26 NOTE — PROGRESS NOTES
Radiation Treatment Summary Note      Patient Name: Gaby Dobbins  : 1960    Attending Provider: Shyann Thomson MD      Diagnosis:     ICD-10-CM ICD-9-CM   1. 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  C50.911 174.9   Site: right breast     Radiation Start Date: 23    Radiation Completion Date: 23      Prescription:     [id number] in 16 fractions to right breast using 6 mv photon tangential fields  followed by a 1000cgy tumor bed boost in 4 fractions,    Final Delivered Dose Deviated From Initially Prescribed Dose: No    Concurrent Chemotherapy: No    Patient Tolerated Treatment Without Unexpected Side Effects/Complications: Yes    ECOG: Fully active, able to carry on all pre-disease performance without restriction = 0    Pain Management Plan: None Indicated/PRN OTC    Follow-Up Plan: 3 months    Imaging Ordered for Follow-Up: None/NA        Shyann Thomson MD

## 2023-11-13 ENCOUNTER — OFFICE VISIT (OUTPATIENT)
Dept: OTHER | Facility: HOSPITAL | Age: 63
End: 2023-11-13
Payer: COMMERCIAL

## 2023-11-13 VITALS
BODY MASS INDEX: 29.08 KG/M2 | TEMPERATURE: 99.5 F | SYSTOLIC BLOOD PRESSURE: 119 MMHG | HEIGHT: 62 IN | DIASTOLIC BLOOD PRESSURE: 75 MMHG | OXYGEN SATURATION: 98 % | WEIGHT: 158 LBS | HEART RATE: 92 BPM

## 2023-11-13 DIAGNOSIS — R29.898 MUSCULAR DECONDITIONING: ICD-10-CM

## 2023-11-13 DIAGNOSIS — R53.0 NEOPLASTIC MALIGNANT RELATED FATIGUE: ICD-10-CM

## 2023-11-13 DIAGNOSIS — C50.911 DUCTAL CARCINOMA IN SITU (DCIS) OF RIGHT BREAST WITH MICROINVASIVE COMPONENT: Primary | ICD-10-CM

## 2023-11-13 DIAGNOSIS — M54.32 SCIATIC NERVE PAIN, LEFT: ICD-10-CM

## 2023-11-13 PROCEDURE — G0463 HOSPITAL OUTPT CLINIC VISIT: HCPCS | Performed by: NURSE PRACTITIONER

## 2023-11-13 NOTE — PROGRESS NOTES
[facility] MULTIDISCIPLINARY CLINIC  SURVIVORSHIP VISIT: IN CLINIC  Survivorship Treatment Summary Initial Visit        Gaby Dobbins is a pleasant 63 y.o. female being followed by Justine Zamora MD for right breast DCIS. Reviewed today in Multidisciplinary Clinic, for initial survivorship treatment summary visit.     HPI  Lea 63-year-old patient who is status post right breast lumpectomy with 2 negative sentinel nodes for DCIS ER/MO negative.  She completed adjuvant right breast radiation August 21, 2023.    She has been struggling with significant fatigue since completion of radiation.  It did not really worsen until she was done with everything.  She does think it has improved minimally since August.  In addition she developed a flareup of left sciatic nerve pain in the middle of radiation which has persisted.  The pain radiates down the left hip buttock all the way to the leg.  She has a hard time getting comfortable in bed and is really interfering with her sleep.  She is waking up frequently using pillows and trying to stretch but not getting much relief.  She has not discussed this with her primary care yet.  She knows the pain is interrupting sleep which is perpetuating the fatigue but she does feel the fatigue also worsened during radiation.    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  There is a stable very small, oval mass measuring 3 mm seen in the sub areolar region of the left breast. No suspicious masses, suspicious microcalcifications or new areas of architectural distortion are identified. There has been no significant change from the prior exam(s).  In the right breast, no suspicious masses, significant calcifications or other abnormalities are seen.  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  There are calcifications with linear distribution seen in the posterior one-third central region of the right breast.  In the left breast, no suspicious masses, significant calcifications or other abnormalities are seen.  BI-RADS 0: Incomplete     5/1/2023 Imaging    Right Diagnostic MMG with [facility] ([facility])  On the present examination, there are multiple new indistinct calcifications with linear distribution in the posterior one third of the right breast at 8 o'clock, central located 8 cm from the nipple. This spans approximately 2 cm. Additional imaging demonstrates microcalcifications are suspicious and tissue sampling is recommended.  BI-RADS 4B: Suspicious     5/15/2023 Biopsy    Right Breast, Stereotactic Biopsy ([facility]):    Right Breast, 8:00, stereotactic core biopsies   High grade solid ductal carcinoma in situ (DCIS) with rare foci of irregular ductal clusters, Highly suspicious for invasive carcinoma.     Ductal carcinoma in situ (DCIS)    Nuclear grade: high    Architectural pattern(s): solid    Necrosis: not identified    Size: 3mm   Microcalcifications: present in DCIS    ER negative (0%)  WV negative (0%)  Ki-67 88.66%  Her2 negative (IHC 1+)    -Top hat clip.  A 2 view postprocedure mammogram demonstrated the marker clip to have migrated approximately 2 cm lateral on the cc view from the expected biopsy site and a small hematoma.  Residual calcifications are present.    [facility] PATHOLOGY REVIEW    1. Right Breast, 8:00, Stereotactic-Guided Core Needle Biopsy:               A. MICROINVASIVE CARCINOMA, 0.9 MM , ARISING OUT OF A BACKGROUND OF HIGH GRADE                   DUCTAL CARCINOMA IN SITU (DCIS) WITH A BRISK LYMPHOCYTIC RESPONSE, Solid type with       single cell necrosis  and calcifications.               B. Received immunostains performed at the outside institution shows the following from blocks A, B, and D with the following results (all controls reacted appropriately).                            P63:  Highlights an intact myoepithelial cell layer around the in-situ carcinoma with focal loss in 1D [facility]:  Highlights an intact myoepithelial cell layer around the in-situ carcinoma with focal loss in 1D               C. Repeat P63 immunostain as well as cytokeratin immunostain performed on unstained slides provided for block D shows the following (all controls reacted appropriately):                            P63: Intact myoepithelial cell layer in the area of in situ carcinoma and loss in the area of                            microinvasive carcinoma                            AE1/AE3: Highlights the irregularity of malignant cell groups               D. See comment.    Comment:  We agree with the outside diagnosis rendered. Per outside report, hormone receptor studies were performed on block B to show the following:      ER:  Negative (0)  PA:  Negative (0)  Ki67:  88.66%     HER2 immunohistochemistry has been requested to be performed at the original institution on block D. Results to follow and be scanned separately into the patient's electronic medical record.      5/19/2023 Imaging    Bilateral Breast MRI ([facility]):  RIGHT BREAST:    At 7:00 in the posterior right breast, 7 cm posterior to the nipple, there is a 2.2 cm AP dimension, 3.4 cm transverse dimension, 1.0 cm craniocaudal dimension T2 hyperintense biopsy cavity with thin peripheral enhancement. There is a focus of susceptibility from a biopsy clip within the lateral aspect of the cavity, which was noted to be laterally displaced on the post biopsy mammogram. Additional susceptibility within the cavity likely reflects residual air from recent biopsy. No suspicious enhancement is identified at the biopsy site or  elsewhere within the right breast.  No suspicious enhancement is identified in the right nipple or chest wall.   The visualized axilla is within normal limits.   LEFT BREAST:    No suspicious enhancing mass or area of non-mass enhancement is identified.  The visualized axilla is within normal limits.   EXTRAMAMMARY FINDINGS:   There are no pathologically enlarged internal mammary chain lymph nodes on either side.     BI-RADS 6: Known malignancy.     5/31/2023 Genetic Testing    [employer] Common Hereditary Cancers Panel (47 genes):    Negative     6/1/2023 Imaging    Right Diagnostic MMG with J[facility] ([facility]):  There is a top hat clip in the outer central posterior right breast from recent biopsy, which is located within the lateral aspect of an approximately 3.5 cm transverse oriented linear biopsy tract.  Approximately 1.6 cm faint residual calcifications are identified within the medial aspect of the tract and slightly medial and anterior to the tract on the CC view. Additional calcifications were previously noted  extending approximately 0.9 cm lateral to the most posterolateral calcification seen on the current examination, which were previously biopsied. This area has been marked and should be included at the time of excision.  The biopsy site, including the clip and calcifications, were difficult to include in the field-of-view on lateral and MLO spot magnification views due to the posterior location. Residual calcifications are  visualized predominantly posterior to the top hat clip on the lateral spot magnification view and predominantly inferior to the clip on the full field MLO view. Differences in positioning of the clip relative to the residual calcifications is likely due to differences in patient positioning. Residual calcifications measure approximately 1.2 cm on the MLO view.  BI-RADS 6: Known malignancy.     6/19/2023 Surgery    Right needle-localized, bracketed, partial mastectomy and sentinel lymph  node biopsy    1. Right Breast, Double Needle-Localized Partial Mastectomy (39 Grams):               A. HIGH GRADE DUCTAL CARCINOMA IN-SITU (DCIS):                            1. Solid type with calcifications.                            2. Extent of DCIS:  12 mm (based on slices involved).                            3. Margins are negative for in-situ carcinoma; Closest distance:  DCIS is present 2.5 mm from the inferior margin (superseded by specimen #3).               B. Clip and biopsy site changes are present and adjacent to in-situ carcinoma.               C. See synoptic report (to include parts 2-5) and Comments.     2. Right Breast, Additional Anterior, Superior and Medial Margins, Re-excision:               A. Breast parenchyma with no significant histopathologic changes       (additional 6 mm of tissue free of carcinoma).     3. Right Breast, Additional Inferior and Lateral Margins, Re-excision:               A. Breast parenchyma with focal biopsy site changes                    (additional 16 mm of tissue free of carcinoma).      4. Right Axillary [place] Lymph Node #1, Hot and Blue, Count 3,600, Biopsy:                 A. One lymph node, negative for carcinoma (0/1).     5. Right Axillary [place] Lymph Node #2, Hot and Blue, Count 1,000, Biopsy:                 A. One lymph node, negative for carcinoma (0/1).     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     Migraines        Past Surgical History:   Procedure Laterality Date    BLEPHAROPLASTY Left     LOWER    BREAST LUMPECTOMY WITH SENTINEL NODE BIOPSY Right 6/19/2023    Procedure: Right needle-localized, bracketed, partial mastectomy and sentinel lymph node biopsy;  Surgeon: Kristin Mojica  "MD;  Location: [facility] OR;  Service: General;  Laterality: Right;    KIDNEY STONE SURGERY  2000    KNEE MENISCAL REPAIR         Social History     Socioeconomic History    Marital status:     Number of children: 2   Tobacco Use    Smoking status: Never     Passive exposure: Never    Smokeless tobacco: Never   Vaping Use    Vaping Use: Never used   Substance and Sexual Activity    Alcohol use: Yes     Comment: SOCIAL    Drug use: Never    Sexual activity: Defer         No results found for: \"LDH\", \"URICACID\"      Lab Results   Component Value Date    GLUCOSE 88 06/12/2023    BUN 16 06/12/2023    CREATININE 0.75 06/12/2023    EGFRIFNONA 69 04/10/2019    EGFRIFAFRI 84 04/10/2019    BCR 21.3 06/12/2023    K 4.1 06/12/2023    CO2 24.0 06/12/2023    CALCIUM 9.2 06/12/2023    PROTENTOTREF 6.5 06/06/2022    ALBUMIN 4.2 06/06/2022    LABIL2 1.8 06/06/2022    AST 27 06/06/2022    ALT 25 06/06/2022       CBC w/diff          6/12/2023    08:53 7/25/2023    12:01   CBC w/Diff   WBC 5.12  6.63    RBC 4.62  4.60    Hemoglobin 13.4  13.3    Hematocrit 39.8  39.8    MCV 86.1  86.5    MCH 29.0  28.9    MCHC 33.7  33.4    RDW 13.0  13.7    Platelets 214  185    Neutrophil Rel % 49.4  49.7    Immature Granulocyte Rel % 0.2  0.8    Lymphocyte Rel % 31.3  31.5    Monocyte Rel % 10.5  10.1    Eosinophil Rel % 8.0  7.1    Basophil Rel % 0.6  0.8        Allergies as of 11/13/2023 - Reviewed 11/13/2023   Allergen Reaction Noted    Moxifloxacin Hives 12/09/2016        MEDICATIONS:  Medication list reviewed today    Review of Systems   Constitutional:  Positive for activity change and fatigue. Negative for appetite change and unexpected weight change.   Respiratory:  Negative for chest tightness and shortness of breath.    Cardiovascular:  Negative for chest pain and leg swelling.   Gastrointestinal:  Negative for blood in stool, constipation and diarrhea.   Genitourinary:  Negative for dysuria and hematuria.   Musculoskeletal:  " "Positive for arthralgias and back pain (left sciatic nerve pain radiating down left leg). Negative for myalgias.   Neurological:  Negative for dizziness and light-headedness.   Psychiatric/Behavioral:  Positive for sleep disturbance (related to left sciatic nerve pain). Negative for decreased concentration and dysphoric mood. The patient is not nervous/anxious.        /75   Pulse 92   Temp 99.5 °F (37.5 °C)   Ht 157.5 cm (62\")   Wt 71.7 kg (158 lb)   SpO2 98%   BMI 28.90 kg/m²     Wt Readings from Last 3 Encounters:   11/13/23 71.7 kg (158 lb)   10/03/23 73 kg (161 lb)   09/21/23 73.3 kg (161 lb 9.6 oz)        There were no vitals filed for this visit.    PHQ-9 Total Score: 4   GAD7 Total Score: 1   Distress Score: 2         Physical Exam  Constitutional:       Appearance: Normal appearance. She is well-groomed.   HENT:      Head: Normocephalic and atraumatic.   Cardiovascular:      Rate and Rhythm: Normal rate and regular rhythm.   Pulmonary:      Effort: No tachypnea or respiratory distress.   Abdominal:      General: Abdomen is flat. There is no distension.   Musculoskeletal:      Right ankle: No swelling. No tenderness. Normal pulse.      Left ankle: No swelling. No tenderness. Normal pulse.   Skin:     General: Skin is warm and dry.   Neurological:      Mental Status: She is alert and oriented to person, place, and time.   Psychiatric:         Attention and Perception: Attention and perception normal.         Mood and Affect: Mood and affect normal.         Speech: Speech normal.         Behavior: Behavior normal. Behavior is cooperative.         Thought Content: Thought content normal.           Advance Care Planning     Patient does not have advance care planning complete, we do not have a copy on file    Patient has not designated a healthcare surrogate: She is working with an  on document prep.    Brief discussion and written information provided regarding advance care planning and " appropriateness for all healthy adults, choosing a healthcare surrogate.     Information provided on upcoming Advance Care Planning classes offered virtually through [facility].     Advised arrangements can be made to schedule an appointment with myself or another advance care planning facilitator at their convenience. Patients may also call the toll free [facility] ACP Help Line at [phone].           DISCUSSION HELD TODAY:   Discussed NCCN recommendations for all cancer survivors of 150 minutes/week moderate intensity exercise, achieve and maintain a healthy weight, plants-based whole-foods diet, avoid tobacco and second hand smoke, avoid alcohol or minimize alcohol intake - no more than 1 drink in a day for adults.     A copy of the Survivorship Treatment Summary & Care Plan for Ms. Dobbins was provided to and forwarded to the providers identified on the care team.    Problems identified:  Fatigue: Worsened significantly after completion of radiation.  Has been potentiated by poor sleep quality related to left sciatic nerve pain.  The sciatic nerve pain developed midway through radiation due to the way she was positioned.    She is asking about potential lab abnormalities that may be potentiating fatigue and we discussed reviewing B12 vitamin D and thyroid studies  She is currently seeing the lymphedema therapist for surveillance.  She has full range of motion of her upper extremities.  She is agreeable to some additional physical therapy to assist with fatigue, assistance with muscle strengthening and is well as help with the left sciatic nerve pain and is hopeful to keep her current therapist.  Reviewed rationale for no more than annual mammography for most patients post cancer treatment-more frequent imaging is not been associated with improved outcomes or earlier detection of second cancers or recurrence.  She will continue annual mammography  She continues to get annual cervical cancer screening  per GYN.  She was due for colonoscopy this year but was unable to make this connection due to multiple appointments for cancer diagnosis and treatment.  I encouraged her to get reconnected to her primary care to discuss setting the colonoscopy up as well as reviewing other health maintenance issues including the sciatic nerve pain and fatigue for long-term follow-up      Plan and recommendations:  Referral to physical therapy for additional assistance with fatigue, muscular deconditioning, return to home exercise, eval and treat left sciatic nerve pain  We talked about alternating ice and heat to lower back for symptomatic relief  She will continue to follow-up for lymphedema surveillance  Scheduled labs to be completed at follow-up when she is here for Dr. Zamora in January-vitamin B12, vitamin D, thyroid  She will schedule follow-up with primary care for wellness visit, follow-up on fatigue symptoms, follow-up on sciatic nerve pain symptoms, and make arrangements for screening colonoscopy  She will continue annual mammography per Dr. Mojica,   Continue annual cervical cancer screening per GYN  Continue follow-up with massage therapist at the cancer center  She will try dry needling this week  No formal follow-up arranged for now but have encouraged her to call me should she experience persistent fatigue despite interventions and screening  Call my office as needed at [phone] for additional information, resources or support.      Diagnoses and all orders for this visit:    1. 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 Neoplastic malignant related fatigue  -     Ambulatory Referral to Lymphedema Clinic  -     Vitamin B12; Future  -     Vitamin D 25 Hydroxy; Future  -     TSH; Future  -     T4, Free;  Future    3. Muscular deconditioning  -     Ambulatory Referral to Lymphedema Clinic    4. Sciatic nerve pain, left  -     Ambulatory Referral to Lymphedema Clinic            I spent 40 minutes caring for this patient on this date of service by face-to-face counseling. This time includes time spent by me in the following activities: preparing for the visit, reviewing tests, obtaining and/or reviewing a separately obtained history, performing a medically appropriate examination and/or evaluation, counseling and educating the patient/family/caregiver, ordering medications, tests, or procedures, referring and communicating with other health care professionals, documenting information in the medical record, and care coordination

## 2023-11-16 ENCOUNTER — TELEPHONE (OUTPATIENT)
Dept: OTHER | Facility: HOSPITAL | Age: 63
End: 2023-11-16
Payer: COMMERCIAL

## 2023-11-16 DIAGNOSIS — M54.32 SCIATIC NERVE PAIN, LEFT: Primary | ICD-10-CM

## 2023-11-16 DIAGNOSIS — C50.911 DUCTAL CARCINOMA IN SITU (DCIS) OF RIGHT BREAST WITH MICROINVASIVE COMPONENT: ICD-10-CM

## 2023-11-16 NOTE — TELEPHONE ENCOUNTER
[facility] MULTIDISCIPLINARY CLINIC  SURVIVORSHIP SERVICES CARE COORDINATION NOTE  PHONE      Call received from patient  Follow up regarding left sciatic nerve pain    This was discussed with me in the office 11/13/23. The pain started midway through radiation therapy around July/August this year. The pain is constant. It radiates down the left leg. It is interfering with sleep and  potentiating her post-treatment fatigue as it wakes her frequently through the night. She has tried Aleve, Ibuprofen with no improvement in symptoms. We discussed alternating ice (to reduce inflammation) and heat for symptomatic relief and she was agreeable to physical therapy referral which was placed.     She is traveling to see family next week and is hopeful to not have this hinder her activities and affect sleep further.    Patient was seen by chiropractic therapist this morning for dry needleing for relief of left sciatic nerve pain. They recommended she discuss steroids and muscle relaxer with her team.     We discussed that first line management for acute includes activity modification and physical therapy, and there may be limited benefit in addition of steroid or muscle relaxer. That being said she has been coping with this for about three months.     She has not discussed with her PCP who is new to her. Advised I would review with Dr Zamora and follow up with next steps.

## 2023-11-17 ENCOUNTER — TELEPHONE (OUTPATIENT)
Dept: OTHER | Facility: HOSPITAL | Age: 63
End: 2023-11-17
Payer: COMMERCIAL

## 2023-11-17 RX ORDER — METHYLPREDNISOLONE 4 MG/1
TABLET ORAL
Qty: 21 TABLET | Refills: 0 | Status: SHIPPED | OUTPATIENT
Start: 2023-11-17

## 2023-11-17 RX ORDER — CYCLOBENZAPRINE HCL 10 MG
10 TABLET ORAL 3 TIMES DAILY PRN
Qty: 21 TABLET | Refills: 0 | Status: SHIPPED | OUTPATIENT
Start: 2023-11-17

## 2023-11-17 NOTE — TELEPHONE ENCOUNTER
[facility] MULTIDISCIPLINARY CLINIC  SURVIVORSHIP SERVICES CARE COORDINATION NOTE  PHONE      RE: Left sciatic nerve pain  Patient concerns reviewed with Dr Zamora this morning by phone. Dr Zamora's office will proceed with medrol dose pack and flexeril. Okay to proceed with PT referral.    This is discussed with the patient. Advised patient of above and she will need to follow up with PCP for further evaluation. She expressed understanding

## 2023-12-11 ENCOUNTER — HOSPITAL ENCOUNTER (OUTPATIENT)
Dept: PHYSICAL THERAPY | Facility: HOSPITAL | Age: 63
Setting detail: THERAPIES SERIES
Discharge: HOME | End: 2023-12-11
Payer: COMMERCIAL

## 2023-12-11 DIAGNOSIS — M54.42 CHRONIC BILATERAL LOW BACK PAIN WITH BILATERAL SCIATICA: Primary | ICD-10-CM

## 2023-12-11 DIAGNOSIS — R26.2 DIFFICULTY WALKING: ICD-10-CM

## 2023-12-11 DIAGNOSIS — G89.29 CHRONIC BILATERAL LOW BACK PAIN WITH BILATERAL SCIATICA: Primary | ICD-10-CM

## 2023-12-11 DIAGNOSIS — M54.41 CHRONIC BILATERAL LOW BACK PAIN WITH BILATERAL SCIATICA: Primary | ICD-10-CM

## 2023-12-11 DIAGNOSIS — R53.81 PHYSICAL DECONDITIONING: ICD-10-CM

## 2023-12-11 PROCEDURE — 97110 THERAPEUTIC EXERCISES: CPT

## 2023-12-11 PROCEDURE — 97161 PT EVAL LOW COMPLEX 20 MIN: CPT

## 2023-12-12 NOTE — THERAPY EVALUATION
Outpatient Physical Therapy Ortho Initial Evaluation  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 Chronic bilateral low back pain with bilateral sciatica  M54.42 724.2    M54.41 724.3    G89.29 338.29   2. Difficulty walking  R26.2 719.7   3. Physical deconditioning  R53.81 799.3          Patient History       Row Name 23 1400             History    Chief Complaint Pain  -LB      Type of Pain Back pain;Lower Extremity / Leg  -LB      Date Current Problem(s) Began 23  -LB      Brief Description of Current Complaint Pt reports episode in March of sciatic pain and LBP that started in March, improved, then returned in August after lying on radiation table in awkward position. Pt had steroid dose pack and muscle relaxer with short term improvement. She states pain is worse in AM and improves with movement. It is worse to sit or lie on soft surfaces. She  continues to play tennis but has discontinued [place] classes. She states pain radiates to R posterio calf and occasionally L knee. It is shooting and stabbing in nature and aching at night. She has difficulty changing position in her bed. She has tried chiropractor with short term improvement.  -LB      Previous treatment for THIS PROBLEM Chiropractor  -LB      Patient/Caregiver Goals Relieve pain;Return to prior level of function;Know what to do to help the symptoms;Improve mobility;Improve strength  -LB      Hand Dominance right-handed  -LB      Occupation/sports/leisure activities does not work, plays tennis, [place] class  -LB      Patient seeing anyone else for problem(s)? yes  -LB      How has patient tried to help current problem? avoiding [place] classes  -LB      History of Previous Related Injuries episode of sciatica in March  -LB         Pain     Pain Location Leg;Back  -LB      Pain at Present 2  -LB      Pain at Best 2  -LB      Pain at Worst 8  -LB      Pain Frequency Intermittent  -LB      Pain Description Stabbing;Sharp  -LB      What Performance Factors Make the Current Problem(s) WORSE? sleeping, [place] classes, sitting  -LB      What Performance Factors Make the Current Problem(s) BETTER? movement  -LB      Pain Comments It is better as the day goes on.  -LB      Tolerance Time- Standing reduced pain  -LB      Tolerance Time- Sitting 1 hour  -LB      Tolerance Time- Walking reduced pain  -LB      Tolerance Time- Lying difficulty getting comfortable  -LB      Is your sleep disturbed? Yes  -LB      What position do you sleep in? Right sidelying;Left sidelying  -LB      Difficulties at work? Pt does not work.  -LB      Difficulties with ADL's? Able to perform as day progresses.  -LB      Difficulties with recreational activities? Able to play tennis, pain with [place].  -LB         Fall Risk Assessment    Any falls in the past year: No  -LB         Services    Prior Rehab/Home Health Experiences No  -LB       Are you currently receiving Home Health services No  -LB      Do you plan to receive Home Health services in the near future No  -LB         Daily Activities    Primary Language English  -LB      How does patient learn best? Listening;Reading;Demonstration  -LB      Barriers to learning None  -LB      Pt Participated in POC and Goals Yes  -LB         Safety    Are you being hurt, hit, or frightened by anyone at home or in your life? No  -LB      Are you being neglected by a caregiver No  -LB      Have you had any of the following issues with N/A  -LB                User Key  (r) = Recorded By, (t) = Taken By, (c) = Cosigned By      Initials Name Provider Type    LB Lindsay Kat, PT Physical Therapist                     PT Ortho       Row Name 12/12/23 1200       Subjective Pain    Able to rate subjective pain? yes  -LB    Pre-Treatment Pain Level 2  -LB    Subjective Pain Comment I feel better as the day goes on.  -LB       Posture/Observations    Posture/Observations Comments WNL  -LB       Myotomal Screen- Lower Quarter Clearing    Hip flexion (L2) Bilateral:;4+ (Good +)  -LB    Knee extension (L3) Bilateral:;5 (Normal)  -LB    Ankle DF (L4) Bilateral:;5 (Normal)  -LB    Ankle PF (S1) Bilateral:;4- (Good -)  -LB    Knee flexion (S2) Bilateral:;5 (Normal)  -LB       Lumbar ROM Screen- Lower Quarter Clearing    Lumbar Flexion Normal  -LB    Lumbar Extension Normal  reduced  -LB    Lumbar Lateral Flexion Normal  -LB    Lumbar Rotation Impaired  painful  -LB       SI/Hip Screen- Lower Quarter Clearing    ASIS compression Negative  -LB    ASIS distraction Negative  -LB    Zeeshan's/Gigi's test Negative  -LB    Posterior thigh sheer Negative  -LB    Pain in Rene's area Negative  -LB       General ROM    GENERAL ROM COMMENTS BLE WFL  -LB       MMT (Manual Muscle Testing)    General MMT Comments BLE WFL; difficulty engaging TrA  -LB       Sensation    Sensation WNL? WNL  -LB       Lower Extremity Flexibility     Hamstrings Bilateral:;Mildly limited  -LB    Hip Flexors Bilateral:;Mildly limited  -LB    Hip External Rotators Bilateral:;Moderately limited  -LB    Hip Internal Rotators WNL  -LB       Gait/Stairs (Locomotion)    Scotts Level (Gait) independent  -LB              User Key  (r) = Recorded By, (t) = Taken By, (c) = Cosigned By      Initials Name Provider Type    LB Lindsay Kat, PT Physical Therapist                                Therapy Education  Education Details: progressed HEP to include pelvic floor and TrA stabilization, discussed extension based movement and principle, avoiding flexion, modification of activity, log roll  Given: HEP, Mobility training, Symptoms/condition management, Posture/body mechanics  Program: New, Progressed  How Provided: Verbal, Demonstration, Written  Provided to: Patient  Level of Understanding: Verbalized, Teach back education performed, Demonstrated      PT OP Goals       Row Name 12/12/23 1200          PT Short Term Goals    STG Date to Achieve 12/26/23  -LB     STG 1 Pt will demonstrate understanding and compliance with initial HEP.  -LB     STG 1 Progress New  -LB     STG 2 Pt will report reduced symptoms from B knees to hips or more proximal with extension based principles.  -LB     STG 2 Progress New  -LB     STG 3 Pt will demonstrate appropriate Tra stabilization in standing to allow reduced lumbar stress.  -LB     STG 3 Progress New  -LB        Long Term Goals    LTG Date to Achieve 01/11/24  -LB     LTG 1 Pt will report reduced pain at worse dec from 8/10 to 4/10 or less.  -LB     LTG 1 Progress New  -LB     LTG 2 Pt will report sleeping through the night without waking due to LBP.  -LB     LTG 2 Progress New  -LB     LTG 3 Pt will demonstrate understanding of advanced HEP to allow continued management of condition.  -LB     LTG 3 Progress New  -LB        Time Calculation    PT Goal Re-Cert Due Date 03/11/24  -LB               User Key  (r) = Recorded By, (t) =  Taken By, (c) = Cosigned By      Initials Name Provider Type    LB Lindsay Kat, PT Physical Therapist                     PT Assessment/Plan       Row Name 12/12/23 1212          PT Assessment    Functional Limitations Impaired gait;Limitations in functional capacity and performance;Performance in leisure activities;Performance in sport activities;Limitation in home management  -LB     Impairments Endurance;Gait;Impaired flexibility;Joint mobility;Locomotion;Muscle strength;Pain;Poor body mechanics;Posture;Range of motion;Sensation  -LB     Assessment Comments Pt is 63 y.o. female referred to outpatient physical therapy for evaluation and treatment of  evolving  bilateral LBP with B LE pain.  Patient presents with pain with flexion, pain in AM, difficulty sleeping, dec core strength, muscle guarding in lumbar paraspinals and hip musculature. PMHx consistent with similar episode in March. Personal factors affecting her care include continued participation in weekly tennis, dec core strength, chronic nature of symptoms. Pt demonstrates signs and symptoms  consistent with referring diagnosis.  Pt scored 50% disability on the Modified Oswestry. Pt is limited in their ability to participate in sleeping, [place] class, sitting for long periods of time. She will benefit from continued skilled PT services to address functional deficits. Thank you for this referral.  -LB     Rehab Potential Good  -LB     Patient/caregiver participated in establishment of treatment plan and goals Yes  -LB     Patient would benefit from skilled therapy intervention Yes  -LB        PT Plan    PT Frequency 1x/week;2x/week  -LB     Predicted Duration of Therapy Intervention (PT) 6-8 visits  -LB     Planned CPT's? PT EVAL LOW COMPLEXITY: 83606;PT RE-EVAL: 92563;PT THER ACT EA 15 MIN: 21079;PT THER PROC EA 15 MIN: 41377;PT MANUAL THERAPY EA 15 MIN: 48741;PT NEUROMUSC RE-EDUCATION EA 15 MIN: 34713;PT GAIT TRAINING EA 15 MIN: 72068;PT SELF CARE/HOME  MGMT/TRAIN EA 15: 55103  -LB     PT Plan Comments extension based exercise, review pelvic floor/TrA, consider standing progression, SL clamshell  -LB               User Key  (r) = Recorded By, (t) = Taken By, (c) = Cosigned By      Initials Name Provider Type    LB Lindsay Kat, PT Physical Therapist                       OP Exercises       Row Name 12/12/23 1200             Subjective Pain    Able to rate subjective pain? yes  -LB      Pre-Treatment Pain Level 2  -LB      Subjective Pain Comment I feel better as the day goes on.  -LB         Total Minutes    17590 - PT Therapeutic Exercise Minutes 15  -LB         Exercise 1    Exercise Name 1 seated HS stretch  -LB      Reps 1 3  -LB      Time 1 20  -LB         Exercise 2    Exercise Name 2 supine TrA  -LB      Sets 2 2  -LB      Reps 2 10  -LB      Time 2 5  -LB         Exercise 3    Exercise Name 3 HL pelvic floor contraction  -LB      Sets 3 2  -LB      Reps 3 10  -LB      Time 3 5  -LB         Exercise 4    Exercise Name 4 HL glute set  -LB      Sets 4 2  -LB      Reps 4 10  -LB      Time 4 5  -LB         Exercise 5    Exercise Name 5 piriformis stretch  -LB      Reps 5 3  -LB      Time 5 20  -LB         Exercise 6    Exercise Name 6 standing lumbar extension  -LB      Reps 6 10  -LB         Exercise 7    Exercise Name 7 IVIS  -LB      Reps 7 2  -LB      Time 7 1 min.  -LB                User Key  (r) = Recorded By, (t) = Taken By, (c) = Cosigned By      Initials Name Provider Type    Lindsay Rodriguez, PT Physical Therapist                                  Outcome Measure Options: Modified Oswestry  Modified Oswestry  Modified Oswestry Score/Comments: 50% disability      Time Calculation:     Start Time: 1445  Stop Time: 1530  Time Calculation (min): 45 min  Total Timed Code Minutes- PT: 15 minute(s)  Timed Charges  67798 - PT Therapeutic Exercise Minutes: 15  Total Minutes  Timed Charges Total Minutes: 15   Total Minutes: 15     Therapy Charges for Today        Code Description Service Date Service Provider Modifiers Qty    51702268961 HC PT THER PROC EA 15 MIN 12/11/2023 Lindsay Kat, PT GP 1    90239380016 HC PT EVAL LOW COMPLEXITY 2 12/11/2023 Lindsay Kat, PT GP 1            PT G-Codes  Outcome Measure Options: Modified Oswestry  Modified Oswestry Score/Comments: 50% disability         Lindsay Kat, PT  12/12/2023

## 2023-12-13 ENCOUNTER — HOSPITAL ENCOUNTER (OUTPATIENT)
Dept: PHYSICAL THERAPY | Facility: HOSPITAL | Age: 63
Setting detail: THERAPIES SERIES
Discharge: HOME | End: 2023-12-13
Payer: COMMERCIAL

## 2023-12-13 DIAGNOSIS — R53.81 PHYSICAL DECONDITIONING: ICD-10-CM

## 2023-12-13 DIAGNOSIS — M54.41 CHRONIC BILATERAL LOW BACK PAIN WITH BILATERAL SCIATICA: Primary | ICD-10-CM

## 2023-12-13 DIAGNOSIS — R26.2 DIFFICULTY WALKING: ICD-10-CM

## 2023-12-13 DIAGNOSIS — M54.42 CHRONIC BILATERAL LOW BACK PAIN WITH BILATERAL SCIATICA: Primary | ICD-10-CM

## 2023-12-13 DIAGNOSIS — G89.29 CHRONIC BILATERAL LOW BACK PAIN WITH BILATERAL SCIATICA: Primary | ICD-10-CM

## 2023-12-13 PROCEDURE — 97110 THERAPEUTIC EXERCISES: CPT

## 2023-12-13 NOTE — THERAPY TREATMENT NOTE
Outpatient Physical Therapy Ortho Treatment Note  [facility]     Patient Name: Gaby Dobbins  : 1960  MRN: [id number]  Today's Date: 2023      Visit Date: 2023    Visit Dx:    ICD-10-CM ICD-9-CM   1. Chronic bilateral low back pain with bilateral sciatica  M54.42 724.2    M54.41 724.3    G89.29 338.29   2. Difficulty walking  R26.2 719.7   3. 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     Migraines         Past Surgical History:   Procedure Laterality Date    BLEPHAROPLASTY Left     LOWER    BREAST LUMPECTOMY WITH SENTINEL NODE BIOPSY Right 2023    Procedure: Right needle-localized, bracketed, partial mastectomy and sentinel lymph node biopsy;  Surgeon: Kristin Mojica MD;  Location: [facility];  Service: General;  Laterality: Right;    KIDNEY STONE SURGERY      KNEE MENISCAL REPAIR                          PT Assessment/Plan       Row Name 23 1346          PT Assessment    Assessment Comments Pt returns for follow up reporting good tolerance to HEP and reduced pain today and overnight. Continued extension based focus of treatment today. Pt doing well with TrA and pelvic floor stabilization strengthening. Pt to travel for next 2 weeks and we discussed strategies for management. Added hip bridge and standing TrA today.  -LB        PT Plan    PT Plan Comments response to travel, consider standing hip extension, IVIS on larger mat table, prone extension with manual  blocking  -LB               User Key  (r) = Recorded By, (t) = Taken By, (c) = Cosigned By      Initials Name Provider Type    LB Lindsay Kat, PT Physical Therapist                       OP Exercises       Row Name 12/13/23 1000             Subjective    Subjective Comments I am doing well. I think the extension does reduce the discomfort.  -LB         Subjective Pain    Able to rate subjective pain? yes  -LB      Pre-Treatment Pain Level 0  -LB      Subjective Pain Comment I am doing well today.  -LB         Total Minutes    72118 - PT Therapeutic Exercise Minutes 40  -LB         Exercise 1    Exercise Name 1 seated HS stretch  -LB      Reps 1 3  -LB      Time 1 20  -LB         Exercise 2    Exercise Name 2 supine TrA  -LB      Sets 2 2  -LB      Reps 2 10  -LB      Time 2 5  -LB         Exercise 3    Exercise Name 3 HL pelvic floor contraction  -LB      Sets 3 2  -LB      Reps 3 10  -LB      Time 3 5  -LB         Exercise 4    Exercise Name 4 HL glute set + beginner bridge  -LB      Sets 4 2  -LB      Reps 4 10  -LB      Time 4 5  -LB         Exercise 5    Exercise Name 5 piriformis stretch  -LB      Reps 5 3  -LB      Time 5 20  -LB         Exercise 6    Exercise Name 6 standing lumbar extension  -LB      Reps 6 10  -LB         Exercise 7    Exercise Name 7 --  -LB      Reps 7 --  -LB      Time 7 --  -LB         Exercise 8    Exercise Name 8 SL clamshell  -LB      Sets 8 2  -LB      Reps 8 10  -LB         Exercise 9    Exercise Name 9 tband extension wtih TrA  -LB      Sets 9 2  -LB      Reps 9 10  -LB      Time 9 3  -LB                User Key  (r) = Recorded By, (t) = Taken By, (c) = Cosigned By      Initials Name Provider Type    Lindsay Rodriguez, PT Physical Therapist                                  PT OP Goals       Row Name 12/13/23 1300          PT Short Term Goals    STG Date to Achieve 12/26/23  -LB     STG 1 Pt will demonstrate understanding and compliance with initial HEP.  -LB     STG 1 Progress  Met  -LB     STG 2 Pt will report reduced symptoms from B knees to hips or more proximal with extension based principles.  -LB     STG 2 Progress Ongoing  -LB     STG 3 Pt will demonstrate appropriate Tra stabilization in standing to allow reduced lumbar stress.  -LB     STG 3 Progress Ongoing  -LB        Long Term Goals    LTG Date to Achieve 01/11/24  -LB     LTG 1 Pt will report reduced pain at worse dec from 8/10 to 4/10 or less.  -LB     LTG 1 Progress Ongoing  -LB     LTG 2 Pt will report sleeping through the night without waking due to LBP.  -LB     LTG 2 Progress Ongoing  -LB     LTG 3 Pt will demonstrate understanding of advanced HEP to allow continued management of condition.  -LB     LTG 3 Progress Ongoing  -LB               User Key  (r) = Recorded By, (t) = Taken By, (c) = Cosigned By      Initials Name Provider Type    Lindsay Rodriguez PT Physical Therapist                    Therapy Education  Education Details: reviewed HEP, discussed extension vs. flexion, strategies for travel, tennis playing  Given: HEP, Mobility training, Symptoms/condition management, Posture/body mechanics  Program: Reinforced  How Provided: Verbal  Provided to: Patient  Level of Understanding: Verbalized              Time Calculation:   Start Time: 1000  Stop Time: 1045  Time Calculation (min): 45 min  Total Timed Code Minutes- PT: 40 minute(s)  Timed Charges  88584 - PT Therapeutic Exercise Minutes: 40  Total Minutes  Timed Charges Total Minutes: 40   Total Minutes: 40  Therapy Charges for Today       Code Description Service Date Service Provider Modifiers Qty    17177470701 HC PT THER PROC EA 15 MIN 12/13/2023 Lindsay Kat PT GP 3                      Lindsay Kat PT  12/13/2023

## 2024-01-05 ENCOUNTER — HOSPITAL ENCOUNTER (OUTPATIENT)
Dept: PHYSICAL THERAPY | Facility: HOSPITAL | Age: 64
Setting detail: THERAPIES SERIES
Discharge: HOME | End: 2024-01-05
Payer: COMMERCIAL

## 2024-01-05 DIAGNOSIS — Z98.890 S/P LUMPECTOMY, RIGHT BREAST: ICD-10-CM

## 2024-01-05 DIAGNOSIS — R26.2 DIFFICULTY WALKING: ICD-10-CM

## 2024-01-05 DIAGNOSIS — M54.42 CHRONIC BILATERAL LOW BACK PAIN WITH BILATERAL SCIATICA: Primary | ICD-10-CM

## 2024-01-05 DIAGNOSIS — C50.911 DUCTAL CARCINOMA IN SITU (DCIS) OF RIGHT BREAST WITH MICROINVASIVE COMPONENT: ICD-10-CM

## 2024-01-05 DIAGNOSIS — Z91.89 AT RISK FOR LYMPHEDEMA: ICD-10-CM

## 2024-01-05 DIAGNOSIS — G89.29 CHRONIC BILATERAL LOW BACK PAIN WITH BILATERAL SCIATICA: Primary | ICD-10-CM

## 2024-01-05 DIAGNOSIS — R53.81 PHYSICAL DECONDITIONING: ICD-10-CM

## 2024-01-05 DIAGNOSIS — M54.41 CHRONIC BILATERAL LOW BACK PAIN WITH BILATERAL SCIATICA: Primary | ICD-10-CM

## 2024-01-05 PROCEDURE — 97110 THERAPEUTIC EXERCISES: CPT

## 2024-01-05 NOTE — THERAPY TREATMENT NOTE
Outpatient Physical Therapy Ortho Treatment Note  [facility]     Patient Name: Gaby Dobbins  : 1960  MRN: [id number]  Today's Date: 2024      Visit Date: 2024    Visit Dx:    ICD-10-CM ICD-9-CM   1. Chronic bilateral low back pain with bilateral sciatica  M54.42 724.2    M54.41 724.3    G89.29 338.29   2. Difficulty walking  R26.2 719.7   3. Physical deconditioning  R53.81 799.3   4. At risk for lymphedema  Z91.89 V49.89   5. 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     Migraines         Past Surgical History:   Procedure Laterality Date    BLEPHAROPLASTY Left     LOWER    BREAST LUMPECTOMY WITH SENTINEL NODE BIOPSY Right 2023    Procedure: Right needle-localized, bracketed, partial mastectomy and sentinel lymph node biopsy;  Surgeon: Kristin Mojica MD;  Location: [facility];  Service: General;  Laterality: Right;    KIDNEY STONE SURGERY      KNEE MENISCAL REPAIR                          PT Assessment/Plan       Row Name 24 1251          PT Assessment    Assessment Comments Pt reporting fluctuating symptoms but overall slow improvement in condition. Extension based treatment is effective but pt continues to have inc pain with sleeping and attempting to change positions or get out of bed. Progressed functional core strengthening today with good tolerance.  Discussed treatment plan and trajectory, encouraged strict compliance for next few weeks to determine if PT is enough to restore pt's previous level of function.  -LB        PT Plan    PT Plan Comments assess response to last session, consider standing hip extension, hip bridge on ball, SB PNF  -LB               User Key  (r) = Recorded By, (t) = Taken By, (c) = Cosigned By      Initials Name Provider Type    LB Lindsay Kat, PT Physical Therapist                       OP Exercises       Row Name 01/05/24 1200             Subjective    Subjective Comments I am doing better but still not great. Night is my worst time. I do feel really good today and I was able to get out of bed without requiring stretches.  -LB         Subjective Pain    Able to rate subjective pain? yes  -LB      Pre-Treatment Pain Level 0  -LB         Total Minutes    44015 - PT Therapeutic Exercise Minutes 40  -LB         Exercise 1    Exercise Name 1 seated piriformis stretch  -LB      Reps 1 3  -LB      Time 1 20  -LB         Exercise 2    Exercise Name 2 prone press up  -LB      Sets 2 2  -LB      Reps 2 10  -LB      Time 2 slight PT blocking  -LB         Exercise 3    Exercise Name 3 prone hip extension  -LB      Sets 3 2  -LB      Reps 3 10  -LB         Exercise 4    Exercise Name 4 seated SB march  -LB      Sets 4 2  -LB      Reps 4 20  -LB         Exercise 5    Exercise Name 5 SB wall squat  -LB      Sets 5 2  -LB      Reps 5 10  -LB         Exercise 6    Exercise Name 6 lateral walking tband  -LB      Reps 6 3 laps  -LB      Time 6 GTB  -LB         Exercise 7    Exercise Name 7 retro walking  -LB      Reps 7 3 laps  -LB      Time 7 GTB  -LB         Exercise 8    Exercise Name 8 AR press  -LB      Sets 8 2  -LB      Reps 8 10  -LB      Time 8 GTB  -LB                User Key  (r) = Recorded By, (t) = Taken By, (c) = Cosigned By      Initials Name Provider Type    Lindsay Rodriguez, PT Physical Therapist                                  PT  OP Goals       Row Name 01/05/24 1200          PT Short Term Goals    STG Date to Achieve 12/26/23  -LB     STG 1 Pt will demonstrate understanding and compliance with initial HEP.  -LB     STG 1 Progress Met  -LB     STG 2 Pt will report reduced symptoms from B knees to hips or more proximal with extension based principles.  -LB     STG 2 Progress Ongoing  -LB     STG 2 Progress Comments worst pain in R hip  -LB     STG 3 Pt will demonstrate appropriate Tra stabilization in standing to allow reduced lumbar stress.  -LB     STG 3 Progress Met  -LB        Long Term Goals    LTG Date to Achieve 01/11/24  -LB     LTG 1 Pt will report reduced pain at worse dec from 8/10 to 4/10 or less.  -LB     LTG 1 Progress Ongoing  -LB     LTG 2 Pt will report sleeping through the night without waking due to LBP.  -LB     LTG 2 Progress Ongoing  -LB     LTG 3 Pt will demonstrate understanding of advanced HEP to allow continued management of condition.  -LB     LTG 3 Progress Met  -LB               User Key  (r) = Recorded By, (t) = Taken By, (c) = Cosigned By      Initials Name Provider Type    Lindsay Rodriguez PT Physical Therapist                    Therapy Education  Education Details: progressed HEP, discussed continued management  Given: HEP, Mobility training, Symptoms/condition management, Posture/body mechanics  Program: Reinforced  How Provided: Verbal  Provided to: Patient  Level of Understanding: Verbalized              Time Calculation:   Start Time: 1130  Stop Time: 1215  Time Calculation (min): 45 min  Total Timed Code Minutes- PT: 40 minute(s)  Timed Charges  77331 - PT Therapeutic Exercise Minutes: 40  Total Minutes  Timed Charges Total Minutes: 40   Total Minutes: 40  Therapy Charges for Today       Code Description Service Date Service Provider Modifiers Qty    96400757583 HC PT THER PROC EA 15 MIN 1/5/2024 Lindsay Kat PT GP 3                      Lindsay Kat PT  1/5/2024

## 2024-01-08 ENCOUNTER — APPOINTMENT (OUTPATIENT)
Dept: PHYSICAL THERAPY | Facility: HOSPITAL | Age: 64
End: 2024-01-08
Payer: COMMERCIAL

## 2024-01-10 ENCOUNTER — TELEPHONE (OUTPATIENT)
Dept: ONCOLOGY | Facility: CLINIC | Age: 64
End: 2024-01-10
Payer: COMMERCIAL

## 2024-01-10 NOTE — TELEPHONE ENCOUNTER
"  Caller: Gaby Dobbins \"Sabra\"    Relationship: Self    Best call back number: [phone]    What is the best time to reach you: ANY    Who are you requesting to speak with (clinical staff, provider,  specific staff member): SCHEDULING        What was the call regarding: PATIENT CALLED TO 1/12/24. PATIENT'S MOTHER PASSED AWAY AND SABRA WILL BE BACK ON 1/23/24. SABRA REQUESTS TO R/S TO THE 25TH OR 26TH OF JANUARY    Is it okay if the provider responds through MyChart: NO          "

## 2024-01-10 NOTE — TELEPHONE ENCOUNTER
Spoke with pt and rescheduled appt due to patient going out of town. Pt confirmed new date and time for appt.

## 2024-01-11 ENCOUNTER — APPOINTMENT (OUTPATIENT)
Dept: PHYSICAL THERAPY | Facility: HOSPITAL | Age: 64
End: 2024-01-11
Payer: COMMERCIAL

## 2024-01-15 ENCOUNTER — APPOINTMENT (OUTPATIENT)
Dept: PHYSICAL THERAPY | Facility: HOSPITAL | Age: 64
End: 2024-01-15
Payer: COMMERCIAL

## 2024-01-26 ENCOUNTER — HOSPITAL ENCOUNTER (OUTPATIENT)
Dept: PHYSICAL THERAPY | Facility: HOSPITAL | Age: 64
Setting detail: THERAPIES SERIES
Discharge: HOME | End: 2024-01-26
Payer: COMMERCIAL

## 2024-01-26 ENCOUNTER — TELEPHONE (OUTPATIENT)
Dept: SURGERY | Facility: CLINIC | Age: 64
End: 2024-01-26
Payer: COMMERCIAL

## 2024-01-26 ENCOUNTER — OFFICE VISIT (OUTPATIENT)
Dept: ONCOLOGY | Facility: CLINIC | Age: 64
End: 2024-01-26
Payer: COMMERCIAL

## 2024-01-26 VITALS
WEIGHT: 162 LBS | HEART RATE: 84 BPM | HEIGHT: 62 IN | BODY MASS INDEX: 29.81 KG/M2 | OXYGEN SATURATION: 97 % | TEMPERATURE: 98.1 F | SYSTOLIC BLOOD PRESSURE: 147 MMHG | DIASTOLIC BLOOD PRESSURE: 69 MMHG | RESPIRATION RATE: 18 BRPM

## 2024-01-26 DIAGNOSIS — M54.41 CHRONIC BILATERAL LOW BACK PAIN WITH BILATERAL SCIATICA: Primary | ICD-10-CM

## 2024-01-26 DIAGNOSIS — M54.32 SCIATIC NERVE PAIN, LEFT: ICD-10-CM

## 2024-01-26 DIAGNOSIS — C50.911 DUCTAL CARCINOMA IN SITU (DCIS) OF RIGHT BREAST WITH MICROINVASIVE COMPONENT: ICD-10-CM

## 2024-01-26 DIAGNOSIS — R53.81 PHYSICAL DECONDITIONING: ICD-10-CM

## 2024-01-26 DIAGNOSIS — M54.42 CHRONIC BILATERAL LOW BACK PAIN WITH BILATERAL SCIATICA: Primary | ICD-10-CM

## 2024-01-26 DIAGNOSIS — G89.29 CHRONIC BILATERAL LOW BACK PAIN WITH BILATERAL SCIATICA: Primary | ICD-10-CM

## 2024-01-26 DIAGNOSIS — R26.2 DIFFICULTY WALKING: ICD-10-CM

## 2024-01-26 PROCEDURE — 99214 OFFICE O/P EST MOD 30 MIN: CPT | Performed by: INTERNAL MEDICINE

## 2024-01-26 PROCEDURE — 97110 THERAPEUTIC EXERCISES: CPT

## 2024-01-26 NOTE — TELEPHONE ENCOUNTER
Spoke to pt and let her know that her last mammogram was 03/30 and if I do it before that then insurance wont cover it     Pt stated understanding   \we are going to leave appointments as is for right now     I told her too call if there was a better week for her

## 2024-01-26 NOTE — TELEPHONE ENCOUNTER
----- Message from Chantale Restrepo MA sent at 1/26/2024 12:22 PM EST -----  Patient lives part time in [place] , and in Miko's note it says follow up with screening MMG in March.   She has planned time home in March and can not do April. Here MMG @ [facility] is 4/2/24 & follow up with Miko 4/9/24 she wants to know if she can get these moved up to march? She will be avilable the whole week of march 18th you can call her.    Thanks :)

## 2024-01-26 NOTE — THERAPY PROGRESS REPORT/RE-CERT
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    Neoplastic malignant related fatigue    Sciatic nerve pain, left    Muscular deconditioning        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 Chronic bilateral low back pain with bilateral sciatica  M54.42 724.2    M54.41 724.3    G89.29 338.29   2. Difficulty walking  R26.2 719.7   3. Physical deconditioning  R53.81 799.3                             Therapy Education  Education Details: reviewed HEP, POC, discussed referral for further imaging, possible EMILEE  Given: HEP, Mobility training, Symptoms/condition management, Posture/body mechanics  Program: Reinforced  How Provided: Verbal  Provided to: Patient  Level of Understanding: Verbalized      PT OP Goals       Row Name 24 1300          PT Short Term Goals    STG Date to Achieve 23  -LB     STG 1 Pt will demonstrate understanding and compliance with initial HEP.  -LB     STG 1 Progress Met  -LB     STG 2 Pt will report reduced symptoms from B  knees to hips or more proximal with extension based principles.  -LB     STG 2 Progress Ongoing  -LB     STG 3 Pt will demonstrate appropriate Tra stabilization in standing to allow reduced lumbar stress.  -LB     STG 3 Progress Met  -LB        Long Term Goals    LTG Date to Achieve 01/11/24  -LB     LTG 1 Pt will report reduced pain at worse dec from 8/10 to 4/10 or less.  -LB     LTG 1 Progress Ongoing  -LB     LTG 1 Progress Comments pain at worse 6/10  -LB     LTG 2 Pt will report sleeping through the night without waking due to LBP.  -LB     LTG 2 Progress Ongoing  -LB     LTG 2 Progress Comments continued pain with turning, initial stand  -LB     LTG 3 Pt will demonstrate understanding of advanced HEP to allow continued management of condition.  -LB     LTG 3 Progress Met  -LB               User Key  (r) = Recorded By, (t) = Taken By, (c) = Cosigned By      Initials Name Provider Type    LB Lindsay Kat, PT Physical Therapist                     PT Assessment/Plan       Row Name 01/26/24 1346          PT Assessment    Functional Limitations Impaired gait;Limitations in functional capacity and performance;Performance in leisure activities;Performance in sport activities;Limitation in home management  -LB     Impairments Endurance;Gait;Impaired flexibility;Joint mobility;Locomotion;Muscle strength;Pain;Poor body mechanics;Posture;Range of motion;Sensation  -LB     Assessment Comments Pt has participated in 4 skilled PT sessions for RLE and occasional LLE pain radiating to B posterior gastroc. Pt is making slow progress towards goals in extension based programming and continued core stabilization exercises. She has met 2/3 STGs and 1/3 LTGs. Treatment has focused on lumbar extension, core stabilization with dynamic movement and reducing strength imbalances R to L. Pt has made overall fair progress but continues to have pain with bed mobility, inc activity, and initial movement especially in AM. SHe remains  appropriate for continued skilled PT services to address deficits and continue to work towards remaining goals.  -LB     Rehab Potential Good  -LB     Patient/caregiver participated in establishment of treatment plan and goals Yes  -LB     Patient would benefit from skilled therapy intervention Yes  -LB        PT Plan    PT Frequency 1x/week  -LB     Predicted Duration of Therapy Intervention (PT) 4-6 visits  -LB     Planned CPT's? PT EVAL LOW COMPLEXITY: 29240;PT RE-EVAL: 19853;PT THER PROC EA 15 MIN: 81480;PT THER ACT EA 15 MIN: 05919;PT MANUAL THERAPY EA 15 MIN: 12772;PT SELF CARE/HOME MGMT/TRAIN EA 15: 82629;PT NEUROMUSC RE-EDUCATION EA 15 MIN: 87818  -LB     PT Plan Comments Reassess following DDN, possible EMILEE?, continued strengthening for several weeks while in [place].  -LB               User Key  (r) = Recorded By, (t) = Taken By, (c) = Cosigned By      Initials Name Provider Type    Lindsay Rodriguez PT Physical Therapist                       OP Exercises       Row Name 01/26/24 1300             Subjective    Subjective Comments I am feeling good today. I got DDN yesterday and feel a lot better today.  -LB         Subjective Pain    Able to rate subjective pain? yes  -LB      Pre-Treatment Pain Level 0  -LB         Total Minutes    86832 - PT Therapeutic Exercise Minutes 40  -LB         Exercise 1    Exercise Name 1 Nustep  -LB      Time 1 3 minutes  -LB         Exercise 2    Exercise Name 2 seated SB march  -LB      Sets 2 2  -LB      Reps 2 20  -LB         Exercise 3    Exercise Name 3 seated SB with UE star  -LB      Sets 3 2  -LB      Reps 3 10  -LB      Time 3 GTB  -LB         Exercise 4    Exercise Name 4 lunge with trunk rotation  -LB      Sets 4 2  -LB      Reps 4 10  -LB         Exercise 5    Exercise Name 5 bird dog opposite  -LB      Sets 5 2  -LB      Reps 5 10  -LB                User Key  (r) = Recorded By, (t) = Taken By, (c) = Cosigned By      Initials Name Provider Type    Lindsay Rodriguez, PT  Physical Therapist                                            Time Calculation:     Start Time: 0830  Stop Time: 0915  Time Calculation (min): 45 min  Total Timed Code Minutes- PT: 40 minute(s)  Timed Charges  18501 - PT Therapeutic Exercise Minutes: 40  Total Minutes  Timed Charges Total Minutes: 40   Total Minutes: 40     Therapy Charges for Today       Code Description Service Date Service Provider Modifiers Qty    94843527789 HC PT THER PROC EA 15 MIN 1/26/2024 Lindsay Kat, PT GP 3                      Lindsay Kat, PT  1/26/2024

## 2024-01-26 NOTE — PROGRESS NOTES
Subjective   Gaby Dobbins is a 63 y.o. female. Referred by Dr. Mojica for microinvasive carcinoma of the right breast.    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  She denies palpating any abnormal masses in either breast, skin or nipple changes prior to this.    No family history of breast or ovarian carcinoma.    3/20/2023-bilateral screening mammogram  Calcifications in the right breast require additional evaluation.  Magnification is recommended.    Right breast diagnostic mammogram  New calcifications in the right breast are suspicious.  Stereotactic biopsy is recommended.    5/15/2023-right breast stereoactic biopsy  Pathology consistent with high-grade solid ductal carcinoma in situ with rare foci of irregular ductal clusters.  Highly suspicious for invasive carcinoma.  DCIS-high-grade, size 3 mm  Microcalcifications are present in DCIS  DCIS is ER negative  OR negative  Ki-67 88.66%    Pathology reviewed at [facility]  Right breast 8:00 stereotactic biopsy consistent with microinvasive carcinoma 0.9 mm arising out of a background of high-grade ductal carcinoma in situ with a brisk lymphocytic response.  ER negative, OR negative    Invitae 47 gene panel negative    5/19/2023-bilateral breast MRI-3.4 cm biopsy cavity at 7:00 in the posterior right breast represents biopsy-proven malignancy.  No suspicious enhancement is identified at the biopsy site and the biopsy clip is laterally displaced.  Surgical management is recommended.  No MRI evidence of malignancy in the left breast.     6/19/2023-right breast lumpectomy  High-grade ductal carcinoma in situ measuring 12 mm  Margins are negative for in situ carcinoma  2 sentinel lymph nodes negative  ER negative  OR negative  HER2 1+    pT1 MI N0 M0    Patient has met with radiation oncology and plans to start radiation  2023.    Completed adjuvant radiation.    Interval history  While she was going through radiation she developed back pain which she rates a 6 out of 10 and the back pain is radiating to the hips.  She thinks that this started due to the way she was positioned on the table.  She has been working with physical therapy but the back pain has not improved.  She is requesting a referral to pain management.  No other complaints at this time  No new breast masses  Screening mammogram has been ordered.      The following portions of the patient's history were reviewed and updated as appropriate: allergies, current medications, past family history, past medical history, past social history, past surgical history, and problem list.    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     Migraines         Past Surgical History:   Procedure Laterality Date    BLEPHAROPLASTY Left     LOWER    BREAST LUMPECTOMY WITH SENTINEL NODE BIOPSY Right 2023    Procedure: Right needle-localized, bracketed, partial mastectomy and sentinel lymph node biopsy;  Surgeon: Kristin Mojica MD;  Location: [facility];  Service: General;  Laterality: Right;    KIDNEY STONE SURGERY      KNEE MENISCAL REPAIR          Family History   Problem Relation Age of Onset    Hypertension Father     Malig Hyperthermia Neg Hx         Social History     Socioeconomic History    Marital status:     Number of children: 2   Tobacco Use    Smoking status: Never     Passive exposure: Never    Smokeless tobacco: Never   Vaping Use    Vaping Use: Never used   Substance and Sexual Activity    Alcohol use: Yes     Comment: SOCIAL    Drug use: Never    Sexual activity: Defer        OB History    No obstetric history on file.      Age at menarche-14  Age at first live childbirth-32   2 para 2  0  Breast-feeding-6 to 8 weeks  Age at menopause-in her  "early 50s  Oral conceptive pill use for greater than 25 years  No use of hormone replacement    Allergies   Allergen Reactions    Moxifloxacin Hives            Review of Systems   Constitutional: Negative.    HENT: Negative.     Eyes: Negative.    Respiratory: Negative.     Cardiovascular: Negative.    Gastrointestinal: Negative.    Endocrine: Negative.    Genitourinary: Negative.  Positive for amenorrhea.   Musculoskeletal: Negative.    Skin: Negative.    Allergic/Immunologic: Negative.    Neurological: Negative.    Hematological: Negative.    Psychiatric/Behavioral:  The patient is nervous/anxious.      Review of systems as mentioned HPI otherwise negative    Objective   Blood pressure 147/69, pulse 84, temperature 98.1 °F (36.7 °C), temperature source Temporal, resp. rate 18, height 157.5 cm (62.01\"), weight 73.5 kg (162 lb), SpO2 97%.   Physical Exam  Constitutional:       Appearance: Normal appearance. She is normal weight.   HENT:      Head: Normocephalic and atraumatic.      Right Ear: External ear normal.      Left Ear: External ear normal.      Nose: Nose normal.      Mouth/Throat:      Pharynx: Oropharynx is clear.   Eyes:      Conjunctiva/sclera: Conjunctivae normal.   Cardiovascular:      Rate and Rhythm: Normal rate.   Pulmonary:      Effort: Pulmonary effort is normal.   Abdominal:      General: Abdomen is flat.   Musculoskeletal:         General: Normal range of motion.      Cervical back: Normal range of motion.   Skin:     General: Skin is warm.   Neurological:      General: No focal deficit present.      Mental Status: She is alert and oriented to person, place, and time.   Psychiatric:         Mood and Affect: Mood normal.         Behavior: Behavior normal.         Thought Content: Thought content normal.         Judgment: Judgment normal.       Breast exam-left breast appears normal on inspection.  No palpable abnormalities of the left breast  Right breast on inspection there is a well-healed " scar in the lower outer quadrant as well as in the right axilla.  Underlying scar tissue but no other palpable abnormalities.  The right breast tissue is somewhat more firm likely due to radiation    No visits with results within 30 Day(s) from this visit.   Latest known visit with results is:   Hospital Outpatient Visit on 08/21/2023   Component Date Value Ref Range Status    Course ID 08/21/2023 C1: R [id number]   Final    Course Intent 08/21/2023 Curative   Final    Course Start Date 08/21/2023 7/18/2023  9:51 AM   Final    Course First Treatment Date 08/21/2023 7/25/2023  1:35 PM   Final    Course Last Treatment Date 08/21/2023 8/21/2023  8:19 AM   Final    Course Elapsed Days 08/21/2023 27   Final    Reference Point ID 08/21/2023 RX: Rt Brst Jose E   Final    Reference Point Dosage Given to Da* 08/21/2023 8  Gy Final    Reference Point Session Dosage Giv* 08/21/2023 2  Gy Final    Plan ID 08/21/2023 Rt Brst Boost   Final    Plan Fractions Treated to Date 08/21/2023 4   Final    Plan Total Fractions Prescribed 08/21/2023 4   Final    Plan Prescribed Dose Per Fraction 08/21/2023 2  Gy Final    Plan Total Prescribed Dose 08/21/2023 800  cGy Final    Plan Primary Reference Point 08/21/2023 RX: Rt Brst Jose E   Final    Course ID 08/21/2023 C1: R [id number]   Final    Course Intent 08/21/2023 Curative   Final    Course Start Date 08/21/2023 7/18/2023  9:51 AM   Final    Course End Date 08/21/2023 8/21/2023  9:10 AM   Final    Course First Treatment Date 08/21/2023 7/25/2023  1:35 PM   Final    Course Last Treatment Date 08/21/2023 8/21/2023  8:19 AM   Final    Course Elapsed Days 08/21/2023 27   Final    Reference Point ID 08/21/2023 RX: Rt Breast   Final    Reference Point Dosage Given to Da* 08/21/2023 42.56  Gy Final    Reference Point ID 08/21/2023 RX: Rt Brst Jose E   Final    Reference Point Dosage Given to Da* 08/21/2023 8  Gy Final    Plan ID 08/21/2023 Rt Breast   Final    Plan Name 08/21/2023 Rt Breast   Final     Plan Fractions Treated to Date 08/21/2023 16   Final    Plan Total Fractions Prescribed 08/21/2023 16   Final    Plan Prescribed Dose Per Fraction 08/21/2023 2.66  Gy Final    Plan Total Prescribed Dose 08/21/2023 4,256  cGy Final    Plan Primary Reference Point 08/21/2023 RX: Rt Breast   Final    Plan ID 08/21/2023 Rt Brst Boost   Final    Plan Name 08/21/2023 Rt Brst Boost   Final    Plan Fractions Treated to Date 08/21/2023 4   Final    Plan Total Fractions Prescribed 08/21/2023 4   Final    Plan Prescribed Dose Per Fraction 08/21/2023 2  Gy Final    Plan Total Prescribed Dose 08/21/2023 800  cGy Final    Plan Primary Reference Point 08/21/2023 RX: Rt Brst Jose E   Final        No radiology results for the last 30 days.       Assessment & Plan       *Right breast microinvasive ductal carcinoma and DCIS  PT 1 MI N0 M0, tumor is triple negative with a Ki-67 of over 80%, anatomic stage Ia and prognostic stage Ia.  Tumor was arising in a background of high-grade ductal carcinoma in situ   Status post right lumpectomy on 6/19/2023, margins negative, 2 sentinel lymph nodes negative  Completed adjuvant radiation to the right breast  No indication for endocrine therapy given triple negative nature of disease.  Continues with surveillance alone    *Back pain  New since the start of radiation  Limiting her physical activity  Continue physical therapy  Referral to pain management will be made today    *Surveillance  Bilateral screening mammogram due March 2024, ordered and scheduled    *Follow-up-6 months

## 2024-01-29 ENCOUNTER — TELEPHONE (OUTPATIENT)
Dept: ONCOLOGY | Facility: CLINIC | Age: 64
End: 2024-01-29
Payer: COMMERCIAL

## 2024-01-29 DIAGNOSIS — G89.29 CHRONIC MIDLINE LOW BACK PAIN WITHOUT SCIATICA: Primary | ICD-10-CM

## 2024-01-29 DIAGNOSIS — M54.50 CHRONIC MIDLINE LOW BACK PAIN WITHOUT SCIATICA: Primary | ICD-10-CM

## 2024-01-29 NOTE — TELEPHONE ENCOUNTER
Contacted Sabra by phone to discuss pain management referral.  Adivsed her we received a message back from the Pain Management practice that they requested an MRI lumbar spine to be completed prior to visit.  This is ordered and notified her scheduling will call to make appt.

## 2024-02-19 NOTE — TELEPHONE ENCOUNTER
"    Caller: Gaby Dobbins \"Sabra\"    Relationship: Self    Best call back number: [phone]     Requested Prescriptions:   Requested Prescriptions     Pending Prescriptions Disp Refills    methylPREDNISolone (MEDROL) 4 MG dose pack 21 tablet 0     Sig: Take as directed on package instructions.    cyclobenzaprine (FLEXERIL) 10 MG tablet 21 tablet 0     Sig: Take 1 tablet by mouth 3 (Three) Times a Day As Needed for Muscle Spasms.        Pharmacy where request should be sent: [facility] [place] [facility]- [place] [id number] [facility] [address], PHONE# is [phone]    Last office visit with prescribing clinician: 1/26/2024   Last telemedicine visit with prescribing clinician: Visit date not found   Next office visit with prescribing clinician: 1/22/2025     Additional details provided by patient: PT IS IN [place] FOR HER MOTHER'S [facility] SERVICE, SHE HAVING BAD BACK PAIN AND SCIATICA, SHE IS ASKING FOR THE SAME STEROID PACK AND MUSCLE RELAXERS THAT SHE RECEIVED IN NOVEMBER TO BE SENT TO THE [facility] IN [place], [place].  PLEASE CALL PATIENT TO ADVISE WHEN THIS HAS BEEN SENT.      Does the patient have less than a 3 day supply:  [x] Yes  [] No    Would you like a call back once the refill request has been completed: [x] Yes [] No    If the office needs to give you a call back, can they leave a voicemail: [x] Yes [] No    Enid Veras Rep   02/19/24 11:03 EST           "

## 2024-02-20 RX ORDER — CYCLOBENZAPRINE HCL 10 MG
10 TABLET ORAL 3 TIMES DAILY PRN
Qty: 21 TABLET | Refills: 0 | OUTPATIENT
Start: 2024-02-20

## 2024-02-20 RX ORDER — METHYLPREDNISOLONE 4 MG/1
TABLET ORAL
Qty: 21 TABLET | Refills: 0 | OUTPATIENT
Start: 2024-02-20

## 2024-02-20 NOTE — TELEPHONE ENCOUNTER
"  Caller: Gaby Dobbins \"Sabra\"    Relationship: Self    Best call back number:     [phone]     What is the best time to reach you: ANYTIME    Who are you requesting to speak with (clinical staff, provider,  specific staff member): CLINICAL    What was the call regarding: PT IS CALLING BACK TO CHECK ON THE STATUS OF THIS REFILL? PT IS HAVING A LOT OF PAIN.  PLEASE ADVISE.     Is it okay if the provider responds through Stam[employer]: PLEASE CALL BACK TO ADVISE.           "

## 2024-02-20 NOTE — TELEPHONE ENCOUNTER
Called the patient to let her know that she would need to be seen by UC or call her pcp and she was upset but v/u.

## 2024-03-07 ENCOUNTER — HOSPITAL ENCOUNTER (OUTPATIENT)
Facility: HOSPITAL | Age: 64
Discharge: HOME | End: 2024-03-07
Admitting: INTERNAL MEDICINE
Payer: COMMERCIAL

## 2024-03-07 DIAGNOSIS — G89.29 CHRONIC MIDLINE LOW BACK PAIN WITHOUT SCIATICA: ICD-10-CM

## 2024-03-07 DIAGNOSIS — M54.50 CHRONIC MIDLINE LOW BACK PAIN WITHOUT SCIATICA: ICD-10-CM

## 2024-03-07 PROCEDURE — A9577 INJ MULTIHANCE: HCPCS | Performed by: INTERNAL MEDICINE

## 2024-03-07 PROCEDURE — 0 GADOBENATE DIMEGLUMINE 529 MG/ML SOLUTION: Performed by: INTERNAL MEDICINE

## 2024-03-07 PROCEDURE — 72158 MRI LUMBAR SPINE W/O & W/DYE: CPT

## 2024-03-07 RX ADMIN — GADOBENATE DIMEGLUMINE 15 ML: 529 INJECTION, SOLUTION INTRAVENOUS at 17:25

## 2024-03-11 NOTE — PROGRESS NOTES
Please let patient know that the MRI shows degenerative changes.  She should be ready to schedule her appointment with pain medicine now that we have the MRI.

## 2024-03-12 ENCOUNTER — TELEPHONE (OUTPATIENT)
Dept: ONCOLOGY | Facility: CLINIC | Age: 64
End: 2024-03-12
Payer: COMMERCIAL

## 2024-03-12 NOTE — TELEPHONE ENCOUNTER
Reviewed Dr Zamora's note with Sabra by phone. Advised her I have notified our medical records department to alert the pain management group that she is ready to schedule appt with them.

## 2024-03-12 NOTE — TELEPHONE ENCOUNTER
----- Message from Justine Zamora MD sent at 3/11/2024  9:03 AM EDT -----  Please let patient know that the MRI shows degenerative changes.  She should be ready to schedule her appointment with pain medicine now that we have the MRI.

## 2024-03-19 ENCOUNTER — TELEPHONE (OUTPATIENT)
Dept: PAIN MEDICINE | Facility: CLINIC | Age: 64
End: 2024-03-19
Payer: COMMERCIAL

## 2024-03-20 ENCOUNTER — OFFICE VISIT (OUTPATIENT)
Dept: PAIN MEDICINE | Facility: CLINIC | Age: 64
End: 2024-03-20
Payer: COMMERCIAL

## 2024-03-20 VITALS
HEART RATE: 93 BPM | HEIGHT: 62 IN | TEMPERATURE: 96.8 F | WEIGHT: 163.6 LBS | BODY MASS INDEX: 30.11 KG/M2 | RESPIRATION RATE: 12 BRPM | OXYGEN SATURATION: 97 % | DIASTOLIC BLOOD PRESSURE: 68 MMHG | SYSTOLIC BLOOD PRESSURE: 117 MMHG

## 2024-03-20 DIAGNOSIS — M51.36 DDD (DEGENERATIVE DISC DISEASE), LUMBAR: ICD-10-CM

## 2024-03-20 DIAGNOSIS — M47.816 LUMBAR FACET ARTHROPATHY: ICD-10-CM

## 2024-03-20 DIAGNOSIS — G57.01 PIRIFORMIS SYNDROME OF RIGHT SIDE: Primary | ICD-10-CM

## 2024-03-20 NOTE — PROGRESS NOTES
"CHIEF COMPLAINT  Right sided low back pain goes down her right leg onset since 8-2023 after having radiation.    Subjective   Gaby Dobbins is a 63 y.o. female.   She presents to the office for evaluation of back pain and right leg pain. She was referred here by Dr. Zamora.    Ms. Dobbins's pain started during her radiation treatments for breast cancer. She states that her markers were off which resulted in having to lay in an odd position for several treatments. She states that she has now had this pain since Aug/Sept on 2023.     Today her pain is 4/10VAS in severity. She describes her pain as bilateral low back pain which alternates between the right and left side of her back. Her pain is continuous \"tight\" pain that fluctuates in severity in her low back with numbness that radiated into the posterior/lateral aspect of her right leg to the ankle. She noted some numbness in her left leg, but this was not as severe.  Her pain is worsened by lying down, bending, and lifting; it is improved by ice, heat, stretching, massage gun. She states that her numbness in her right leg resolved ~2 weeks ago spontaneously.     Originally her pain was 50% back, 50% leg pain. She now state her primary complaint is her back pain.     She continues to participate in HEP prescribed by PT.     MDP in November. Unsure if this was helpful.     Past pain medications: Flexeril (mild relief, no side effects), Gabapentin (was prescribed as part of her cancer treatment, not for her leg pain, did not take this regularly).      Current pain medications: OTC aleve     Past therapies:  Physical Therapy: Yes, decreases her pain, but did not resolve her issue.   Chiropractor: Yes, temporarily helpful  Massage Therapy: Yes, temporarily helpful  Dry Needling: Yes, temporarily helpful  TENS: Yes, temporarily helpful  Neck or back surgery: None  Past pain management: None     Previous Injections:   None    Back Pain  This is a chronic problem. " The current episode started more than 1 month ago. The problem occurs constantly. The problem has been improved since onset. The pain radiates to the right thigh, left thigh, left buttock and right buttock (right lateral/posterior calf). The pain is at a severity of 4/10. The symptoms are aggravated by bending and lying down (lifting). Associated symptoms include numbness (right leg) and weakness (right leg). Pertinent negatives include no abdominal pain, chest pain, dysuria, fever or headaches. She has tried heat, ice, muscle relaxant, NSAIDs, chiropractic manipulation and home exercises (PT) for the symptoms.      PEG Assessment   What number best describes your pain on average in the past week?5  What number best describes how, during the past week, pain has interfered with your enjoyment of life?5  What number best describes how, during the past week, pain has interfered with your general activity?  5      Current Outpatient Medications:     cetirizine (zyrTEC) 10 MG tablet, Take 1 tablet by mouth Daily As Needed for Allergies., Disp: , Rfl:     Cholecalciferol (Vitamin D3) 25 MCG (1000 UT) capsule, , Disp: , Rfl:     fexofenadine-pseudoephedrine (ALLEGRA-D 24) 180-240 MG per 24 hr tablet, Take 1 tablet by mouth Daily., Disp: 90 tablet, Rfl: 1    MAGNESIUM PO, Take  by mouth. HOLD FOR SURGERY, Disp: , Rfl:     Multiple Vitamin (MULTI VITAMIN) tablet, Take  by mouth. HOLD FOR SURGERY, Disp: , Rfl:     SUMAtriptan-naproxen (TREXIMET)  MG per tablet, TAKE ONE TABLET BY MOUTH AT ONSET OF HEADACHE. MAY REPEAT AFTER TWO HOURS. MAX TWO DOSES DAILY (Patient taking differently: TAKE ONE TABLET BY MOUTH AT ONSET OF HEADACHE. MAY REPEAT AFTER TWO HOURS. MAX TWO DOSES DAILY HOLD PER MD INSTRUCTIONS PRIOR TO SURGERY), Disp: 9 tablet, Rfl: 11    cyclobenzaprine (FLEXERIL) 10 MG tablet, Take 1 tablet by mouth 3 (Three) Times a Day As Needed for Muscle Spasms., Disp: 21 tablet, Rfl: 0    gabapentin (Neurontin) 100 MG  capsule, Take 1 capsule by mouth 3 (Three) Times a Day. (Patient not taking: Reported on 3/20/2024), Disp: 90 capsule, Rfl: 0    methylPREDNISolone (MEDROL) 4 MG dose pack, Take as directed on package instructions., Disp: 21 tablet, Rfl: 0    The following portions of the patient's history were reviewed and updated as appropriate: allergies, current medications, past family history, past medical history, past social history, past surgical history, and problem list.    REVIEW OF PERTINENT MEDICAL DATA    Office visit from 1/26/2024 with Dr. Zamora reviewed.  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  While going through radiation she developed back pain rating as 6/10 VAS radiating to the hips.  She has been working with physical therapy without improvement.  Requesting referral to pain management.    Narrative & Impression   MRI EXAMINATION OF THE LUMBAR SPINE WITH AND WITHOUT CONTRAST     HISTORY: Back pain, bilateral radiculopathy, more prominent on the  right. Breast cancer.     COMPARISON: None.     FINDINGS: There is a mild grade 1 anterolisthesis of L4 upon L5  estimated to be 2-3 mm. There is mild loss of disc height and disc  desiccation from L1 to L5. Mild dextroscoliosis is appreciated with the  apex at the level of L5. The conus is at T12-L1 and the caudal aspect of  the spinal cord appears unremarkable.     Note is made of an ovoid area of T2 hyperintensity posterolateral to the  abdominal aorta at the level of L2. It measures approximately 2.7 x 2.0  x 2.2 cm in the craniocaudal, AP and transverse dimensions. There is no  evidence of associated enhancement.     L1-L2: A mild broad-based disc osteophyte complex is present with no  evidence of herniation.     L2-L3: A mild broad-based disc osteophyte complex is present resulting  in mild flattening of the ventral surface of the thecal sac. Mild facet  degenerative disease is present bilaterally. Mild foraminal  stenosis is  present on the left secondary to extension of a small disc osteophyte  complex into the neural foramen. Mild endplate degenerative changes are  present posterolaterally to the left.     L3-L4: Moderate facet degenerative disease is present bilaterally. A  broad-based disc osteophyte complex is present resulting in mild  flattening of the ventral surface of the thecal sac. Mild foraminal  stenosis is present bilaterally secondary to extension of a small disc  osteophyte complex into the neural foramen.     L4-L5: Moderate to severe facet degenerative disease is present  bilaterally with mild associated edema. A broad-based disc osteophyte  complex is present which results in mild flattening of the ventral  surface of the thecal sac. Mild foraminal stenosis is present  bilaterally secondary to extension of a small disc osteophyte complex  into the neural foramen.     L5-S1: Moderate facet degenerative disease is present bilaterally. Note  is made of transitional anatomy consisting of partial sacralization of  L5.     After contrast administration there was no evidence of abnormal  enhancement.     IMPRESSION:  1.  Note is made of transitional anatomy. For the purposes of this  dictation, the transitional vertebral body is being considered to be a  partially sacralized L5 segment. Careful correlation with all imaging is  required prior to any intervention given the presence of transitional  anatomy.  2.  There is no evidence of a focal herniation. Multilevel degenerative  disease involving the lumbar spine is noted as described above including  moderate to severe facet degenerative disease bilaterally at L4-L5.  There is mild edema appreciated involving the facets bilaterally at  L4-L5. There is mild loss of disc height and disc desiccation from L1 to  L5 and a mild grade 1 anterolisthesis of L5 upon S1. See above.  3.  There is a nonenhancing T2 hyperintense and somewhat cystic  structure identified  "posterolateral to the abdominal aorta at the level  of L2 measuring approximately 2.7 x 2.0 x 2.2 cm in size. The appearance  is nonspecific. This likely represents a cyst. A cystic or necrotic node  cannot be entirely excluded but is thought to be less likely. Comparison  with prior cross-sectional imaging studies is recommended.     This report was finalized on 3/8/2024 11:04 AM by Dr. Rodolfo Quevedo M.D  on Workstation: [id number]     7/25/2023:  Platelets-185    6/12/2023:  Creatinine-0.75    Review of Systems   Constitutional:  Negative for activity change (limited), fatigue and fever.   HENT:  Negative for congestion.    Respiratory:  Negative for cough and chest tightness.    Cardiovascular:  Negative for chest pain.   Gastrointestinal:  Negative for abdominal pain, constipation and diarrhea.   Genitourinary:  Negative for difficulty urinating and dysuria.   Musculoskeletal:  Positive for back pain.   Neurological:  Positive for weakness (right leg) and numbness (right leg). Negative for dizziness, light-headedness and headaches.   Psychiatric/Behavioral:  Positive for agitation and sleep disturbance (pain). Negative for suicidal ideas. The patient is nervous/anxious.      --  The aforementioned information the Chief Complaint section and above subjective data including any HPI data, and also the Review of Systems data, has been personally reviewed and affirmed.  --    Vitals:    03/20/24 0901   BP: 117/68   BP Location: Left arm   Patient Position: Sitting   Cuff Size: Large Adult   Pulse: 93   Resp: 12   Temp: 96.8 °F (36 °C)   TempSrc: Temporal   SpO2: 97%   Weight: 74.2 kg (163 lb 9.6 oz)   Height: 157.5 cm (62.01\")   PainSc:   4     Objective   Physical Exam  Vitals and nursing note reviewed.   Constitutional:       Appearance: Normal appearance. She is well-developed.   Eyes:      General: Lids are normal.   Cardiovascular:      Rate and Rhythm: Normal rate.   Pulmonary:      Effort: Pulmonary effort is " normal.   Musculoskeletal:      Lumbar back: Bony tenderness present. Normal range of motion. Negative right straight leg raise test and negative left straight leg raise test.        Legs:       Comments:   -Luis Zeeshan  -Luis Thigh Thrust  -Luis Gaenslen's  -Luis SI Distraction  -Luis SI Compression  +Lumbar facet loading     Neurological:      Mental Status: She is alert and oriented to person, place, and time.   Psychiatric:         Attention and Perception: Attention normal.         Mood and Affect: Mood normal.         Speech: Speech normal.         Behavior: Behavior normal.         Judgment: Judgment normal.       Assessment & Plan   Diagnoses and all orders for this visit:    1. Piriformis syndrome of right side (Primary)    2. DDD (degenerative disc disease), lumbar    3. Lumbar facet arthropathy      --- I spent 48 minutes caring for Gaby on this date of service. This time includes time spent by me in the following activities: preparing for the visit, reviewing tests, obtaining and/or reviewing a separately obtained history, performing a medically appropriate examination and/or evaluation, counseling and educating the patient/family/caregiver, and documenting information in the medical record     --- Gaby Dobbins reports a pain score of 4.  Given her pain assessment as noted, treatment options were discussed and the following options were decided upon as a follow-up plan to address the patient's pain:  discussion of optional injections .    --- If right lower extremity numbness returns we will schedule a right piriformis injection with Dr. Tan. Right now her right leg symptoms are currently improved so we will hold off on injections.   --- Secondary plan if no relief with the piriformis injection will be a LESI at L4/5 vs. L5/S1  --- If her axial low back pain increases reviewed that Lumbar MBB/RFA is an option for her in the future.   --- Follow-up if pain returns/worsens.      ROHINI REPORT    As  the clinician, I personally reviewed the ROHINI from 3/20/2024 while the patient was in the office today.    Dictated utilizing Dragon dictation.

## 2024-04-01 ENCOUNTER — HOSPITAL ENCOUNTER (OUTPATIENT)
Dept: PHYSICAL THERAPY | Facility: HOSPITAL | Age: 64
Setting detail: THERAPIES SERIES
Discharge: HOME | End: 2024-04-01
Payer: COMMERCIAL

## 2024-04-01 DIAGNOSIS — R26.2 DIFFICULTY WALKING: ICD-10-CM

## 2024-04-01 DIAGNOSIS — G89.29 CHRONIC BILATERAL LOW BACK PAIN WITH BILATERAL SCIATICA: Primary | ICD-10-CM

## 2024-04-01 DIAGNOSIS — M54.42 CHRONIC BILATERAL LOW BACK PAIN WITH BILATERAL SCIATICA: Primary | ICD-10-CM

## 2024-04-01 DIAGNOSIS — M54.41 CHRONIC BILATERAL LOW BACK PAIN WITH BILATERAL SCIATICA: Primary | ICD-10-CM

## 2024-04-01 DIAGNOSIS — R53.81 PHYSICAL DECONDITIONING: ICD-10-CM

## 2024-04-01 PROCEDURE — 97535 SELF CARE MNGMENT TRAINING: CPT

## 2024-04-01 PROCEDURE — 97110 THERAPEUTIC EXERCISES: CPT

## 2024-04-02 ENCOUNTER — APPOINTMENT (OUTPATIENT)
Dept: WOMENS IMAGING | Facility: HOSPITAL | Age: 64
End: 2024-04-02
Payer: COMMERCIAL

## 2024-04-02 PROCEDURE — 77067 SCR MAMMO BI INCL CAD: CPT | Performed by: RADIOLOGY

## 2024-04-02 PROCEDURE — 77063 BREAST TOMOSYNTHESIS BI: CPT | Performed by: RADIOLOGY

## 2024-04-02 NOTE — THERAPY PROGRESS REPORT/RE-CERT
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    Neoplastic malignant related fatigue    Sciatic nerve pain, left    Muscular deconditioning    Piriformis syndrome of right side        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 Chronic bilateral low back pain with bilateral sciatica  M54.42 724.2    M54.41 724.3    G89.29 338.29   2. Difficulty walking  R26.2 719.7   3. Physical deconditioning  R53.81 799.3                             Therapy Education  Education Details: lengthy review of HEP, discussed POC, goals of exercise, benefits of pilates, continued walking, focus of core/glute stabilization  Given: HEP, Mobility training, Symptoms/condition management, Posture/body mechanics  Program: Reinforced, Progressed, Modified  How Provided: Demonstration, Written, Verbal  Provided to: Patient  Level of Understanding: Teach back education performed, Verbalized, Demonstrated  66622 - PT Self Care/Mgmt Minutes: 30      PT OP Goals       Row Name 24 0800           PT Short Term Goals    STG Date to Achieve 12/26/23  -LB     STG 1 Pt will demonstrate understanding and compliance with initial HEP.  -LB     STG 1 Progress Met  -LB     STG 2 Pt will report reduced symptoms from B knees to hips or more proximal with extension based principles.  -LB     STG 2 Progress Met  -LB     STG 3 Pt will demonstrate appropriate Tra stabilization in standing to allow reduced lumbar stress.  -LB     STG 3 Progress Met  -LB        Long Term Goals    LTG Date to Achieve 01/11/24  -LB     LTG 1 Pt will report reduced pain at worse dec from 8/10 to 4/10 or less.  -LB     LTG 1 Progress Met  -LB     LTG 2 Pt will report sleeping through the night without waking due to LBP.  -LB     LTG 2 Progress Met  -LB     LTG 3 Pt will demonstrate understanding of advanced HEP to allow continued management of condition.  -LB     LTG 3 Progress Met  -LB               User Key  (r) = Recorded By, (t) = Taken By, (c) = Cosigned By      Initials Name Provider Type    LB Lindsay Kat, PT Physical Therapist                     PT Assessment/Plan       Row Name 04/02/24 0804          PT Assessment    Functional Limitations Limitations in community activities;Limitations in functional capacity and performance;Performance in sport activities  -LB     Impairments Impaired flexibility;Pain;Muscle strength  -LB     Assessment Comments Pt returns after being in [place] for last few months. She reports dramatic improvement in symptoms without pain for last 2-3 weeks. She is able to sleep through the night and denies pain with bed mobility, initial STS, prolonged walking/activity. She has continued to play tennis and is tolerating well. She has not returned to [place] or yoga. She has performed HEP consistently. We discussed progression of exercises and focus on body mechanics, equal WBing in stance and with STS, and appropriate progression of exercise to allow return to full sport. Suggested beginner pilates to focus on  core stabilization and flexibility and build return to [place] as appropriate. We reviewed and modified HEP to allow continued management of condition. Pt will return as needed depending on return/improvement in condition over next few weeks.  -LB     Rehab Potential Good  -LB     Patient/caregiver participated in establishment of treatment plan and goals Yes  -LB     Patient would benefit from skilled therapy intervention Yes  -LB        PT Plan    PT Frequency Other (comment)  -LB     Predicted Duration of Therapy Intervention (PT) return as needed for 2-4 visits  -LB     Planned CPT's? PT EVAL LOW COMPLEXITY: 21917;PT RE-EVAL: 90050;PT THER PROC EA 15 MIN: 29104;PT THER ACT EA 15 MIN: 16003;PT MANUAL THERAPY EA 15 MIN: 38993;PT NEUROMUSC RE-EDUCATION EA 15 MIN: 22794;PT SELF CARE/HOME MGMT/TRAIN EA 15: 59695;PT BIS XTRACELL FLUID ANALYSIS: 73651  -LB     PT Plan Comments return as needed to progress/modify HEP  -LB               User Key  (r) = Recorded By, (t) = Taken By, (c) = Cosigned By      Initials Name Provider Type    Lindsay Rodriguez PT Physical Therapist                       OP Exercises       Row Name 04/02/24 0800             Subjective    Subjective Comments I am doing so much better. I haven't had pain for 2 weeks.  -LB         Subjective Pain    Able to rate subjective pain? yes  -LB      Pre-Treatment Pain Level 0  -LB         Total Minutes    25579 - PT Therapeutic Exercise Minutes 15  -LB         Exercise 1    Exercise Name 1 reviewed HEP and discussed all progressions of appropriate exercises  -LB      Time 1 15 minutes  -LB                User Key  (r) = Recorded By, (t) = Taken By, (c) = Cosigned By      Initials Name Provider Type    Lindsay Rodriguez PT Physical Therapist                                            Time Calculation:     Start Time: 1400  Stop Time: 1445  Time Calculation (min): 45 min  Total Timed Code Minutes- PT: 40 minute(s)  Timed Charges  47147 - PT Therapeutic Exercise  Minutes: 15  20618 - PT Self Care/Mgmt Minutes: 30  Total Minutes  Timed Charges Total Minutes: 30   Total Minutes: 30     Therapy Charges for Today       Code Description Service Date Service Provider Modifiers Qty    13759187184 HC PT SELF CARE/MGMT/TRAIN EA 15 MIN 4/1/2024 Lindsay Kat, PT GP 2    49477377122 HC PT THER PROC EA 15 MIN 4/1/2024 Lindsay Kat, PT GP 1                      Lindsay Kat, PT  4/2/2024

## 2024-04-07 NOTE — PROGRESS NOTES
[facility]     Referring Provider: Graciela Angulo MD     Chief complaint: Rt breast cancer follow up     Subjective   HPI:   5/31/2023:  Ms. Gaby Dobbins is a 64 yo woman, seen at the request of Dr. Graciela Angulo, for a new diagnosis of right breast cancer. This was initially detected as an imaging abnormality on routine screening. Her work-up is detailed in the oncologic history below. Prior to the biopsy, she denies any breast lumps, pain, skin changes, or nipple discharge. She denies any prior history of abnormal mammograms or breast biopsies. Her most recent mammogram prior to this year was in 2016. She denies any family history of breast or ovarian cancer.     7/5/2023  She underwent right partial mastectomy and SLNB on 6/19/23. See surgery & pathology details below in oncologic history. She has been recovering well and has no complaints.  She is very interested in alternative medicine options and wants me to sign off on receiving high-dose IV vitamin C from a wellness center.  She was joined today in clinic by her .      10/3/2023 Interval History  Pt presenting to the office today for routine follow up.  She completely radiation in August. She saw oncology in July, it was not recommended to have adjuvant endocrine therapy or chemotherapy due to triple negative status. She has no new breast complaints or concerns today. SHe saw PT in July with a normal bio impedence score.     4/8/2024 interval history  Patient presenting to the office today for routine follow-up.  On 4/2/2024 she had a bilateral screening mammogram that resulted as BI-RADS 2.  She last saw oncology in January with no changes made to the treatment plan.  She has no new breast complaints or concerns today.    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  There is a stable very small, oval mass measuring 3 mm seen in the sub areolar region of the left breast. No suspicious masses, suspicious microcalcifications or new areas of architectural distortion are identified. There has been no significant change from the prior exam(s).  In the right breast, no suspicious masses, significant calcifications or other abnormalities are seen.  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  There are calcifications with linear distribution seen in the posterior one-third central region of the right breast.  In the left breast, no suspicious masses, significant calcifications or other abnormalities are seen.  BI-RADS 0: Incomplete     5/1/2023 Imaging    Right Diagnostic MMG with [facility] ([facility])  On the present examination, there are multiple new indistinct calcifications with linear distribution in the posterior one third of the right breast at 8 o'clock, central located 8 cm from the nipple. This spans approximately 2 cm. Additional imaging demonstrates microcalcifications are suspicious and tissue sampling is recommended.  BI-RADS 4B: Suspicious     5/15/2023 Biopsy    Right Breast, Stereotactic Biopsy ([facility]):    Right Breast, 8:00, stereotactic core biopsies   High grade solid ductal carcinoma in situ (DCIS) with rare foci of irregular ductal clusters, Highly suspicious for invasive carcinoma.     Ductal carcinoma in situ (DCIS)    Nuclear grade: high    Architectural pattern(s): solid    Necrosis: not identified    Size: 3mm   Microcalcifications: present in DCIS    ER negative (0%)  AR negative (0%)  Ki-67 88.66%  Her2 negative (IHC 1+)    -Top hat clip.  A 2 view postprocedure mammogram demonstrated the marker clip to have migrated approximately 2 cm lateral on the cc view  from the expected biopsy site and a small hematoma.  Residual calcifications are present.    [facility] PATHOLOGY REVIEW    1. Right Breast, 8:00, Stereotactic-Guided Core Needle Biopsy:               A. MICROINVASIVE CARCINOMA, 0.9 MM , ARISING OUT OF A BACKGROUND OF HIGH GRADE                   DUCTAL CARCINOMA IN SITU (DCIS) WITH A BRISK LYMPHOCYTIC RESPONSE, Solid type with       single cell necrosis and calcifications.               B. Received immunostains performed at the outside institution shows the following from blocks A, B, and D with the following results (all controls reacted appropriately).                            P63:  Highlights an intact myoepithelial cell layer around the in-situ carcinoma with focal loss in 1D [facility]:  Highlights an intact myoepithelial cell layer around the in-situ carcinoma with focal loss in 1D               C. Repeat P63 immunostain as well as cytokeratin immunostain performed on unstained slides provided for block D shows the following (all controls reacted appropriately):                            P63: Intact myoepithelial cell layer in the area of in situ carcinoma and loss in the area of                            microinvasive carcinoma                            AE1/AE3: Highlights the irregularity of malignant cell groups               D. See comment.    Comment:  We agree with the outside diagnosis rendered. Per outside report, hormone receptor studies were performed on block B to show the following:      ER:  Negative (0)  OH:  Negative (0)  Ki67:  88.66%     HER2 immunohistochemistry has been requested to be performed at the original institution on block D. Results to follow and be scanned separately into the patient's electronic medical record.      5/19/2023 Imaging    Bilateral Breast MRI ([facility]):  RIGHT BREAST:    At 7:00 in the posterior right breast, 7 cm posterior to the nipple, there is a 2.2 cm AP dimension, 3.4 cm transverse dimension,  1.0 cm craniocaudal dimension T2 hyperintense biopsy cavity with thin peripheral enhancement. There is a focus of susceptibility from a biopsy clip within the lateral aspect of the cavity, which was noted to be laterally displaced on the post biopsy mammogram. Additional susceptibility within the cavity likely reflects residual air from recent biopsy. No suspicious enhancement is identified at the biopsy site or elsewhere within the right breast.  No suspicious enhancement is identified in the right nipple or chest wall.   The visualized axilla is within normal limits.   LEFT BREAST:    No suspicious enhancing mass or area of non-mass enhancement is identified.  The visualized axilla is within normal limits.   EXTRAMAMMARY FINDINGS:   There are no pathologically enlarged internal mammary chain lymph nodes on either side.     BI-RADS 6: Known malignancy.     5/31/2023 Genetic Testing    [employer] Common Hereditary Cancers Panel (47 genes):    Negative     6/1/2023 Imaging    Right Diagnostic MMG with Javier ([facility]):  There is a top hat clip in the outer central posterior right breast from recent biopsy, which is located within the lateral aspect of an approximately 3.5 cm transverse oriented linear biopsy tract.  Approximately 1.6 cm faint residual calcifications are identified within the medial aspect of the tract and slightly medial and anterior to the tract on the CC view. Additional calcifications were previously noted  extending approximately 0.9 cm lateral to the most posterolateral calcification seen on the current examination, which were previously biopsied. This area has been marked and should be included at the time of excision.  The biopsy site, including the clip and calcifications, were difficult to include in the field-of-view on lateral and MLO spot magnification views due to the posterior location. Residual calcifications are  visualized predominantly posterior to the top hat clip on the lateral spot  magnification view and predominantly inferior to the clip on the full field MLO view. Differences in positioning of the clip relative to the residual calcifications is likely due to differences in patient positioning. Residual calcifications measure approximately 1.2 cm on the MLO view.  BI-RADS 6: Known malignancy.     6/19/2023 Surgery    Right needle-localized, bracketed, partial mastectomy and sentinel lymph node biopsy    1. Right Breast, Double Needle-Localized Partial Mastectomy (39 Grams):               A. HIGH GRADE DUCTAL CARCINOMA IN-SITU (DCIS):                            1. Solid type with calcifications.                            2. Extent of DCIS:  12 mm (based on slices involved).                            3. Margins are negative for in-situ carcinoma; Closest distance:  DCIS is present 2.5 mm from the inferior margin (superseded by specimen #3).               B. Clip and biopsy site changes are present and adjacent to in-situ carcinoma.               C. See synoptic report (to include parts 2-5) and Comments.     2. Right Breast, Additional Anterior, Superior and Medial Margins, Re-excision:               A. Breast parenchyma with no significant histopathologic changes       (additional 6 mm of tissue free of carcinoma).     3. Right Breast, Additional Inferior and Lateral Margins, Re-excision:               A. Breast parenchyma with focal biopsy site changes                    (additional 16 mm of tissue free of carcinoma).      4. Right Axillary [place] Lymph Node #1, Hot and Blue, Count 3,600, Biopsy:                 A. One lymph node, negative for carcinoma (0/1).     5. Right Axillary [place] Lymph Node #2, Hot and Blue, Count 1,000, Biopsy:                 A. One lymph node, negative for carcinoma (0/1).     7/25/2023 - 8/21/2023 Radiation    Radiation OncologyTreatment Course:  Gaby Dobbins received 5056 cGy in 20 fractions to right breast with tumor bed boost     4/2/2024 Imaging     Bilateral screening mammogram at [facility]    There are scattered areas of fibroglandular density.    Finding 1:  There is a post-surgical scar seen in the superficial central region of the right breast. Post-surgical  scar is in an area of prior lumpectomy.  Findings are consistent with post surgical and post radiation therapy  changes.  This is new since prior exam(s).    Finding 2:  There is a new post-surgical scar in the right axilla. Post-surgical scar is in an area of prior  sentinel node biopsy.  Metallic surgical clips noted.    In the left breast, no suspicious masses, significant calcifications or other abnormalities are seen.    IMPRESSION:  Finding 1:  Post-surgical scar in the superficial central region of the right breast is benign-negative.    Finding 2:  New post-surgical scar in the right axilla is benign-negative.    Screening mammogram in 1 year is recommended  BI-RADS Category 2: Benign Finding(s           Review of Systems:  See interval history.       Medications:    Current Outpatient Medications:     cetirizine (zyrTEC) 10 MG tablet, Take 1 tablet by mouth Daily As Needed for Allergies., Disp: , Rfl:     Cholecalciferol (Vitamin D3) 25 MCG (1000 UT) capsule, , Disp: , Rfl:     fexofenadine-pseudoephedrine (ALLEGRA-D 24) 180-240 MG per 24 hr tablet, Take 1 tablet by mouth Daily., Disp: 90 tablet, Rfl: 1    gabapentin (Neurontin) 100 MG capsule, Take 1 capsule by mouth 3 (Three) Times a Day. (Patient not taking: Reported on 3/20/2024), Disp: 90 capsule, Rfl: 0    MAGNESIUM PO, Take  by mouth. HOLD FOR SURGERY, Disp: , Rfl:     Multiple Vitamin (MULTI VITAMIN) tablet, Take  by mouth. HOLD FOR SURGERY, Disp: , Rfl:     SUMAtriptan-naproxen (TREXIMET)  MG per tablet, TAKE ONE TABLET BY MOUTH AT ONSET OF HEADACHE. MAY REPEAT AFTER TWO HOURS. MAX TWO DOSES DAILY (Patient taking differently: TAKE ONE TABLET BY MOUTH AT ONSET OF HEADACHE. MAY REPEAT AFTER TWO HOURS. MAX TWO DOSES DAILY HOLD PER MD  INSTRUCTIONS PRIOR TO SURGERY), Disp: 9 tablet, Rfl: 11      Allergies   Allergen Reactions    Moxifloxacin Hives       Family History   Problem Relation Age of Onset    Hypertension Father     Malig Hyperthermia Neg Hx        Objective   PHYSICAL EXAMINATION:   There were no vitals filed for this visit.      ECOG 0 - Asymptomatic  General: NAD, well appearing  Psych: a&o x3, normal mood and affect  Eyes: EOMI, no scleral icterus  ENMT: neck supple without masses or thyromegaly, mucous membranes moist  MSK: normal gait, normal ROM in bilateral shoulders  Lymph nodes: Well-healing right axillary incision with Dermabond intact.  No swelling.  Breast: asymmetric, moderate size, grade 3 ptosis R<L  Right: Well-healed lower outer radial incision.  No masses or nipple abnormalities. Breast is slightly pink and smaller from radiation. Slight edema 3:00-6:00  Left: No visible abnormalities on inspection while seated, with arms raised or hands on hips.  No masses, skin changes or nipple abnormalities      Assessment & Plan   Assessment:  63 y.o. F with a diagnosis of right breast cancer: Microinvasive carcinoma (0.9 mm) arising in a background of high-grade ductal carcinoma in situ (DCIS), ER/[place] negative, Her2 negative. She underwent right partial mastectomy and SLNB on 6/19/23, with no residual invasive disease in the surgical specimen only DCIS, [id number], anatomic stage IA, prognostic stage IA.       Plan:  -cont with Dr. Zamora  -cont with Dr. Thomson  -I reassured her that she has an excellent prognosis, especially if she follows traditional medicine recommendations. I have asked her to hold off on any alternative medicine treatments until she sees the above doctors.  -cont with Lymphedema clinic  -exam in 6 [facility] for support    KELLY Herman      CC:  MD Kira Squires APRN Mollie Cartwright, MD

## 2024-04-08 ENCOUNTER — OFFICE VISIT (OUTPATIENT)
Dept: SURGERY | Facility: CLINIC | Age: 64
End: 2024-04-08
Payer: COMMERCIAL

## 2024-04-08 VITALS
SYSTOLIC BLOOD PRESSURE: 124 MMHG | BODY MASS INDEX: 30.18 KG/M2 | HEART RATE: 106 BPM | OXYGEN SATURATION: 97 % | HEIGHT: 62 IN | DIASTOLIC BLOOD PRESSURE: 68 MMHG | WEIGHT: 164 LBS

## 2024-04-08 DIAGNOSIS — C50.911 DUCTAL CARCINOMA IN SITU (DCIS) OF RIGHT BREAST WITH MICROINVASIVE COMPONENT: Primary | ICD-10-CM

## 2024-04-08 PROCEDURE — 99213 OFFICE O/P EST LOW 20 MIN: CPT | Performed by: NURSE PRACTITIONER

## 2024-05-06 ENCOUNTER — OFFICE VISIT (OUTPATIENT)
Dept: INTERNAL MEDICINE | Facility: CLINIC | Age: 64
End: 2024-05-06
Payer: COMMERCIAL

## 2024-05-06 VITALS
WEIGHT: 167.2 LBS | HEART RATE: 93 BPM | TEMPERATURE: 98.4 F | HEIGHT: 62 IN | BODY MASS INDEX: 30.77 KG/M2 | SYSTOLIC BLOOD PRESSURE: 143 MMHG | DIASTOLIC BLOOD PRESSURE: 100 MMHG | RESPIRATION RATE: 12 BRPM | OXYGEN SATURATION: 97 %

## 2024-05-06 DIAGNOSIS — Z12.11 SCREENING FOR COLON CANCER: ICD-10-CM

## 2024-05-06 DIAGNOSIS — G43.009 MIGRAINE WITHOUT AURA AND WITHOUT STATUS MIGRAINOSUS, NOT INTRACTABLE: Primary | ICD-10-CM

## 2024-05-06 PROCEDURE — 99213 OFFICE O/P EST LOW 20 MIN: CPT | Performed by: NURSE PRACTITIONER

## 2024-05-06 RX ORDER — SUMATRIPTAN AND NAPROXEN SODIUM 85; 500 MG/1; MG/1
TABLET, FILM COATED ORAL
Qty: 9 TABLET | Refills: 11 | Status: SHIPPED | OUTPATIENT
Start: 2024-05-06

## 2024-08-01 ENCOUNTER — OFFICE VISIT (OUTPATIENT)
Dept: ONCOLOGY | Facility: CLINIC | Age: 64
End: 2024-08-01
Payer: COMMERCIAL

## 2024-08-01 ENCOUNTER — OFFICE VISIT (OUTPATIENT)
Dept: LAB | Facility: HOSPITAL | Age: 64
End: 2024-08-01
Payer: COMMERCIAL

## 2024-08-01 ENCOUNTER — LAB (OUTPATIENT)
Dept: LAB | Facility: HOSPITAL | Age: 64
End: 2024-08-01
Payer: COMMERCIAL

## 2024-08-01 VITALS
WEIGHT: 166.6 LBS | TEMPERATURE: 98 F | BODY MASS INDEX: 30.66 KG/M2 | SYSTOLIC BLOOD PRESSURE: 122 MMHG | DIASTOLIC BLOOD PRESSURE: 84 MMHG | HEART RATE: 92 BPM | OXYGEN SATURATION: 97 % | HEIGHT: 62 IN

## 2024-08-01 DIAGNOSIS — R53.0 NEOPLASTIC MALIGNANT RELATED FATIGUE: ICD-10-CM

## 2024-08-01 DIAGNOSIS — C50.911 DUCTAL CARCINOMA IN SITU (DCIS) OF RIGHT BREAST WITH MICROINVASIVE COMPONENT: Primary | ICD-10-CM

## 2024-08-01 DIAGNOSIS — C50.911 DUCTAL CARCINOMA IN SITU (DCIS) OF RIGHT BREAST WITH MICROINVASIVE COMPONENT: ICD-10-CM

## 2024-08-01 LAB
25(OH)D3 SERPL-MCNC: 103 NG/ML (ref 30–100)
ALBUMIN SERPL-MCNC: 4.3 G/DL (ref 3.5–5.2)
ALBUMIN/GLOB SERPL: 1.5 G/DL
ALP SERPL-CCNC: 91 U/L (ref 39–117)
ALT SERPL W P-5'-P-CCNC: 13 U/L (ref 1–33)
ANION GAP SERPL CALCULATED.3IONS-SCNC: 12.4 MMOL/L (ref 5–15)
AST SERPL-CCNC: 31 U/L (ref 1–32)
BASOPHILS # BLD AUTO: 0.04 10*3/MM3 (ref 0–0.2)
BASOPHILS NFR BLD AUTO: 0.7 % (ref 0–1.5)
BILIRUB SERPL-MCNC: 0.3 MG/DL (ref 0–1.2)
BUN SERPL-MCNC: 20 MG/DL (ref 8–23)
BUN/CREAT SERPL: 23.5 (ref 7–25)
CALCIUM SPEC-SCNC: 9.5 MG/DL (ref 8.6–10.5)
CHLORIDE SERPL-SCNC: 104 MMOL/L (ref 98–107)
CO2 SERPL-SCNC: 27.6 MMOL/L (ref 22–29)
CREAT SERPL-MCNC: 0.85 MG/DL (ref 0.57–1)
DEPRECATED RDW RBC AUTO: 41.4 FL (ref 37–54)
EGFRCR SERPLBLD CKD-EPI 2021: 77.6 ML/MIN/1.73
EOSINOPHIL # BLD AUTO: 0.3 10*3/MM3 (ref 0–0.4)
EOSINOPHIL NFR BLD AUTO: 5 % (ref 0.3–6.2)
ERYTHROCYTE [DISTWIDTH] IN BLOOD BY AUTOMATED COUNT: 13.1 % (ref 12.3–15.4)
GLOBULIN UR ELPH-MCNC: 2.9 GM/DL
GLUCOSE SERPL-MCNC: 91 MG/DL (ref 65–99)
HCT VFR BLD AUTO: 40.6 % (ref 34–46.6)
HGB BLD-MCNC: 13.5 G/DL (ref 12–15.9)
IMM GRANULOCYTES # BLD AUTO: 0.01 10*3/MM3 (ref 0–0.05)
IMM GRANULOCYTES NFR BLD AUTO: 0.2 % (ref 0–0.5)
LYMPHOCYTES # BLD AUTO: 1.3 10*3/MM3 (ref 0.7–3.1)
LYMPHOCYTES NFR BLD AUTO: 21.8 % (ref 19.6–45.3)
MCH RBC QN AUTO: 28.9 PG (ref 26.6–33)
MCHC RBC AUTO-ENTMCNC: 33.3 G/DL (ref 31.5–35.7)
MCV RBC AUTO: 86.9 FL (ref 79–97)
MONOCYTES # BLD AUTO: 0.54 10*3/MM3 (ref 0.1–0.9)
MONOCYTES NFR BLD AUTO: 9.1 % (ref 5–12)
NEUTROPHILS NFR BLD AUTO: 3.77 10*3/MM3 (ref 1.7–7)
NEUTROPHILS NFR BLD AUTO: 63.2 % (ref 42.7–76)
NRBC BLD AUTO-RTO: 0 /100 WBC (ref 0–0.2)
PLATELET # BLD AUTO: 220 10*3/MM3 (ref 140–450)
PMV BLD AUTO: 10.4 FL (ref 6–12)
POTASSIUM SERPL-SCNC: 4.7 MMOL/L (ref 3.5–5.2)
PROT SERPL-MCNC: 7.2 G/DL (ref 6–8.5)
RBC # BLD AUTO: 4.67 10*6/MM3 (ref 3.77–5.28)
SODIUM SERPL-SCNC: 144 MMOL/L (ref 136–145)
T4 FREE SERPL-MCNC: 1.1 NG/DL (ref 0.92–1.68)
TSH SERPL DL<=0.05 MIU/L-ACNC: 1.62 UIU/ML (ref 0.27–4.2)
VIT B12 BLD-MCNC: 516 PG/ML (ref 211–946)
WBC NRBC COR # BLD AUTO: 5.96 10*3/MM3 (ref 3.4–10.8)

## 2024-08-01 PROCEDURE — 84439 ASSAY OF FREE THYROXINE: CPT | Performed by: NURSE PRACTITIONER

## 2024-08-01 PROCEDURE — 82607 VITAMIN B-12: CPT | Performed by: NURSE PRACTITIONER

## 2024-08-01 PROCEDURE — 82306 VITAMIN D 25 HYDROXY: CPT | Performed by: NURSE PRACTITIONER

## 2024-08-01 PROCEDURE — 80050 GENERAL HEALTH PANEL: CPT | Performed by: NURSE PRACTITIONER

## 2024-08-01 PROCEDURE — 36415 COLL VENOUS BLD VENIPUNCTURE: CPT

## 2024-08-01 NOTE — PROGRESS NOTES
Subjective   Gaby Dobbins is a 64 y.o. female. Referred by Dr. Mojica for microinvasive carcinoma of the right breast.    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  She denies palpating any abnormal masses in either breast, skin or nipple changes prior to this.    No family history of breast or ovarian carcinoma.    3/20/2023-bilateral screening mammogram  Calcifications in the right breast require additional evaluation.  Magnification is recommended.    Right breast diagnostic mammogram  New calcifications in the right breast are suspicious.  Stereotactic biopsy is recommended.    5/15/2023-right breast stereoactic biopsy  Pathology consistent with high-grade solid ductal carcinoma in situ with rare foci of irregular ductal clusters.  Highly suspicious for invasive carcinoma.  DCIS-high-grade, size 3 mm  Microcalcifications are present in DCIS  DCIS is ER negative  NE negative  Ki-67 88.66%    Pathology reviewed at [facility]  Right breast 8:00 stereotactic biopsy consistent with microinvasive carcinoma 0.9 mm arising out of a background of high-grade ductal carcinoma in situ with a brisk lymphocytic response.  ER negative, NE negative    Invitae 47 gene panel negative    5/19/2023-bilateral breast MRI-3.4 cm biopsy cavity at 7:00 in the posterior right breast represents biopsy-proven malignancy.  No suspicious enhancement is identified at the biopsy site and the biopsy clip is laterally displaced.  Surgical management is recommended.  No MRI evidence of malignancy in the left breast.     6/19/2023-right breast lumpectomy  High-grade ductal carcinoma in situ measuring 12 mm  Margins are negative for in situ carcinoma  2 sentinel lymph nodes negative  ER negative  NE negative  HER2 1+    pT1 MI N0 M0    Patient has met with radiation oncology and plans to start radiation  7/25/2023.    Completed adjuvant radiation.    Interval History  Patient is reviewed back today in 6-month follow-up.  Thankfully she is doing well no longer experiencing back pain as she was previously.  She is frustrated by some weight gain experienced since her breast cancer diagnosis.  She notes that she needs to get back into her PCP, KELYL Brannon, for an annual follow-up and plans to discuss this further at that time.  It does appear that some labs were ordered previously by our survivorship clinic that were not completed specifically vitamin D, B12 and thyroid studies.  We discussed checking all of this for her today, all of which I can forward onto her PCP.  The patient would like to do this.  Otherwise she denies any new complaints.  She is up-to-date on screening mammogram most recently performed in April 2024, benign.      The following portions of the patient's history were reviewed and updated as appropriate: allergies, current medications, past family history, past medical history, past social history, past surgical history, and problem list.    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    Migraines         Past Surgical History:   Procedure Laterality Date    BLEPHAROPLASTY Left     LOWER    BREAST LUMPECTOMY WITH SENTINEL NODE BIOPSY Right 6/19/2023    Procedure: Right needle-localized, bracketed, partial mastectomy and sentinel lymph node biopsy;  Surgeon: Kristin Mojica MD;  Location: [facility];  Service: General;  Laterality: Right;    KIDNEY STONE SURGERY  2000    KNEE MENISCAL REPAIR          Family History   Problem Relation Age of Onset    Hypertension Father     Malig Hyperthermia Neg Hx         Social History     Socioeconomic History    Marital status:      Spouse name: Dewey    Number of children: 2   Tobacco Use    Smoking status: Never     Passive exposure: Never     "Smokeless tobacco: Never   Vaping Use    Vaping status: Never Used   Substance and Sexual Activity    Alcohol use: Yes     Comment: SOCIAL    Drug use: Never    Sexual activity: Defer        OB History    No obstetric history on file.      Age at menarche-14  Age at first live childbirth-32   2 para 2  0  Breast-feeding-6 to 8 weeks  Age at menopause-in her early 50s  Oral conceptive pill use for greater than 25 years  No use of hormone replacement    Allergies   Allergen Reactions    Moxifloxacin Hives        Review of Systems   Constitutional: Negative.    HENT: Negative.     Eyes: Negative.    Respiratory: Negative.     Cardiovascular: Negative.    Gastrointestinal: Negative.    Endocrine: Negative.    Genitourinary: Negative.  Positive for amenorrhea.   Musculoskeletal: Negative.    Skin: Negative.    Allergic/Immunologic: Negative.    Neurological: Negative.    Hematological: Negative.      Review of systems as mentioned HPI otherwise negative    Objective   Blood pressure 122/84, pulse 92, temperature 98 °F (36.7 °C), temperature source Oral, height 157.5 cm (62.01\"), weight 75.6 kg (166 lb 9.6 oz), SpO2 97%.   Physical Exam  Constitutional:       Appearance: Normal appearance. She is normal weight.   HENT:      Head: Normocephalic and atraumatic.      Right Ear: External ear normal.      Left Ear: External ear normal.      Nose: Nose normal.      Mouth/Throat:      Pharynx: Oropharynx is clear.   Eyes:      Conjunctiva/sclera: Conjunctivae normal.   Cardiovascular:      Rate and Rhythm: Normal rate.   Pulmonary:      Effort: Pulmonary effort is normal.   Abdominal:      General: Abdomen is flat.   Musculoskeletal:         General: Normal range of motion.      Cervical back: Normal range of motion.   Skin:     General: Skin is warm.   Neurological:      General: No focal deficit present.      Mental Status: She is alert and oriented to person, place, and time.   Psychiatric:         Mood and " Affect: Mood normal.         Behavior: Behavior normal.         Thought Content: Thought content normal.         Judgment: Judgment normal.       Breast exam-left breast appears normal on inspection.  No palpable abnormalities of the left breast  Right breast on inspection there is a well-healed scar in the lower outer quadrant as well as in the right axilla.  Underlying scar tissue but no other palpable abnormalities.  The right breast tissue is somewhat more firm likely due to radiation    I have reexamined the patient and the results are consistent with the previously documented exam. Anna Alegria, APRN       Results from last 7 days   Lab Units 08/01/24  1556   WBC 10*3/mm3 5.96   NEUTROS ABS 10*3/mm3 3.77   HEMOGLOBIN g/dL 13.5   HEMATOCRIT % 40.6   PLATELETS 10*3/mm3 220     Results from last 7 days   Lab Units 08/01/24  1556   SODIUM mmol/L 144   POTASSIUM mmol/L 4.7   CHLORIDE mmol/L 104   CO2 mmol/L 27.6   BUN mg/dL 20   CREATININE mg/dL 0.85   CALCIUM mg/dL 9.5   ALBUMIN g/dL 4.3   BILIRUBIN mg/dL 0.3   ALK PHOS U/L 91   ALT (SGPT) U/L 13   AST (SGOT) U/L 31   GLUCOSE mg/dL 91             No visits with results within 30 Day(s) from this visit.   Latest known visit with results is:   Hospital Outpatient Visit on 08/21/2023   Component Date Value Ref Range Status    Course ID 08/21/2023 C1: R [id number]   Final    Course Intent 08/21/2023 Curative   Final    Course Start Date 08/21/2023 7/18/2023  9:51 AM   Final    Course First Treatment Date 08/21/2023 7/25/2023  1:35 PM   Final    Course Last Treatment Date 08/21/2023 8/21/2023  8:19 AM   Final    Course Elapsed Days 08/21/2023 27   Final    Reference Point ID 08/21/2023 RX: Rt Brst Jose E   Final    Reference Point Dosage Given to Da* 08/21/2023 8  Gy Final    Reference Point Session Dosage Giv* 08/21/2023 2  Gy Final    Plan ID 08/21/2023 Rt Brst Boost   Final    Plan Fractions Treated to Date 08/21/2023 4   Final    Plan Total Fractions  Prescribed 08/21/2023 4   Final    Plan Prescribed Dose Per Fraction 08/21/2023 2  Gy Final    Plan Total Prescribed Dose 08/21/2023 800  cGy Final    Plan Primary Reference Point 08/21/2023 RX: Rt Brst Jose E   Final    Course ID 08/21/2023 C1: R [id number]   Final    Course Intent 08/21/2023 Curative   Final    Course Start Date 08/21/2023 7/18/2023  9:51 AM   Final    Course End Date 08/21/2023 8/21/2023  9:10 AM   Final    Course First Treatment Date 08/21/2023 7/25/2023  1:35 PM   Final    Course Last Treatment Date 08/21/2023 8/21/2023  8:19 AM   Final    Course Elapsed Days 08/21/2023 27   Final    Reference Point ID 08/21/2023 RX: Rt Breast   Final    Reference Point Dosage Given to Da* 08/21/2023 42.56  Gy Final    Reference Point ID 08/21/2023 RX: Rt Brst Jose E   Final    Reference Point Dosage Given to Da* 08/21/2023 8  Gy Final    Plan ID 08/21/2023 Rt Breast   Final    Plan Name 08/21/2023 Rt Breast   Final    Plan Fractions Treated to Date 08/21/2023 16   Final    Plan Total Fractions Prescribed 08/21/2023 16   Final    Plan Prescribed Dose Per Fraction 08/21/2023 2.66  Gy Final    Plan Total Prescribed Dose 08/21/2023 4,256  cGy Final    Plan Primary Reference Point 08/21/2023 RX: Rt Breast   Final    Plan ID 08/21/2023 Rt Brst Boost   Final    Plan Name 08/21/2023 Rt Brst Boost   Final    Plan Fractions Treated to Date 08/21/2023 4   Final    Plan Total Fractions Prescribed 08/21/2023 4   Final    Plan Prescribed Dose Per Fraction 08/21/2023 2  Gy Final    Plan Total Prescribed Dose 08/21/2023 800  cGy Final    Plan Primary Reference Point 08/21/2023 RX: Rt Brst Jose E   Final        No radiology results for the last 30 days.       Assessment & Plan       *Right breast microinvasive ductal carcinoma and DCIS  PT 1 MI N0 M0, tumor is triple negative with a Ki-67 of over 80%, anatomic stage Ia and prognostic stage Ia.  Tumor was arising in a background of high-grade ductal carcinoma in situ   Status post  right lumpectomy on 6/19/2023, margins negative, 2 sentinel lymph nodes negative  Completed adjuvant radiation to the right breast  No indication for endocrine therapy given triple negative nature of disease.  4/2024 screening mammogram negative  8/1/2024 SEGUNDO on exam.  Doing well overall.  Continues with surveillance alone    *History of back pain  New since the start of radiation  Limiting her physical activity  Continue physical therapy  Referral to pain management will be made   Solved.    *Weight gain since breast cancer diagnosis.  8/1/2024 patient planning to make follow-up with her PCP for her annual physical.  For convenience we will go ahead and draw labs today including some of which previously ordered by the survivorship clinic but not performed.    PLAN:  Patient go back to the lab today for CBC, CMP, B12, vitamin D, TSH, free T4.  We will forward these results on to patient's PCP, KELLY Brannon.  Patient will otherwise follow-up in 6 months with Dr. Zamora as already scheduled.    I spent 34 minutes caring for Gaby on this date of service. This time includes time spent by me in the following activities: preparing for the visit, reviewing tests, performing a medically appropriate examination and/or evaluation, counseling and educating the patient/family/caregiver, referring and communicating with other health care professionals, documenting information in the medical record, independently interpreting results and communicating that information with the patient/family/caregiver, care coordination, ordering test(s), obtaining a separately obtained history, and reviewing a separately obtained history

## 2024-08-08 ENCOUNTER — OFFICE VISIT (OUTPATIENT)
Dept: INTERNAL MEDICINE | Facility: CLINIC | Age: 64
End: 2024-08-08
Payer: COMMERCIAL

## 2024-08-08 VITALS
HEART RATE: 78 BPM | RESPIRATION RATE: 16 BRPM | WEIGHT: 164 LBS | DIASTOLIC BLOOD PRESSURE: 74 MMHG | BODY MASS INDEX: 30.18 KG/M2 | HEIGHT: 62 IN | OXYGEN SATURATION: 98 % | SYSTOLIC BLOOD PRESSURE: 118 MMHG

## 2024-08-08 DIAGNOSIS — T45.2X4A POISONING BY VITAMIN D, UNDETERMINED INTENT, INITIAL ENCOUNTER: Primary | ICD-10-CM

## 2024-08-08 PROCEDURE — 99213 OFFICE O/P EST LOW 20 MIN: CPT | Performed by: NURSE PRACTITIONER

## 2024-08-08 NOTE — PROGRESS NOTES
Subjective   Gaby Dobbins is a 64 y.o. female. Patient is here today for   Chief Complaint   Patient presents with    Elevated Vitamin D          Vitals:    08/08/24 1251   BP: 118/74   Pulse: 78   Resp: 16   SpO2: 98%     Body mass index is 29.99 kg/m².  The following portions of the patient's history were reviewed and updated as appropriate: allergies, current medications, past family history, past medical history, past social history, past surgical history and problem list.    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    Migraines       Allergies   Allergen Reactions    Moxifloxacin Hives      Social History     Socioeconomic History    Marital status:      Spouse name: Dewey    Number of children: 2   Tobacco Use    Smoking status: Never     Passive exposure: Never    Smokeless tobacco: Never   Vaping Use    Vaping status: Never Used   Substance and Sexual Activity    Alcohol use: Yes     Comment: SOCIAL    Drug use: Never    Sexual activity: Defer        Current Outpatient Medications:     cetirizine (zyrTEC) 10 MG tablet, Take 1 tablet by mouth Daily As Needed for Allergies., Disp: , Rfl:     MAGNESIUM PO, Take  by mouth. HOLD FOR SURGERY, Disp: , Rfl:     SUMAtriptan-naproxen (TREXIMET)  MG per tablet, TAKE ONE TABLET BY MOUTH AT ONSET OF HEADACHE. MAY REPEAT AFTER TWO HOURS. MAX TWO DOSES DAILY, Disp: 9 tablet, Rfl: 11    gabapentin (Neurontin) 100 MG capsule, Take 1 capsule by mouth 3 (Three) Times a Day., Disp: 90 capsule, Rfl: 0     Objective     History of Present Illness  Sabra is a 64 year old female patient who is here for a follow up for labs. She had labs drawn with oncology and her vitamin d was elevated at 103 and in the toxic range. She reports she was taking 4000 iu daily.. She stopped her vitamin d and multivitamin    Office Visit on 08/01/2024   Component Date Value Ref Range Status     Glucose 08/01/2024 91  65 - 99 mg/dL Final    BUN 08/01/2024 20  8 - 23 mg/dL Final    Creatinine 08/01/2024 0.85  0.57 - 1.00 mg/dL Final    Sodium 08/01/2024 144  136 - 145 mmol/L Final    Potassium 08/01/2024 4.7  3.5 - 5.2 mmol/L Final    Chloride 08/01/2024 104  98 - 107 mmol/L Final    CO2 08/01/2024 27.6  22.0 - 29.0 mmol/L Final    Calcium 08/01/2024 9.5  8.6 - 10.5 mg/dL Final    Total Protein 08/01/2024 7.2  6.0 - 8.5 g/dL Final    Albumin 08/01/2024 4.3  3.5 - 5.2 g/dL Final    ALT (SGPT) 08/01/2024 13  1 - 33 U/L Final    AST (SGOT) 08/01/2024 31  1 - 32 U/L Final    Alkaline Phosphatase 08/01/2024 91  39 - 117 U/L Final    Total Bilirubin 08/01/2024 0.3  0.0 - 1.2 mg/dL Final    Globulin 08/01/2024 2.9  gm/dL Final    A/G Ratio 08/01/2024 1.5  g/dL Final    BUN/Creatinine Ratio 08/01/2024 23.5  7.0 - 25.0 Final    Anion Gap 08/01/2024 12.4  5.0 - 15.0 mmol/L Final    eGFR 08/01/2024 77.6  >60.0 mL/min/1.73 Final    WBC 08/01/2024 5.96  3.40 - 10.80 10*3/mm3 Final    RBC 08/01/2024 4.67  3.77 - 5.28 10*6/mm3 Final    Hemoglobin 08/01/2024 13.5  12.0 - 15.9 g/dL Final    Hematocrit 08/01/2024 40.6  34.0 - 46.6 % Final    MCV 08/01/2024 86.9  79.0 - 97.0 fL Final    MCH 08/01/2024 28.9  26.6 - 33.0 pg Final    MCHC 08/01/2024 33.3  31.5 - 35.7 g/dL Final    RDW 08/01/2024 13.1  12.3 - 15.4 % Final    RDW-SD 08/01/2024 41.4  37.0 - 54.0 fl Final    MPV 08/01/2024 10.4  6.0 - 12.0 fL Final    Platelets 08/01/2024 220  140 - 450 10*3/mm3 Final    Neutrophil % 08/01/2024 63.2  42.7 - 76.0 % Final    Lymphocyte % 08/01/2024 21.8  19.6 - 45.3 % Final    Monocyte % 08/01/2024 9.1  5.0 - 12.0 % Final    Eosinophil % 08/01/2024 5.0  0.3 - 6.2 % Final    Basophil % 08/01/2024 0.7  0.0 - 1.5 % Final    Immature Grans % 08/01/2024 0.2  0.0 - 0.5 % Final    Neutrophils, Absolute 08/01/2024 3.77  1.70 - 7.00 10*3/mm3 Final    Lymphocytes, Absolute 08/01/2024 1.30  0.70 - 3.10 10*3/mm3 Final    Monocytes, Absolute  08/01/2024 0.54  0.10 - 0.90 10*3/mm3 Final    Eosinophils, Absolute 08/01/2024 0.30  0.00 - 0.40 10*3/mm3 Final    Basophils, Absolute 08/01/2024 0.04  0.00 - 0.20 10*3/mm3 Final    Immature Grans, Absolute 08/01/2024 0.01  0.00 - 0.05 10*3/mm3 Final    nRBC 08/01/2024 0.0  0.0 - 0.2 /100 WBC Final   Office Visit on 08/01/2024   Component Date Value Ref Range Status    Vitamin B-12 08/01/2024 516  211 - 946 pg/mL Final    25 Hydroxy, Vitamin D 08/01/2024 103.0 (H)  30.0 - 100.0 ng/ml Final    TSH 08/01/2024 1.620  0.270 - 4.200 uIU/mL Final    Free T4 08/01/2024 1.10  0.92 - 1.68 ng/dL Final         Review of Systems   Constitutional:  Negative for fatigue.   Respiratory: Negative.     Cardiovascular: Negative.    Gastrointestinal:  Negative for constipation.   Musculoskeletal:  Negative for arthralgias.       Physical Exam  Vitals and nursing note reviewed.   Cardiovascular:      Rate and Rhythm: Normal rate and regular rhythm.   Pulmonary:      Effort: Pulmonary effort is normal.      Breath sounds: Normal breath sounds.   Skin:     General: Skin is warm and dry.   Neurological:      Mental Status: She is alert.         Assessment    ASSESSMENT    Problems Addressed this Visit    None  Visit Diagnoses       Poisoning by vitamin D, undetermined intent, initial encounter    -  Primary          Diagnoses         Codes Comments    Poisoning by vitamin D, undetermined intent, initial encounter    -  Primary ICD-10-CM: T45.2X4A  ICD-9-CM: 963.5, E980.4             PLAN  Vitamin d toxicity, bmp shows normal calcium and creatinine , will repeat vitamin d today, continue to hold multivitamin and vitamin d  Follow up next month for CPE will check labs same day

## 2024-08-09 ENCOUNTER — TELEPHONE (OUTPATIENT)
Dept: INTERNAL MEDICINE | Facility: CLINIC | Age: 64
End: 2024-08-09
Payer: COMMERCIAL

## 2024-08-09 LAB — 25(OH)D3+25(OH)D2 SERPL-MCNC: 110 NG/ML (ref 30–100)

## 2024-08-09 NOTE — TELEPHONE ENCOUNTER
Reviewed vitamin d level and it's still elevated  . She stopped vitamin d supplements last week. Spoke with endocrinologist, Dr VILLALOBOS it may take a few months for vitamin d to come down. The patient is asymptomatic , kidney function and calcium are normal . She has a follow up in 3 weeks. Discussed dietary changes and limiting foods high in vitamin d   Patient verbalized understanding

## 2024-09-03 ENCOUNTER — TELEPHONE (OUTPATIENT)
Dept: RADIATION ONCOLOGY | Facility: HOSPITAL | Age: 64
End: 2024-09-03
Payer: COMMERCIAL

## 2024-09-03 ENCOUNTER — OFFICE VISIT (OUTPATIENT)
Dept: INTERNAL MEDICINE | Facility: CLINIC | Age: 64
End: 2024-09-03
Payer: COMMERCIAL

## 2024-09-03 VITALS
HEIGHT: 62 IN | DIASTOLIC BLOOD PRESSURE: 74 MMHG | BODY MASS INDEX: 30 KG/M2 | SYSTOLIC BLOOD PRESSURE: 114 MMHG | OXYGEN SATURATION: 98 % | HEART RATE: 76 BPM | WEIGHT: 163 LBS

## 2024-09-03 DIAGNOSIS — J06.9 ACUTE URI: ICD-10-CM

## 2024-09-03 DIAGNOSIS — T45.2X4A POISONING BY VITAMIN D, UNDETERMINED INTENT, INITIAL ENCOUNTER: ICD-10-CM

## 2024-09-03 DIAGNOSIS — Z00.00 ANNUAL PHYSICAL EXAM: Primary | ICD-10-CM

## 2024-09-03 DIAGNOSIS — U07.1 COVID-19: ICD-10-CM

## 2024-09-03 DIAGNOSIS — G47.09 OTHER INSOMNIA: ICD-10-CM

## 2024-09-03 DIAGNOSIS — Z11.59 NEED FOR HEPATITIS C SCREENING TEST: ICD-10-CM

## 2024-09-03 LAB
EXPIRATION DATE: ABNORMAL
FLUAV AG UPPER RESP QL IA.RAPID: NOT DETECTED
FLUBV AG UPPER RESP QL IA.RAPID: NOT DETECTED
INTERNAL CONTROL: ABNORMAL
Lab: ABNORMAL
SARS-COV-2 AG UPPER RESP QL IA.RAPID: DETECTED

## 2024-09-03 PROCEDURE — 99214 OFFICE O/P EST MOD 30 MIN: CPT | Performed by: NURSE PRACTITIONER

## 2024-09-03 PROCEDURE — 87428 SARSCOV & INF VIR A&B AG IA: CPT | Performed by: NURSE PRACTITIONER

## 2024-09-03 PROCEDURE — 99396 PREV VISIT EST AGE 40-64: CPT | Performed by: NURSE PRACTITIONER

## 2024-09-03 RX ORDER — ZOLPIDEM TARTRATE 10 MG/1
10 TABLET ORAL NIGHTLY PRN
Qty: 10 TABLET | Refills: 0 | Status: SHIPPED | OUTPATIENT
Start: 2024-09-03

## 2024-09-03 NOTE — TELEPHONE ENCOUNTER
PT called regarding her f/u appt with [facility] on 9/4 and has Covid. We did reschedule her appt for 9/30

## 2024-09-03 NOTE — PROGRESS NOTES
Subjective   Gaby Dobbins is a 64 y.o. female. Patient is here today for   Chief Complaint   Patient presents with    Annual Exam    URI    Travel Consult     Traveling to [place] and requesting medication for insomnia           Vitals:    09/03/24 0757   BP: 114/74   Pulse: 76   SpO2: 98%     Body mass index is 29.81 kg/m².    The following portions of the patient's history were reviewed and updated as appropriate: allergies, current medications, past family history, past medical history, past social history, past surgical history and problem list.    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    Migraines       Allergies   Allergen Reactions    Moxifloxacin Hives      Social History     Socioeconomic History    Marital status:      Spouse name: Dewey    Number of children: 2   Tobacco Use    Smoking status: Never     Passive exposure: Never    Smokeless tobacco: Never   Vaping Use    Vaping status: Never Used   Substance and Sexual Activity    Alcohol use: Yes     Comment: SOCIAL    Drug use: Never    Sexual activity: Defer        Current Outpatient Medications:     cetirizine (zyrTEC) 10 MG tablet, Take 1 tablet by mouth Daily As Needed for Allergies., Disp: , Rfl:     MAGNESIUM PO, Take  by mouth. HOLD FOR SURGERY, Disp: , Rfl:     SUMAtriptan-naproxen (TREXIMET)  MG per tablet, TAKE ONE TABLET BY MOUTH AT ONSET OF HEADACHE. MAY REPEAT AFTER TWO HOURS. MAX TWO DOSES DAILY, Disp: 9 tablet, Rfl: 11    gabapentin (Neurontin) 100 MG capsule, Take 1 capsule by mouth 3 (Three) Times a Day., Disp: 90 capsule, Rfl: 0    zolpidem (Ambien) 10 MG tablet, Take 1 tablet by mouth At Night As Needed for Sleep., Disp: 10 tablet, Rfl: 0          Objective   History of Present Illness   Gaby Dobbins 64 y.o. female who presents for an Annual physical exam and for an acute visit. She has had congestion and drainage  that started yesterday. She returned from [place] last Wednesday. She denies any ill exposure but was on an airplane. She denies fever, chills, and body aches.   She is going to [place] next month. She would like to discuss a short term rx for ambien for sleep while she is away. She has taken it before and has tolerated it without side effects.     Plan to update vaccines if needed today.     Health Habits:  Dental Exam. not up to date - .  Eye Exam. not up to date - .  Exercise: 6 times/week.  Current exercise activities include: walking and tennis , and cv workout       Preventative counseling  - seat belt use for every car ride for patient and all occupants.   Encouraged sunscreen use to reduce risk of skin cancer for any days with sun exposure over 20 minutes.   -Encouraged annual dental and vision exams as part of their overall health.   -Encouraged  exercise  combined with a well-balanced diet.   -Immunizations reviewed and updated in EMR.     The 10-year ASCVD risk score (Libby SCHAFFER, et al., 2019) is: 3%    Values used to calculate the score:      Age: 64 years      Sex: Female      Is Non- : No      Diabetic: No      Tobacco smoker: No      Systolic Blood Pressure: 114 mmHg      Is BP treated: No      HDL Cholesterol: 74 mg/dL      Total Cholesterol: 224 mg/dL    Lab Results (most recent)       None            Health Maintenance   Topic Date Due    HEPATITIS C SCREENING  Never done    MAMMOGRAM  06/01/2024    INFLUENZA VACCINE  08/01/2024    ZOSTER VACCINE (1 of 2) 09/03/2024 (Originally 5/29/1979)    COVID-19 Vaccine (4 - 2023-24 season) 11/23/2024 (Originally 9/1/2024)    COLORECTAL CANCER SCREENING  01/01/2025 (Originally 1960)    TDAP/TD VACCINES (1 - Tdap) 05/06/2025 (Originally 5/29/1979)    Pneumococcal Vaccine 0-64 (1 of 2 - PCV) 09/03/2025 (Originally 5/29/1966)    ANNUAL PHYSICAL  05/06/2025    BMI FOLLOWUP  09/03/2025    PAP SMEAR  05/01/2027           Review of Systems    Constitutional:  Negative for fever.   HENT:  Positive for congestion and rhinorrhea.    Respiratory:  Positive for cough. Negative for shortness of breath and wheezing.    Cardiovascular: Negative.    Musculoskeletal:  Positive for arthralgias (occasional).   Skin:  Negative for rash.   Neurological:  Negative for dizziness and headaches.   Psychiatric/Behavioral:  Negative for dysphoric mood and sleep disturbance. The patient is not nervous/anxious.        Physical Exam  Vitals and nursing note reviewed.   Constitutional:       General: She is not in acute distress.     Appearance: Normal appearance. She is well-developed and well-groomed.   HENT:      Head: Normocephalic.      Right Ear: There is impacted cerumen.      Left Ear: Tympanic membrane and ear canal normal.      Nose: Rhinorrhea present. Rhinorrhea is clear.      Mouth/Throat:      Pharynx: Oropharynx is clear.   Eyes:      General: Lids are normal.      Conjunctiva/sclera: Conjunctivae normal.   Neck:      Thyroid: No thyromegaly.   Cardiovascular:      Rate and Rhythm: Normal rate and regular rhythm.      Heart sounds: Normal heart sounds.   Pulmonary:      Effort: Pulmonary effort is normal.      Breath sounds: Normal breath sounds.   Abdominal:      General: Bowel sounds are normal.      Palpations: Abdomen is soft.      Tenderness: There is no abdominal tenderness.   Genitourinary:     Comments: Deferred has gyn   Musculoskeletal:      Cervical back: Neck supple.   Skin:     General: Skin is warm and dry.   Neurological:      Mental Status: She is alert and oriented to person, place, and time.   Psychiatric:         Mood and Affect: Mood normal.         ASSESSMENT       Problems Addressed this Visit    None  Visit Diagnoses       Annual physical exam    -  Primary    Relevant Orders    Lipid Panel With LDL / HDL Ratio    CBC & Differential    Comprehensive Metabolic Panel    Urinalysis With Microscopic - Urine, Clean Catch    TSH Rfx On Abnormal  To Free T4    Poisoning by vitamin D, undetermined intent, initial encounter        Relevant Orders    Vitamin D,25-Hydroxy    Need for hepatitis C screening test        Relevant Orders    Hepatitis C Antibody    Acute URI        Relevant Orders    POCT SARS-CoV-2 Antigen MELANIE + Flu (Completed)    Other insomnia        Relevant Medications    zolpidem (Ambien) 10 MG tablet    COVID-19              Diagnoses         Codes Comments    Annual physical exam    -  Primary ICD-10-CM: Z00.00  ICD-9-CM: V70.0     Poisoning by vitamin D, undetermined intent, initial encounter     ICD-10-CM: T45.2X4A  ICD-9-CM: 963.5, E980.4     Need for hepatitis C screening test     ICD-10-CM: Z11.59  ICD-9-CM: V73.89     Acute URI     ICD-10-CM: J06.9  ICD-9-CM: 465.9     Other insomnia     ICD-10-CM: G47.09  ICD-9-CM: 780.52     COVID-19     ICD-10-CM: U07.1  ICD-9-CM: 079.89             PLAN    Age and sex appropriate physical exam performed and documented. Updated past medical, family, social and surgical histories as well as allergies and care team list.   Covid test is positive . I recommend attention to rest and fluids. I recommend as needed tylenol for fevers and aches. Paxlovid is an FDA approved for emergency use.  Important considerations include numerous drug interactions.  Symptoms could rebound in some after a course of this medication according to recent reports.   The CDC's new guidance now matches public health advice for flu and other respiratory illnesses: Stay home when you're sick, but return to school or work once you're feeling better and you've been without a fever for 24 hours.  Continue to wear a mask and take precautions for a total of five days.    Reasons to go to the ER include severe trouble breathing, chest pain, confusion, inability to wake or stay awake, and bluish lips or face.  Continue supportive measures and let me know if there is any change in symptoms.        She declines paxlovid at this time,  recommend vitamin c and zinc   Rx for ambien given to use as needed for difficulty sleeping while on trips. She has taken this before and has tolerated it without side effects. Avoid drinking ETOH and driving while taking it. She can take 1/2 to one tablet nightly as needed. PDMP reviewed and is appropriate   Deferred tdap and pneumovax today due to illness. Recommend shingrix, covid and flu vaccines   She will return for fasting labs.   [facility]   She has been referred for colonoscopy and will schedule at her convenience   Elevated vitamin d- will recheck vitamin d level, calcium and kidney function. She has been off of her supplement and has made dietary changes. I reassured the patient that I spoke with endocrinology and this may take several months to normalize       Return for schedule labs for one week . And yearly for annual physical with labs   Patient was given instructions and counseling regarding her  condition for health maintenance advice.  Please see specific information pulled into the AVS if appropriate.

## 2024-09-11 LAB
25(OH)D3+25(OH)D2 SERPL-MCNC: 110 NG/ML (ref 30–100)
ALBUMIN SERPL-MCNC: 4.3 G/DL (ref 3.5–5.2)
ALBUMIN/GLOB SERPL: 1.7 G/DL
ALP SERPL-CCNC: 90 U/L (ref 39–117)
ALT SERPL-CCNC: 20 U/L (ref 1–33)
AST SERPL-CCNC: 31 U/L (ref 1–32)
BASOPHILS # BLD AUTO: 0.02 10*3/MM3 (ref 0–0.2)
BASOPHILS NFR BLD AUTO: 0.4 % (ref 0–1.5)
BILIRUB SERPL-MCNC: 0.5 MG/DL (ref 0–1.2)
BUN SERPL-MCNC: 24 MG/DL (ref 8–23)
BUN/CREAT SERPL: 32.4 (ref 7–25)
CALCIUM SERPL-MCNC: 9.7 MG/DL (ref 8.6–10.5)
CHLORIDE SERPL-SCNC: 103 MMOL/L (ref 98–107)
CHOLEST SERPL-MCNC: 239 MG/DL (ref 0–200)
CO2 SERPL-SCNC: 25.4 MMOL/L (ref 22–29)
CREAT SERPL-MCNC: 0.74 MG/DL (ref 0.57–1)
EGFRCR SERPLBLD CKD-EPI 2021: 91.6 ML/MIN/1.73
EOSINOPHIL # BLD AUTO: 0.3 10*3/MM3 (ref 0–0.4)
EOSINOPHIL NFR BLD AUTO: 5.9 % (ref 0.3–6.2)
ERYTHROCYTE [DISTWIDTH] IN BLOOD BY AUTOMATED COUNT: 13 % (ref 12.3–15.4)
GLOBULIN SER CALC-MCNC: 2.6 GM/DL
GLUCOSE SERPL-MCNC: 92 MG/DL (ref 65–99)
HCT VFR BLD AUTO: 39.5 % (ref 34–46.6)
HCV IGG SERPL QL IA: NON REACTIVE
HDLC SERPL-MCNC: 65 MG/DL (ref 40–60)
HGB BLD-MCNC: 13.2 G/DL (ref 12–15.9)
IMM GRANULOCYTES # BLD AUTO: 0.01 10*3/MM3 (ref 0–0.05)
IMM GRANULOCYTES NFR BLD AUTO: 0.2 % (ref 0–0.5)
LDLC SERPL CALC-MCNC: 154 MG/DL (ref 0–100)
LDLC/HDLC SERPL: 2.32 {RATIO}
LYMPHOCYTES # BLD AUTO: 1.27 10*3/MM3 (ref 0.7–3.1)
LYMPHOCYTES NFR BLD AUTO: 25.1 % (ref 19.6–45.3)
MCH RBC QN AUTO: 28.4 PG (ref 26.6–33)
MCHC RBC AUTO-ENTMCNC: 33.4 G/DL (ref 31.5–35.7)
MCV RBC AUTO: 85.1 FL (ref 79–97)
MONOCYTES # BLD AUTO: 0.47 10*3/MM3 (ref 0.1–0.9)
MONOCYTES NFR BLD AUTO: 9.3 % (ref 5–12)
NEUTROPHILS # BLD AUTO: 2.99 10*3/MM3 (ref 1.7–7)
NEUTROPHILS NFR BLD AUTO: 59.1 % (ref 42.7–76)
NRBC BLD AUTO-RTO: 0 /100 WBC (ref 0–0.2)
PLATELET # BLD AUTO: 273 10*3/MM3 (ref 140–450)
POTASSIUM SERPL-SCNC: 4.4 MMOL/L (ref 3.5–5.2)
PROT SERPL-MCNC: 6.9 G/DL (ref 6–8.5)
RBC # BLD AUTO: 4.64 10*6/MM3 (ref 3.77–5.28)
SODIUM SERPL-SCNC: 140 MMOL/L (ref 136–145)
TRIGL SERPL-MCNC: 115 MG/DL (ref 0–150)
TSH SERPL DL<=0.005 MIU/L-ACNC: 1.76 UIU/ML (ref 0.27–4.2)
UNABLE TO VOID: NORMAL
VLDLC SERPL CALC-MCNC: 20 MG/DL (ref 5–40)
WBC # BLD AUTO: 5.06 10*3/MM3 (ref 3.4–10.8)

## 2024-09-27 ENCOUNTER — TELEPHONE (OUTPATIENT)
Dept: RADIATION ONCOLOGY | Facility: HOSPITAL | Age: 64
End: 2024-09-27
Payer: COMMERCIAL

## 2024-09-30 ENCOUNTER — OFFICE VISIT (OUTPATIENT)
Dept: RADIATION ONCOLOGY | Facility: HOSPITAL | Age: 64
End: 2024-09-30
Payer: COMMERCIAL

## 2024-09-30 VITALS
HEART RATE: 89 BPM | OXYGEN SATURATION: 98 % | WEIGHT: 163.6 LBS | BODY MASS INDEX: 29.92 KG/M2 | DIASTOLIC BLOOD PRESSURE: 85 MMHG | SYSTOLIC BLOOD PRESSURE: 132 MMHG

## 2024-09-30 DIAGNOSIS — C50.911 DUCTAL CARCINOMA IN SITU (DCIS) OF RIGHT BREAST WITH MICROINVASIVE COMPONENT: Primary | ICD-10-CM

## 2024-09-30 PROCEDURE — G0463 HOSPITAL OUTPT CLINIC VISIT: HCPCS | Performed by: RADIOLOGY

## 2024-09-30 NOTE — PROGRESS NOTES
CC: 1 year follow up for [id number] right breast cancer with dcis      S:     I had the [facility] of [facility].  She is a pleasant 64 year old female with [id number] right breast cancer, with high grade dcis, ER [place] negative Her 2 negative, ki67 88%. She returns today for follow up now 1 year out from completion of her radiation therapy to the breast.  She completed a dose of 4256cGy followed by a tumor bed boost of 1000cgy on 8/21/23.  She has been doing well since I last saw her.  She had a mammogram in April 2024 which was benign.           Review of Systems   Constitutional: Negative.    HENT: Negative.     Respiratory: Negative.     Musculoskeletal: Negative.    Skin: Negative.    Psychiatric/Behavioral: Negative.         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;  Service: General;  Laterality: Right;    KIDNEY STONE SURGERY  2000    KNEE MENISCAL REPAIR           Social History     Socioeconomic History    Marital status:      Spouse name: Dewey    Number of children: 2   Tobacco Use    Smoking status: Never     Passive exposure: Never    Smokeless tobacco: Never   Vaping Use    Vaping status: Never Used   Substance and Sexual Activity    Alcohol use: Yes     Comment: SOCIAL    Drug use: Never    Sexual activity: Defer         Family History   Problem Relation Age of Onset    Hypertension Father     Malig Hyperthermia Neg Hx           Objective    Physical Exam  Constitutional:       Appearance: Normal appearance.    HENT:      Head: Atraumatic.   Chest:          Comments: Right breast is smaller than left,no palpable masses in either breast   Neurological:      Mental Status: She is alert.   Psychiatric:         Mood and Affect: Mood normal.         Current Outpatient Medications on File Prior to Visit   Medication Sig Dispense Refill    cetirizine (zyrTEC) 10 MG tablet Take 1 tablet by mouth Daily As Needed for Allergies.      gabapentin (Neurontin) 100 MG capsule Take 1 capsule by mouth 3 (Three) Times a Day. 90 capsule 0    MAGNESIUM PO Take  by mouth. HOLD FOR SURGERY      SUMAtriptan-naproxen (TREXIMET)  MG per tablet TAKE ONE TABLET BY MOUTH AT ONSET OF HEADACHE. MAY REPEAT AFTER TWO HOURS. MAX TWO DOSES DAILY 9 tablet 11    zolpidem (Ambien) 10 MG tablet Take 1 tablet by mouth At Night As Needed for Sleep. 10 tablet 0     No current facility-administered medications on file prior to visit.       ALLERGIES:    Allergies   Allergen Reactions    Moxifloxacin Hives       There were no vitals taken for this visit.         8/1/2024     3:33 PM   Current Status   ECOG score 0         Assessment & Plan     64 year old female with [id number] right breast cancer, with high grade dcis, ER [place] negative Her 2 negative, ki67 88% now 1 year out from radiation with no evidence of disease on exam.  She will due for her yearly mammogram in March/April 2025 and follow up with  shortly after.  She will have regular follow up with her medical oncologist.  I offered a referral to plastic surgery and she may consider this down the road. I will see her back in one year or sooner if necessary. I asked her to call with any questions or concerns in the future.     I personally spent greater than 20 minutes today assessing, managing, discussing and documenting my visit with the patient. That time includes review of records, imaging and pathology reports, obtaining my own history, performing a medically appropriate evaluation,  counseling and educating the patient, discussing goals, logistics, alternatives and risks of my recommendations, surveillance options and potential outcomes. It also includes the time documenting the clinical information in the EMR and communicating my recommendations to the other involved physicians.               Thank you very much for allowing me to participate in the care of this very pleasant patient.    Sincerely,      Shyann Thomson MD

## 2024-10-04 NOTE — PROGRESS NOTES
[facility]     Referring Provider: Graciela Angulo MD     Chief complaint: Rt breast cancer follow up     Subjective   HPI:   5/31/2023:  Ms. Gaby Dobbins is a 62 yo woman, seen at the request of Dr. Graciela Angulo, for a new diagnosis of right breast cancer. This was initially detected as an imaging abnormality on routine screening. Her work-up is detailed in the oncologic history below. Prior to the biopsy, she denies any breast lumps, pain, skin changes, or nipple discharge. She denies any prior history of abnormal mammograms or breast biopsies. Her most recent mammogram prior to this year was in 2016. She denies any family history of breast or ovarian cancer.     7/5/2023  She underwent right partial mastectomy and SLNB on 6/19/23. See surgery & pathology details below in oncologic history. She has been recovering well and has no complaints.  She is very interested in alternative medicine options and wants me to sign off on receiving high-dose IV vitamin C from a wellness center.  She was joined today in clinic by her .      10/3/2023   Pt presenting to the office today for routine follow up.  She completely radiation in August. She saw oncology in July, it was not recommended to have adjuvant endocrine therapy or chemotherapy due to triple negative status. She has no new breast complaints or concerns today. SHe saw PT in July with a normal bio impedence score.     4/8/2024   Patient presenting to the office today for routine follow-up.  On 4/2/2024 she had a bilateral screening mammogram that resulted as BI-RADS 2.  She last saw oncology in January with no changes made to the treatment plan.  She has no new breast complaints or concerns today.    10/7/2024 interval history  Patient presenting to the office today for routine follow-up.  She last saw oncology in August with no changes made to the treatment plan.  She is doing well without complaints today.      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  There is a stable very small, oval mass measuring 3 mm seen in the sub areolar region of the left breast. No suspicious masses, suspicious microcalcifications or new areas of architectural distortion are identified. There has been no significant change from the prior exam(s).  In the right breast, no suspicious masses, significant calcifications or other abnormalities are seen.  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  There are calcifications with linear distribution seen in the posterior one-third central region of the right breast.  In the left breast, no suspicious masses, significant calcifications or other abnormalities are seen.  BI-RADS 0: Incomplete     5/1/2023 Imaging    Right Diagnostic MMG with [facility] ([facility])  On the present examination, there are multiple new indistinct calcifications with linear distribution in the posterior one third of the right breast at 8 o'clock, central located 8 cm from the nipple. This spans approximately 2 cm. Additional imaging demonstrates microcalcifications are suspicious and tissue sampling is recommended.  BI-RADS 4B: Suspicious     5/15/2023 Biopsy    Right Breast, Stereotactic Biopsy ([facility]):    Right Breast, 8:00, stereotactic core biopsies   High grade solid ductal carcinoma in situ (DCIS) with rare foci of irregular ductal clusters, Highly suspicious for invasive carcinoma.     Ductal carcinoma in situ (DCIS)    Nuclear grade: high    Architectural pattern(s): solid    Necrosis: not identified    Size: 3mm   Microcalcifications: present in DCIS    ER negative (0%)  WV negative  (0%)  Ki-67 88.66%  Her2 negative (IHC 1+)    -Top hat clip.  A 2 view postprocedure mammogram demonstrated the marker clip to have migrated approximately 2 cm lateral on the cc view from the expected biopsy site and a small hematoma.  Residual calcifications are present.    [facility] PATHOLOGY REVIEW    1. Right Breast, 8:00, Stereotactic-Guided Core Needle Biopsy:               A. MICROINVASIVE CARCINOMA, 0.9 MM , ARISING OUT OF A BACKGROUND OF HIGH GRADE                   DUCTAL CARCINOMA IN SITU (DCIS) WITH A BRISK LYMPHOCYTIC RESPONSE, Solid type with       single cell necrosis and calcifications.               B. Received immunostains performed at the outside institution shows the following from blocks A, B, and D with the following results (all controls reacted appropriately).                            P63:  Highlights an intact myoepithelial cell layer around the in-situ carcinoma with focal loss in 1D [facility]:  Highlights an intact myoepithelial cell layer around the in-situ carcinoma with focal loss in 1D               C. Repeat P63 immunostain as well as cytokeratin immunostain performed on unstained slides provided for block D shows the following (all controls reacted appropriately):                            P63: Intact myoepithelial cell layer in the area of in situ carcinoma and loss in the area of                            microinvasive carcinoma                            AE1/AE3: Highlights the irregularity of malignant cell groups               D. See comment.    Comment:  We agree with the outside diagnosis rendered. Per outside report, hormone receptor studies were performed on block B to show the following:      ER:  Negative (0)  SC:  Negative (0)  Ki67:  88.66%     HER2 immunohistochemistry has been requested to be performed at the original institution on block D. Results to follow and be scanned separately into the patient's electronic medical record.      5/19/2023 Imaging     Bilateral Breast MRI ( [facility]):  RIGHT BREAST:    At 7:00 in the posterior right breast, 7 cm posterior to the nipple, there is a 2.2 cm AP dimension, 3.4 cm transverse dimension, 1.0 cm craniocaudal dimension T2 hyperintense biopsy cavity with thin peripheral enhancement. There is a focus of susceptibility from a biopsy clip within the lateral aspect of the cavity, which was noted to be laterally displaced on the post biopsy mammogram. Additional susceptibility within the cavity likely reflects residual air from recent biopsy. No suspicious enhancement is identified at the biopsy site or elsewhere within the right breast.  No suspicious enhancement is identified in the right nipple or chest wall.   The visualized axilla is within normal limits.   LEFT BREAST:    No suspicious enhancing mass or area of non-mass enhancement is identified.  The visualized axilla is within normal limits.   EXTRAMAMMARY FINDINGS:   There are no pathologically enlarged internal mammary chain lymph nodes on either side.     BI-RADS 6: Known malignancy.     5/31/2023 Genetic Testing    [employer] Common Hereditary Cancers Panel (47 genes):    Negative     6/1/2023 Imaging    Right Diagnostic MMG with [facility] ( [facility]):  There is a top hat clip in the outer central posterior right breast from recent biopsy, which is located within the lateral aspect of an approximately 3.5 cm transverse oriented linear biopsy tract.  Approximately 1.6 cm faint residual calcifications are identified within the medial aspect of the tract and slightly medial and anterior to the tract on the CC view. Additional calcifications were previously noted  extending approximately 0.9 cm lateral to the most posterolateral calcification seen on the current examination, which were previously biopsied. This area has been marked and should be included at the time of excision.  The biopsy site, including the clip and calcifications, were difficult to include in the field-of-view  on lateral and MLO spot magnification views due to the posterior location. Residual calcifications are  visualized predominantly posterior to the top hat clip on the lateral spot magnification view and predominantly inferior to the clip on the full field MLO view. Differences in positioning of the clip relative to the residual calcifications is likely due to differences in patient positioning. Residual calcifications measure approximately 1.2 cm on the MLO view.  BI-RADS 6: Known malignancy.     6/19/2023 Surgery    Right needle-localized, bracketed, partial mastectomy and sentinel lymph node biopsy    1. Right Breast, Double Needle-Localized Partial Mastectomy (39 Grams):               A. HIGH GRADE DUCTAL CARCINOMA IN-SITU (DCIS):                            1. Solid type with calcifications.                            2. Extent of DCIS:  12 mm (based on slices involved).                            3. Margins are negative for in-situ carcinoma; Closest distance:  DCIS is present 2.5 mm from the inferior margin (superseded by specimen #3).               B. Clip and biopsy site changes are present and adjacent to in-situ carcinoma.               C. See synoptic report (to include parts 2-5) and Comments.     2. Right Breast, Additional Anterior, Superior and Medial Margins, Re-excision:               A. Breast parenchyma with no significant histopathologic changes       (additional 6 mm of tissue free of carcinoma).     3. Right Breast, Additional Inferior and Lateral Margins, Re-excision:               A. Breast parenchyma with focal biopsy site changes                    (additional 16 mm of tissue free of carcinoma).      4. Right Axillary [place] Lymph Node #1, Hot and Blue, Count 3,600, Biopsy:                 A. One lymph node, negative for carcinoma (0/1).     5. Right Axillary [place] Lymph Node #2, Hot and Blue, Count 1,000, Biopsy:                 A. One lymph node, negative for carcinoma (0/1).      7/25/2023 - 8/21/2023 Radiation    Radiation OncologyTreatment Course:  Gaby Dobbins received 5056 cGy in 20 fractions to right breast with tumor bed boost     4/2/2024 Imaging    Bilateral screening mammogram at [facility]    There are scattered areas of fibroglandular density.    Finding 1:  There is a post-surgical scar seen in the superficial central region of the right breast. Post-surgical  scar is in an area of prior lumpectomy.  Findings are consistent with post surgical and post radiation therapy  changes.  This is new since prior exam(s).    Finding 2:  There is a new post-surgical scar in the right axilla. Post-surgical scar is in an area of prior  sentinel node biopsy.  Metallic surgical clips noted.    In the left breast, no suspicious masses, significant calcifications or other abnormalities are seen.    IMPRESSION:  Finding 1:  Post-surgical scar in the superficial central region of the right breast is benign-negative.    Finding 2:  New post-surgical scar in the right axilla is benign-negative.    Screening mammogram in 1 year is recommended  BI-RADS Category 2: Benign Finding(s           Review of Systems:  See interval history.       Medications:    Current Outpatient Medications:     cetirizine (zyrTEC) 10 MG tablet, Take 1 tablet by mouth Daily As Needed for Allergies., Disp: , Rfl:     MAGNESIUM PO, Take  by mouth. HOLD FOR SURGERY, Disp: , Rfl:     SUMAtriptan-naproxen (TREXIMET)  MG per tablet, TAKE ONE TABLET BY MOUTH AT ONSET OF HEADACHE. MAY REPEAT AFTER TWO HOURS. MAX TWO DOSES DAILY, Disp: 9 tablet, Rfl: 11    zolpidem (Ambien) 10 MG tablet, Take 1 tablet by mouth At Night As Needed for Sleep., Disp: 10 tablet, Rfl: 0    gabapentin (Neurontin) 100 MG capsule, Take 1 capsule by mouth 3 (Three) Times a Day., Disp: 90 capsule, Rfl: 0      Allergies   Allergen Reactions    Moxifloxacin Hives       Family History   Problem Relation Age of Onset    Hypertension Father     Mimi  Hyperthermia Neg Hx        Objective   PHYSICAL EXAMINATION:   Vitals:    10/07/24 0913   BP: 124/80   Pulse: 82   SpO2: 97%         ECOG 0 - Asymptomatic  General: NAD, well appearing  Psych: a&o x3, normal mood and affect  Eyes: EOMI, no scleral icterus  ENMT: neck supple without masses or thyromegaly, mucous membranes moist  MSK: normal gait, normal ROM in bilateral shoulders  Lymph nodes: Well-healing right axillary incision with Dermabond intact.  No swelling.  Breast: asymmetric, moderate size, grade 3 ptosis R<L  Right: Well-healed lower outer radial incision.  No masses or nipple abnormalities. Breast is slightly pink and smaller from radiation. Slight edema 3:00-6:00  Left: No visible abnormalities on inspection while seated, with arms raised or hands on hips.  No masses, skin changes or nipple abnormalities      Assessment & Plan   Assessment:  64 y.o. F with a diagnosis of right breast cancer: Microinvasive carcinoma (0.9 mm) arising in a background of high-grade ductal carcinoma in situ (DCIS), ER/MA negative, Her2 negative. She underwent right partial mastectomy and SLNB on 6/19/23, with no residual invasive disease in the surgical specimen only DCIS, [id number], anatomic stage IA, prognostic stage IA.       Plan:  -cont with Dr. Zamora  -cont with Dr. Thomson  -I reassured her that she has an excellent prognosis, especially if she follows traditional medicine recommendations. I have asked her to hold off on any alternative medicine treatments until she sees the above doctors.  -cont with Lymphedema clinic  -mammo and exam in [address] [place] [address] for support    KELLY Herman      CC:  MD Kira Squires APRN Mollie Cartwright, MD

## 2024-10-07 ENCOUNTER — OFFICE VISIT (OUTPATIENT)
Dept: SURGERY | Facility: CLINIC | Age: 64
End: 2024-10-07
Payer: COMMERCIAL

## 2024-10-07 VITALS
HEART RATE: 82 BPM | HEIGHT: 62 IN | OXYGEN SATURATION: 97 % | DIASTOLIC BLOOD PRESSURE: 80 MMHG | SYSTOLIC BLOOD PRESSURE: 124 MMHG | BODY MASS INDEX: 30 KG/M2 | WEIGHT: 163 LBS

## 2024-10-07 DIAGNOSIS — C50.911 DUCTAL CARCINOMA IN SITU (DCIS) OF RIGHT BREAST WITH MICROINVASIVE COMPONENT: Primary | ICD-10-CM

## 2024-10-07 DIAGNOSIS — Z12.31 ENCOUNTER FOR SCREENING MAMMOGRAM FOR MALIGNANT NEOPLASM OF BREAST: ICD-10-CM

## 2024-10-07 PROCEDURE — 99213 OFFICE O/P EST LOW 20 MIN: CPT | Performed by: NURSE PRACTITIONER

## 2024-12-04 ENCOUNTER — TELEPHONE (OUTPATIENT)
Dept: ONCOLOGY | Facility: CLINIC | Age: 64
End: 2024-12-04
Payer: COMMERCIAL

## 2024-12-04 NOTE — TELEPHONE ENCOUNTER
Spoke with pt and rescheduled appt per pt request. Pt confirmed new date time and location for appt

## 2024-12-04 NOTE — TELEPHONE ENCOUNTER
"  Caller: Gaby Dobbins \"Sabra\"    Relationship to patient: Self    Best call back number: [phone]    Chief complaint: PATIENT CALLED TO RESCHEDULE     Type of visit: VITALS AND FOLLOW UP    Requested date: JAN 20-23 IF AVAILABLE      If rescheduling, when is the original appointment: 1-31-25     "

## 2025-01-21 ENCOUNTER — OFFICE VISIT (OUTPATIENT)
Dept: OTHER | Facility: HOSPITAL | Age: 65
End: 2025-01-21
Payer: COMMERCIAL

## 2025-01-21 ENCOUNTER — OFFICE VISIT (OUTPATIENT)
Dept: ONCOLOGY | Facility: CLINIC | Age: 65
End: 2025-01-21
Payer: COMMERCIAL

## 2025-01-21 VITALS
HEART RATE: 94 BPM | OXYGEN SATURATION: 98 % | RESPIRATION RATE: 16 BRPM | DIASTOLIC BLOOD PRESSURE: 81 MMHG | SYSTOLIC BLOOD PRESSURE: 130 MMHG | WEIGHT: 160.8 LBS | BODY MASS INDEX: 29.59 KG/M2 | TEMPERATURE: 98.2 F | HEIGHT: 62 IN

## 2025-01-21 DIAGNOSIS — C50.911 DUCTAL CARCINOMA IN SITU (DCIS) OF RIGHT BREAST WITH MICROINVASIVE COMPONENT: ICD-10-CM

## 2025-01-21 DIAGNOSIS — C50.911 DUCTAL CARCINOMA IN SITU (DCIS) OF RIGHT BREAST WITH MICROINVASIVE COMPONENT: Primary | ICD-10-CM

## 2025-01-21 LAB
25(OH)D3 SERPL-MCNC: 85.5 NG/ML (ref 30–100)
ALBUMIN SERPL-MCNC: 4.2 G/DL (ref 3.5–5.2)
ALBUMIN/GLOB SERPL: 1.3 G/DL
ALP SERPL-CCNC: 104 U/L (ref 39–117)
ALT SERPL W P-5'-P-CCNC: 16 U/L (ref 1–33)
ANION GAP SERPL CALCULATED.3IONS-SCNC: 10.1 MMOL/L (ref 5–15)
AST SERPL-CCNC: 21 U/L (ref 1–32)
BASOPHILS # BLD AUTO: 0.04 10*3/MM3 (ref 0–0.2)
BASOPHILS NFR BLD AUTO: 0.6 % (ref 0–1.5)
BILIRUB SERPL-MCNC: 0.2 MG/DL (ref 0–1.2)
BUN SERPL-MCNC: 25 MG/DL (ref 8–23)
BUN/CREAT SERPL: 36.2 (ref 7–25)
CALCIUM SPEC-SCNC: 9.9 MG/DL (ref 8.6–10.5)
CHLORIDE SERPL-SCNC: 104 MMOL/L (ref 98–107)
CO2 SERPL-SCNC: 26.9 MMOL/L (ref 22–29)
CREAT SERPL-MCNC: 0.69 MG/DL (ref 0.57–1)
DEPRECATED RDW RBC AUTO: 40.1 FL (ref 37–54)
EGFRCR SERPLBLD CKD-EPI 2021: 98.3 ML/MIN/1.73
EOSINOPHIL # BLD AUTO: 0.3 10*3/MM3 (ref 0–0.4)
EOSINOPHIL NFR BLD AUTO: 4.6 % (ref 0.3–6.2)
ERYTHROCYTE [DISTWIDTH] IN BLOOD BY AUTOMATED COUNT: 12.6 % (ref 12.3–15.4)
GLOBULIN UR ELPH-MCNC: 3.2 GM/DL
GLUCOSE SERPL-MCNC: 96 MG/DL (ref 65–99)
HCT VFR BLD AUTO: 39.6 % (ref 34–46.6)
HGB BLD-MCNC: 12.9 G/DL (ref 12–15.9)
IMM GRANULOCYTES # BLD AUTO: 0.01 10*3/MM3 (ref 0–0.05)
IMM GRANULOCYTES NFR BLD AUTO: 0.2 % (ref 0–0.5)
LYMPHOCYTES # BLD AUTO: 1.35 10*3/MM3 (ref 0.7–3.1)
LYMPHOCYTES NFR BLD AUTO: 20.7 % (ref 19.6–45.3)
MCH RBC QN AUTO: 28.2 PG (ref 26.6–33)
MCHC RBC AUTO-ENTMCNC: 32.6 G/DL (ref 31.5–35.7)
MCV RBC AUTO: 86.7 FL (ref 79–97)
MONOCYTES # BLD AUTO: 0.78 10*3/MM3 (ref 0.1–0.9)
MONOCYTES NFR BLD AUTO: 12 % (ref 5–12)
NEUTROPHILS NFR BLD AUTO: 4.04 10*3/MM3 (ref 1.7–7)
NEUTROPHILS NFR BLD AUTO: 61.9 % (ref 42.7–76)
NRBC BLD AUTO-RTO: 0 /100 WBC (ref 0–0.2)
PLATELET # BLD AUTO: 263 10*3/MM3 (ref 140–450)
PMV BLD AUTO: 10.8 FL (ref 6–12)
POTASSIUM SERPL-SCNC: 3.9 MMOL/L (ref 3.5–5.2)
PROT SERPL-MCNC: 7.4 G/DL (ref 6–8.5)
RBC # BLD AUTO: 4.57 10*6/MM3 (ref 3.77–5.28)
SODIUM SERPL-SCNC: 141 MMOL/L (ref 136–145)
WBC NRBC COR # BLD AUTO: 6.52 10*3/MM3 (ref 3.4–10.8)

## 2025-01-21 PROCEDURE — 99214 OFFICE O/P EST MOD 30 MIN: CPT | Performed by: INTERNAL MEDICINE

## 2025-01-21 PROCEDURE — 82306 VITAMIN D 25 HYDROXY: CPT | Performed by: INTERNAL MEDICINE

## 2025-01-21 PROCEDURE — 85025 COMPLETE CBC W/AUTO DIFF WBC: CPT | Performed by: INTERNAL MEDICINE

## 2025-01-21 PROCEDURE — 36415 COLL VENOUS BLD VENIPUNCTURE: CPT

## 2025-01-21 PROCEDURE — 80053 COMPREHEN METABOLIC PANEL: CPT | Performed by: INTERNAL MEDICINE

## 2025-01-21 NOTE — PROGRESS NOTES
Subjective   Gaby Dobbins is a 64 y.o. female. Referred by Dr. Mojica for microinvasive carcinoma of the right breast.    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  She denies palpating any abnormal masses in either breast, skin or nipple changes prior to this.    No family history of breast or ovarian carcinoma.    3/20/2023-bilateral screening mammogram  Calcifications in the right breast require additional evaluation.  Magnification is recommended.    Right breast diagnostic mammogram  New calcifications in the right breast are suspicious.  Stereotactic biopsy is recommended.    5/15/2023-right breast stereoactic biopsy  Pathology consistent with high-grade solid ductal carcinoma in situ with rare foci of irregular ductal clusters.  Highly suspicious for invasive carcinoma.  DCIS-high-grade, size 3 mm  Microcalcifications are present in DCIS  DCIS is ER negative  DC negative  Ki-67 88.66%    Pathology reviewed at [facility]  Right breast 8:00 stereotactic biopsy consistent with microinvasive carcinoma 0.9 mm arising out of a background of high-grade ductal carcinoma in situ with a brisk lymphocytic response.  ER negative, DC negative    Invitae 47 gene panel negative    5/19/2023-bilateral breast MRI-3.4 cm biopsy cavity at 7:00 in the posterior right breast represents biopsy-proven malignancy.  No suspicious enhancement is identified at the biopsy site and the biopsy clip is laterally displaced.  Surgical management is recommended.  No MRI evidence of malignancy in the left breast.     6/19/2023-right breast lumpectomy  High-grade ductal carcinoma in situ measuring 12 mm  Margins are negative for in situ carcinoma  2 sentinel lymph nodes negative  ER negative  DC negative  HER2 1+    pT1 MI N0 M0    Patient has met with radiation oncology and plans to start radiation  7/25/2023.    Completed adjuvant radiation.    Interval History  Patient is reviewed back today in 6-month follow-up.    She was previously having back pain in March 2024 and an MRI of the spine was performed which was negative.  She has done physical therapy, pain management and subsequently the back pain has improved.  She denies any new breast masses.  Denies any other new areas of pain.  No nausea or vomiting.  No other complaints at this time.  Her vitamin D was noted to be elevated at 110 and has not been rechecked since September 2024.  Patient has quit taking her supplements in the past 6 months.      The following portions of the patient's history were reviewed and updated as appropriate: allergies, current medications, past family history, past medical history, past social history, past surgical history, and problem list.    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    Migraines         Past Surgical History:   Procedure Laterality Date    BLEPHAROPLASTY Left     LOWER    BREAST LUMPECTOMY WITH SENTINEL NODE BIOPSY Right 6/19/2023    Procedure: Right needle-localized, bracketed, partial mastectomy and sentinel lymph node biopsy;  Surgeon: Kristin Mojica MD;  Location: [facility];  Service: General;  Laterality: Right;    KIDNEY STONE SURGERY  2000    KNEE MENISCAL REPAIR          Family History   Problem Relation Age of Onset    Hypertension Father     Malig Hyperthermia Neg Hx         Social History     Socioeconomic History    Marital status:      Spouse name: Dewey    Number of children: 2   Tobacco Use    Smoking status: Never     Passive exposure: Never    Smokeless tobacco: Never   Vaping Use    Vaping status: Never Used   Substance and Sexual Activity    Alcohol use: Yes     Comment: SOCIAL    Drug use: Never    Sexual activity: Defer        OB History    No obstetric history on file.       "Age at menarche-14  Age at first live childbirth-32   2 para 2  0  Breast-feeding-6 to 8 weeks  Age at menopause-in her early 50s  Oral conceptive pill use for greater than 25 years  No use of hormone replacement    Allergies   Allergen Reactions    Moxifloxacin Hives        Review of Systems   Constitutional: Negative.    HENT: Negative.     Eyes: Negative.    Respiratory: Negative.     Cardiovascular: Negative.    Gastrointestinal: Negative.    Endocrine: Negative.    Genitourinary: Negative.  Positive for amenorrhea.   Musculoskeletal: Negative.    Skin: Negative.    Allergic/Immunologic: Negative.    Neurological: Negative.    Hematological: Negative.      Review of systems as mentioned HPI otherwise negative    Objective   Blood pressure 130/81, pulse 94, temperature 98.2 °F (36.8 °C), temperature source Oral, resp. rate 16, height 157 cm (61.81\"), weight 72.9 kg (160 lb 12.8 oz), SpO2 98%.   Physical Exam  Constitutional:       Appearance: Normal appearance. She is normal weight.   HENT:      Head: Normocephalic and atraumatic.      Right Ear: External ear normal.      Left Ear: External ear normal.      Nose: Nose normal.      Mouth/Throat:      Pharynx: Oropharynx is clear.   Eyes:      Conjunctiva/sclera: Conjunctivae normal.   Cardiovascular:      Rate and Rhythm: Normal rate.   Pulmonary:      Effort: Pulmonary effort is normal.   Abdominal:      General: Abdomen is flat.   Musculoskeletal:         General: Normal range of motion.      Cervical back: Normal range of motion.   Skin:     General: Skin is warm.   Neurological:      General: No focal deficit present.      Mental Status: She is alert and oriented to person, place, and time.   Psychiatric:         Mood and Affect: Mood normal.         Behavior: Behavior normal.         Thought Content: Thought content normal.         Judgment: Judgment normal.     Breast exam-left breast appears normal on inspection.  No palpable abnormalities of " the left breast  Right breast on inspection there is a well-healed scar in the lower outer quadrant as well as in the right axilla.  Underlying scar tissue but no other palpable abnormalities.  The right breast tissue is somewhat more firm likely due to radiation    I have reexamined the patient and the results are consistent with the previously documented exam. Justine Zamora MD                           No visits with results within 30 Day(s) from this visit.   Latest known visit with results is:   Office Visit on 09/03/2024   Component Date Value Ref Range Status    Total Cholesterol 09/09/2024 239 (H)  0 - 200 mg/dL Final    Comment: Cholesterol Reference Ranges  (U.S. Department of Health and Human Services ATP III  Classifications)  Desirable          <200 mg/dL  Borderline High    200-239 mg/dL  High Risk          >240 mg/dL  Triglyceride Reference Ranges  (U.S. Department of Health and Human Services ATP III  Classifications)  Normal           <150 mg/dL  Borderline High  150-199 mg/dL  High             200-499 mg/dL  Very High        >500 mg/dL  HDL Reference Ranges  (U.S. Department of Health and Human Services ATP III  Classifications)  Low     <40 mg/dl (major risk factor for CHD)  High    >60 mg/dl ('negative' risk factor for CHD)  LDL Reference Ranges  (U.S. Department of Health and Human Services ATP III  Classifications)  Optimal          <100 mg/dL  Near Optimal     100-129 mg/dL  Borderline High  130-159 mg/dL  High             160-189 mg/dL  Very High        >189 mg/dL      Triglycerides 09/09/2024 115  0 - 150 mg/dL Final    HDL Cholesterol 09/09/2024 65 (H)  40 - 60 mg/dL Final    VLDL Cholesterol Ac 09/09/2024 20  5 - 40 mg/dL Final    LDL Chol Calc (NIH) 09/09/2024 154 (H)  0 - 100 mg/dL Final    LDL/HDL RATIO 09/09/2024 2.32   Final    WBC 09/09/2024 5.06  3.40 - 10.80 10*3/mm3 Final    RBC 09/09/2024 4.64  3.77 - 5.28 10*6/mm3 Final    Hemoglobin 09/09/2024 13.2  12.0 - 15.9 g/dL Final     Hematocrit 09/09/2024 39.5  34.0 - 46.6 % Final    MCV 09/09/2024 85.1  79.0 - 97.0 fL Final    MCH 09/09/2024 28.4  26.6 - 33.0 pg Final    MCHC 09/09/2024 33.4  31.5 - 35.7 g/dL Final    RDW 09/09/2024 13.0  12.3 - 15.4 % Final    Platelets 09/09/2024 273  140 - 450 10*3/mm3 Final    Neutrophil Rel % 09/09/2024 59.1  42.7 - 76.0 % Final    Lymphocyte Rel % 09/09/2024 25.1  19.6 - 45.3 % Final    Monocyte Rel % 09/09/2024 9.3  5.0 - 12.0 % Final    Eosinophil Rel % 09/09/2024 5.9  0.3 - 6.2 % Final    Basophil Rel % 09/09/2024 0.4  0.0 - 1.5 % Final    Neutrophils Absolute 09/09/2024 2.99  1.70 - 7.00 10*3/mm3 Final    Lymphocytes Absolute 09/09/2024 1.27  0.70 - 3.10 10*3/mm3 Final    Monocytes Absolute 09/09/2024 0.47  0.10 - 0.90 10*3/mm3 Final    Eosinophils Absolute 09/09/2024 0.30  0.00 - 0.40 10*3/mm3 Final    Basophils Absolute 09/09/2024 0.02  0.00 - 0.20 10*3/mm3 Final    Immature Granulocyte Rel % 09/09/2024 0.2  0.0 - 0.5 % Final    Immature Grans Absolute 09/09/2024 0.01  0.00 - 0.05 10*3/mm3 Final    nRBC 09/09/2024 0.0  0.0 - 0.2 /100 WBC Final    Glucose 09/09/2024 92  65 - 99 mg/dL Final    BUN 09/09/2024 24 (H)  8 - 23 mg/dL Final    Creatinine 09/09/2024 0.74  0.57 - 1.00 mg/dL Final    EGFR Result 09/09/2024 91.6  >60.0 mL/min/1.73 Final    Comment: GFR Normal >60  Chronic Kidney Disease <60  Kidney Failure <15      BUN/Creatinine Ratio 09/09/2024 32.4 (H)  7.0 - 25.0 Final    Sodium 09/09/2024 140  136 - 145 mmol/L Final    Potassium 09/09/2024 4.4  3.5 - 5.2 mmol/L Final    Chloride 09/09/2024 103  98 - 107 mmol/L Final    Total CO2 09/09/2024 25.4  22.0 - 29.0 mmol/L Final    Calcium 09/09/2024 9.7  8.6 - 10.5 mg/dL Final    Total Protein 09/09/2024 6.9  6.0 - 8.5 g/dL Final    Albumin 09/09/2024 4.3  3.5 - 5.2 g/dL Final    Globulin 09/09/2024 2.6  gm/dL Final    A/G Ratio 09/09/2024 1.7  g/dL Final    Total Bilirubin 09/09/2024 0.5  0.0 - 1.2 mg/dL Final    Alkaline Phosphatase 09/09/2024  90  39 - 117 U/L Final    AST (SGOT) 09/09/2024 31  1 - 32 U/L Final    ALT (SGPT) 09/09/2024 20  1 - 33 U/L Final    TSH 09/09/2024 1.760  0.270 - 4.200 uIU/mL Final    25 Hydroxy, Vitamin D 09/09/2024 110.0 (H)  30.0 - 100.0 ng/ml Final    Comment: Reference Range for Total Vitamin D 25(OH)  Deficiency <20.0 ng/mL  Insufficiency 21-29 ng/mL  Sufficiency  ng/mL  Toxicity >100 ng/ml      Hep C Virus Ab 09/09/2024 Non Reactive  Non Reactive Final    Comment: HCV antibody alone does not differentiate between previously  resolved infection and active infection. Equivocal and Reactive  HCV antibody results should be followed up with an HCV RNA test  to support the diagnosis of active HCV infection.      SARS Antigen 09/03/2024 Detected (A)  Not Detected, Presumptive Negative Final    Influenza A Antigen MELANIE 09/03/2024 Not Detected  Not Detected Final    Influenza B Antigen MELANIE 09/03/2024 Not Detected  Not Detected Final    Internal Control 09/03/2024 Passed  Passed Final    Lot Number 09/03/2024 [id number]   Final    Expiration Date 09/03/2024 05/25/2025   Final    Unable to Void 09/09/2024 Comment   Final    Comment: The patient was not able to render a urine sample and has been  instructed to return for a urine collection at their earliest  convenience.  The urine testing that you have requested has  been deleted from this report.  When the patient returns and  provides a urine specimen, the urine testing will be performed  and separately reported.          No radiology results for the last 30 days.       Assessment & Plan       *Right breast microinvasive ductal carcinoma and DCIS  PT 1 MI N0 M0, tumor is triple negative with a Ki-67 of over 80%, anatomic stage Ia and prognostic stage Ia.  Tumor was arising in a background of high-grade ductal carcinoma in situ   Status post right lumpectomy on 6/19/2023, margins negative, 2 sentinel lymph nodes negative  Completed adjuvant radiation to the right breast  No  indication for endocrine therapy given triple negative nature of disease.  4/2024 screening mammogram negative  1/21/2025-no evidence of recurrent disease.  Due for screening mammogram in April 2025    *History of back pain  New since the start of radiation  Limiting her physical activity  Continue physical therapy  Referral to pain management will be made   This has completely resolved.    *Weight gain since breast cancer diagnosis.  8/1/2024 patient planning to make follow-up with her PCP for her annual physical.  For convenience we will go ahead and draw labs today including some of which previously ordered by the survivorship clinic but not performed.    *Elevated vitamin D levels  Repeat vitamin D level 1/21/2025 pending  CBC and CMP within normal limits except for slightly elevated BUN.  Follow-up on vitamin D levels.    PLAN:  Continue with surveillance every 6 months  Follow-up on vitamin D level.  Recommend that she stay off vitamin D at this time.

## 2025-01-24 ENCOUNTER — TELEPHONE (OUTPATIENT)
Dept: ONCOLOGY | Facility: CLINIC | Age: 65
End: 2025-01-24
Payer: COMMERCIAL

## 2025-01-24 NOTE — TELEPHONE ENCOUNTER
----- Message from Justine Zamora sent at 1/23/2025  5:29 AM EST -----  Pls let pt know that Vit D is normal but still not resume MV.  ----- Message -----  From: Lab, Background User  Sent: 1/21/2025   8:17 PM EST  To: Justine Zamora MD

## 2025-01-24 NOTE — TELEPHONE ENCOUNTER
Reviewed Vitamin D level with Sabra, and instructions per Dr Zamora to continue holding her multivitamin. She voiced understanding.

## 2025-04-07 ENCOUNTER — APPOINTMENT (OUTPATIENT)
Dept: WOMENS IMAGING | Facility: HOSPITAL | Age: 65
End: 2025-04-07
Payer: COMMERCIAL

## 2025-04-07 PROCEDURE — 77067 SCR MAMMO BI INCL CAD: CPT | Performed by: RADIOLOGY

## 2025-04-07 PROCEDURE — 77063 BREAST TOMOSYNTHESIS BI: CPT | Performed by: RADIOLOGY

## 2025-04-11 NOTE — PROGRESS NOTES
[facility]     Referring Provider: No ref. provider found     Chief complaint: Rt breast cancer follow up     Subjective   HPI:   5/31/2023:  Ms. Gaby Dobbins is a 64 yo woman, seen at the request of Dr. Graciela Angulo, for a new diagnosis of right breast cancer. This was initially detected as an imaging abnormality on routine screening. Her work-up is detailed in the oncologic history below. Prior to the biopsy, she denies any breast lumps, pain, skin changes, or nipple discharge. She denies any prior history of abnormal mammograms or breast biopsies. Her most recent mammogram prior to this year was in 2016. She denies any family history of breast or ovarian cancer.     7/5/2023  She underwent right partial mastectomy and SLNB on 6/19/23. See surgery & pathology details below in oncologic history. She has been recovering well and has no complaints.  She is very interested in alternative medicine options and wants me to sign off on receiving high-dose IV vitamin C from a wellness center.  She was joined today in clinic by her .      10/3/2023   Pt presenting to the office today for routine follow up.  She completely radiation in August. She saw oncology in July, it was not recommended to have adjuvant endocrine therapy or chemotherapy due to triple negative status. She has no new breast complaints or concerns today. SHe saw PT in July with a normal bio impedence score.     4/8/2024   Patient presenting to the office today for routine follow-up.  On 4/2/2024 she had a bilateral screening mammogram that resulted as BI-RADS 2.  She last saw oncology in January with no changes made to the treatment plan.  She has no new breast complaints or concerns today.    10/7/2024   Patient presenting to the office today for routine follow-up.  She last saw oncology in August with no changes made to the treatment plan.  She is doing well without complaints today.    4/14/2025 interval history  Patient  presented to the office today for routine follow-up.  4/7/2025 she had a bilateral screening mammogram resulted as BI-RADS 2.  She last saw her oncologist in January no changes were made to the treatment plan.  She has no new breast complaints or concerns today.    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  There is a stable very small, oval mass measuring 3 mm seen in the sub areolar region of the left breast. No suspicious masses, suspicious microcalcifications or new areas of architectural distortion are identified. There has been no significant change from the prior exam(s).  In the right breast, no suspicious masses, significant calcifications or other abnormalities are seen.  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  There are calcifications with linear distribution seen in the posterior one-third central region of the right breast.  In the left breast, no suspicious masses, significant calcifications or other abnormalities are seen.  BI-RADS 0: Incomplete     5/1/2023 Imaging    Right Diagnostic MMG with [facility] ([facility])  On the present examination, there are multiple new indistinct calcifications with linear distribution in the posterior one third of the right breast at 8 o'clock, central located 8 cm from the nipple. This spans approximately 2 cm. Additional imaging demonstrates microcalcifications are suspicious and tissue sampling is recommended.  BI-RADS 4B: Suspicious     5/15/2023 Biopsy    Right Breast, Stereotactic Biopsy ([facility]):    Right Breast, 8:00, stereotactic core biopsies   High grade solid ductal carcinoma in situ (DCIS) with  rare foci of irregular ductal clusters, Highly suspicious for invasive carcinoma.     Ductal carcinoma in situ (DCIS)    Nuclear grade: high    Architectural pattern(s): solid    Necrosis: not identified    Size: 3mm   Microcalcifications: present in DCIS    ER negative (0%)  WV negative (0%)  Ki-67 88.66%  Her2 negative (IHC 1+)    -Top hat clip.  A 2 view postprocedure mammogram demonstrated the marker clip to have migrated approximately 2 cm lateral on the cc view from the expected biopsy site and a small hematoma.  Residual calcifications are present.    [facility] PATHOLOGY REVIEW    1. Right Breast, 8:00, Stereotactic-Guided Core Needle Biopsy:               A. MICROINVASIVE CARCINOMA, 0.9 MM , ARISING OUT OF A BACKGROUND OF HIGH GRADE                   DUCTAL CARCINOMA IN SITU (DCIS) WITH A BRISK LYMPHOCYTIC RESPONSE, Solid type with       single cell necrosis and calcifications.               B. Received immunostains performed at the outside institution shows the following from blocks A, B, and D with the following results (all controls reacted appropriately).                            P63:  Highlights an intact myoepithelial cell layer around the in-situ carcinoma with focal loss in 1D [facility]:  Highlights an intact myoepithelial cell layer around the in-situ carcinoma with focal loss in 1D               C. Repeat P63 immunostain as well as cytokeratin immunostain performed on unstained slides provided for block D shows the following (all controls reacted appropriately):                            P63: Intact myoepithelial cell layer in the area of in situ carcinoma and loss in the area of                            microinvasive carcinoma                            AE1/AE3: Highlights the irregularity of malignant cell groups               D. See comment.    Comment:  We agree with the outside diagnosis rendered. Per outside report, hormone receptor studies were performed on block B to show  the following:      ER:  Negative (0)  NE:  Negative (0)  Ki67:  88.66%     HER2 immunohistochemistry has been requested to be performed at the original institution on block D. Results to follow and be scanned separately into the patient's electronic medical record.      5/19/2023 Imaging    Bilateral Breast MRI ([facility]):  RIGHT BREAST:    At 7:00 in the posterior right breast, 7 cm posterior to the nipple, there is a 2.2 cm AP dimension, 3.4 cm transverse dimension, 1.0 cm craniocaudal dimension T2 hyperintense biopsy cavity with thin peripheral enhancement. There is a focus of susceptibility from a biopsy clip within the lateral aspect of the cavity, which was noted to be laterally displaced on the post biopsy mammogram. Additional susceptibility within the cavity likely reflects residual air from recent biopsy. No suspicious enhancement is identified at the biopsy site or elsewhere within the right breast.  No suspicious enhancement is identified in the right nipple or chest wall.   The visualized axilla is within normal limits.   LEFT BREAST:    No suspicious enhancing mass or area of non-mass enhancement is identified.  The visualized axilla is within normal limits.   EXTRAMAMMARY FINDINGS:   There are no pathologically enlarged internal mammary chain lymph nodes on either side.     BI-RADS 6: Known malignancy.     5/31/2023 Genetic Testing    [employer] Common Hereditary Cancers Panel (47 genes):    Negative     6/1/2023 Imaging    Right Diagnostic MMG with [facility] ([facility]):  There is a top hat clip in the outer central posterior right breast from recent biopsy, which is located within the lateral aspect of an approximately 3.5 cm transverse oriented linear biopsy tract.  Approximately 1.6 cm faint residual calcifications are identified within the medial aspect of the tract and slightly medial and anterior to the tract on the CC view. Additional calcifications were previously noted  extending approximately 0.9 cm  lateral to the most posterolateral calcification seen on the current examination, which were previously biopsied. This area has been marked and should be included at the time of excision.  The biopsy site, including the clip and calcifications, were difficult to include in the field-of-view on lateral and MLO spot magnification views due to the posterior location. Residual calcifications are  visualized predominantly posterior to the top hat clip on the lateral spot magnification view and predominantly inferior to the clip on the full field MLO view. Differences in positioning of the clip relative to the residual calcifications is likely due to differences in patient positioning. Residual calcifications measure approximately 1.2 cm on the MLO view.  BI-RADS 6: Known malignancy.     6/19/2023 Surgery    Right needle-localized, bracketed, partial mastectomy and sentinel lymph node biopsy    1. Right Breast, Double Needle-Localized Partial Mastectomy (39 Grams):               A. HIGH GRADE DUCTAL CARCINOMA IN-SITU (DCIS):                            1. Solid type with calcifications.                            2. Extent of DCIS:  12 mm (based on slices involved).                            3. Margins are negative for in-situ carcinoma; Closest distance:  DCIS is present 2.5 mm from the inferior margin (superseded by specimen #3).               B. Clip and biopsy site changes are present and adjacent to in-situ carcinoma.               C. See synoptic report (to include parts 2-5) and Comments.     2. Right Breast, Additional Anterior, Superior and Medial Margins, Re-excision:               A. Breast parenchyma with no significant histopathologic changes       (additional 6 mm of tissue free of carcinoma).     3. Right Breast, Additional Inferior and Lateral Margins, Re-excision:               A. Breast parenchyma with focal biopsy site changes                    (additional 16 mm of tissue free of carcinoma).      4.  Right Axillary Regan Lymph Node #1, Hot and Blue, Count 3,600, Biopsy:                 A. One lymph node, negative for carcinoma (0/1).     5. Right Axillary Regan Lymph Node #2, Hot and Blue, Count 1,000, Biopsy:                 A. One lymph node, negative for carcinoma (0/1).     7/25/2023 - 8/21/2023 Radiation    Radiation OncologyTreatment Course:  Gaby Dobbins received 5056 cGy in 20 fractions to right breast with tumor bed boost     4/2/2024 Imaging    Bilateral screening mammogram at [facility]    There are scattered areas of fibroglandular density.    Finding 1:  There is a post-surgical scar seen in the superficial central region of the right breast. Post-surgical  scar is in an area of prior lumpectomy.  Findings are consistent with post surgical and post radiation therapy  changes.  This is new since prior exam(s).    Finding 2:  There is a new post-surgical scar in the right axilla. Post-surgical scar is in an area of prior  sentinel node biopsy.  Metallic surgical clips noted.    In the left breast, no suspicious masses, significant calcifications or other abnormalities are seen.    IMPRESSION:  Finding 1:  Post-surgical scar in the superficial central region of the right breast is benign-negative.    Finding 2:  New post-surgical scar in the right axilla is benign-negative.    Screening mammogram in 1 year is recommended  BI-RADS Category 2: Benign Finding(s       4/7/2025 Imaging    Bilateral screening mammogram at [facility]  There are scattered areas of fibroglandular density.    Previous screening shows a post-surgical scar in the right breast, central. On the present examination, there is a  post-surgical scar in the right breast. Post-surgical scar is in an area of prior lumpectomy.  Interval decreased  skin thickening from prior is seen. There are no suspicious masses, calcifications or architectural distortions.    In the left breast, no suspicious masses, significant calcifications or other  abnormalities are seen.    IMPRESSION:  Post-surgical scar in the right breast is benign-negative. There is no mammographic evidence of malignancy.    Screening mammogram in 1 year is recommended.    The patient was mailed a notification letter.    BI-RADS Category 2: Benign           Review of Systems:  See interval history.       Medications:    Current Outpatient Medications:     cetirizine (zyrTEC) 10 MG tablet, Take 1 tablet by mouth Daily As Needed for Allergies., Disp: , Rfl:     gabapentin (Neurontin) 100 MG capsule, Take 1 capsule by mouth 3 (Three) Times a Day., Disp: 90 capsule, Rfl: 0    MAGNESIUM PO, Take  by mouth. HOLD FOR SURGERY, Disp: , Rfl:     SUMAtriptan-naproxen (TREXIMET)  MG per tablet, TAKE ONE TABLET BY MOUTH AT ONSET OF HEADACHE. MAY REPEAT AFTER TWO HOURS. MAX TWO DOSES DAILY, Disp: 9 tablet, Rfl: 11    zolpidem (Ambien) 10 MG tablet, Take 1 tablet by mouth At Night As Needed for Sleep. (Patient not taking: Reported on 1/21/2025), Disp: 10 tablet, Rfl: 0      Allergies   Allergen Reactions    Moxifloxacin Hives       Family History   Problem Relation Age of Onset    Hypertension Father     Malig Hyperthermia Neg Hx        Objective   PHYSICAL EXAMINATION:   There were no vitals filed for this visit.        ECOG 0 - Asymptomatic  General: NAD, well appearing  Psych: a&o x3, normal mood and affect  Eyes: EOMI, no scleral icterus  ENMT: neck supple without masses or thyromegaly, mucous membranes moist  MSK: normal gait, normal ROM in bilateral shoulders  Lymph nodes: Well-healing right axillary incision with Dermabond intact.  No swelling.  Breast: asymmetric, moderate size, grade 3 ptosis R<L  Right: Well-healed lower outer radial incision.  No masses or nipple abnormalities. Breast is slightly pink and smaller from radiation. Slight edema 3:00-6:00  Left: No visible abnormalities on inspection while seated, with arms raised or hands on hips.  No masses, skin changes or nipple  abnormalities      Assessment & Plan   Assessment:  64 y.o. F with a diagnosis of right breast cancer: Microinvasive carcinoma (0.9 mm) arising in a background of high-grade ductal carcinoma in situ (DCIS), ER/RI negative, Her2 negative. She underwent right partial mastectomy and SLNB on 6/19/23, with no residual invasive disease in the surgical specimen only DCIS, [id number], anatomic stage IA, prognostic stage IA.       Plan:  -cont with Dr. Zamora  -cont with Dr. Thomson  -I reassured her that she has an excellent prognosis, especially if she follows traditional medicine recommendations. I have asked her to hold off on any alternative medicine treatments until she sees the above doctors.  -cont with Lymphedema clinic  -mammo and exam in 1 year   - [facility] for support    KELLY Herman      CC:  No ref. provider found  KELLY Brannon MD

## 2025-04-14 ENCOUNTER — OFFICE VISIT (OUTPATIENT)
Dept: SURGERY | Facility: CLINIC | Age: 65
End: 2025-04-14
Payer: COMMERCIAL

## 2025-04-14 VITALS
HEIGHT: 62 IN | HEART RATE: 98 BPM | BODY MASS INDEX: 29.44 KG/M2 | WEIGHT: 160 LBS | SYSTOLIC BLOOD PRESSURE: 118 MMHG | DIASTOLIC BLOOD PRESSURE: 82 MMHG | OXYGEN SATURATION: 100 %

## 2025-04-14 DIAGNOSIS — C50.911 DUCTAL CARCINOMA IN SITU (DCIS) OF RIGHT BREAST WITH MICROINVASIVE COMPONENT: Primary | ICD-10-CM

## 2025-04-14 DIAGNOSIS — Z12.31 ENCOUNTER FOR SCREENING MAMMOGRAM FOR MALIGNANT NEOPLASM OF BREAST: ICD-10-CM

## 2025-04-14 PROCEDURE — 99213 OFFICE O/P EST LOW 20 MIN: CPT | Performed by: NURSE PRACTITIONER

## 2025-07-19 NOTE — PROGRESS NOTES
Subjective   Gaby Dobbins is a 65 y.o. female. Referred by Dr. Mojica for microinvasive carcinoma of the right breast.    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  She denies palpating any abnormal masses in either breast, skin or nipple changes prior to this.    No family history of breast or ovarian carcinoma.    3/20/2023-bilateral screening mammogram  Calcifications in the right breast require additional evaluation.  Magnification is recommended.    Right breast diagnostic mammogram  New calcifications in the right breast are suspicious.  Stereotactic biopsy is recommended.    5/15/2023-right breast stereoactic biopsy  Pathology consistent with high-grade solid ductal carcinoma in situ with rare foci of irregular ductal clusters.  Highly suspicious for invasive carcinoma.  DCIS-high-grade, size 3 mm  Microcalcifications are present in DCIS  DCIS is ER negative  IN negative  Ki-67 88.66%    Pathology reviewed at [facility]  Right breast 8:00 stereotactic biopsy consistent with microinvasive carcinoma 0.9 mm arising out of a background of high-grade ductal carcinoma in situ with a brisk lymphocytic response.  ER negative, IN negative    Invitae 47 gene panel negative    5/19/2023-bilateral breast MRI-3.4 cm biopsy cavity at 7:00 in the posterior right breast represents biopsy-proven malignancy.  No suspicious enhancement is identified at the biopsy site and the biopsy clip is laterally displaced.  Surgical management is recommended.  No MRI evidence of malignancy in the left breast.     6/19/2023-right breast lumpectomy  High-grade ductal carcinoma in situ measuring 12 mm  Margins are negative for in situ carcinoma  2 sentinel lymph nodes negative  ER negative  IN negative  HER2 1+    pT1 MI N0 M0    Patient has met with radiation oncology and plans to start radiation  2023.    Completed adjuvant radiation.    Interval History  Patient is reviewed back today in 6-month follow-up.  She has been doing well.  Appetite adequate.  Bowels moving regularly.  Denies nausea, vomiting, abdominal pain, bone pain, cough or shortness of breath.  Denies any new breast mass.    Screening mammogram 2025, benign.     She would like to start 1 Neutrofol for hair growth, was on this previously.  She would also like to start B complex vitamin.    The following portions of the patient's history were reviewed and updated as appropriate: allergies, current medications, past family history, past medical history, past social history, past surgical history, and problem list.    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     Migraines         Past Surgical History:   Procedure Laterality Date    BLEPHAROPLASTY Left     LOWER    BREAST LUMPECTOMY WITH SENTINEL NODE BIOPSY Right 2023    Procedure: Right needle-localized, bracketed, partial mastectomy and sentinel lymph node biopsy;  Surgeon: Kristin Mojica MD;  Location: [facility];  Service: General;  Laterality: Right;    KIDNEY STONE SURGERY      KNEE MENISCAL REPAIR          Family History   Problem Relation Age of Onset    Hypertension Father     Malig Hyperthermia Neg Hx         Social History     Socioeconomic History    Marital status:      Spouse name: Dewey    Number of children: 2   Tobacco Use    Smoking status: Never     Passive exposure: Never    Smokeless tobacco: Never   Vaping Use    Vaping status: Never Used   Substance and Sexual Activity    Alcohol use: Yes     Comment: SOCIAL    Drug use: Never    Sexual activity: Defer        OB History    No obstetric history on file.      Age at menarche-14  Age at first live childbirth-32   2 para 2  0  Breast-feeding-6 to 8 weeks  Age at menopause-in her  "early 50s  Oral conceptive pill use for greater than 25 years  No use of hormone replacement    Allergies   Allergen Reactions    Moxifloxacin Hives      Review of systems as mentioned HPI otherwise negative    Objective   Blood pressure 122/84, pulse 87, temperature 97.6 °F (36.4 °C), temperature source Skin, height 157 cm (61.81\"), weight 74.9 kg (165 lb 3.2 oz), SpO2 95%.     Physical Exam  Constitutional:       Appearance: Normal appearance. She is normal weight.   HENT:      Head: Normocephalic and atraumatic.      Right Ear: External ear normal.      Left Ear: External ear normal.      Nose: Nose normal.      Mouth/Throat:      Pharynx: Oropharynx is clear.   Eyes:      Conjunctiva/sclera: Conjunctivae normal.   Cardiovascular:      Rate and Rhythm: Normal rate.   Pulmonary:      Effort: Pulmonary effort is normal.   Abdominal:      General: Abdomen is flat.   Musculoskeletal:         General: Normal range of motion.      Cervical back: Normal range of motion.   Skin:     General: Skin is warm.   Neurological:      General: No focal deficit present.      Mental Status: She is alert and oriented to person, place, and time.   Psychiatric:         Mood and Affect: Mood normal.         Behavior: Behavior normal.         Thought Content: Thought content normal.         Judgment: Judgment normal.       Patient declines breast exam today, previous Breast exam-left breast appears normal on inspection.  No palpable abnormalities of the left breast  Right breast on inspection there is a well-healed scar in the lower outer quadrant as well as in the right axilla.  Underlying scar tissue but no other palpable abnormalities.  The right breast tissue is somewhat more firm likely due to radiation    Results from last 7 days   Lab Units 07/22/25  1324   WBC 10*3/mm3 6.55   NEUTROS ABS 10*3/mm3 3.89   HEMOGLOBIN g/dL 13.4   HEMATOCRIT % 40.4   PLATELETS 10*3/mm3 212       Results from last 7 days   Lab Units 07/22/25  1324 "   SODIUM mmol/L 142   POTASSIUM mmol/L 4.3   CHLORIDE mmol/L 105   CO2 mmol/L 26.1   BUN mg/dL 23.0   CREATININE mg/dL 0.86   CALCIUM mg/dL 9.9   ALBUMIN g/dL 4.5   BILIRUBIN mg/dL 0.5   ALK PHOS U/L 81   ALT (SGPT) U/L 16   AST (SGOT) U/L 24   GLUCOSE mg/dL 89               Lab on 07/22/2025   Component Date Value Ref Range Status    Glucose 07/22/2025 89  65 - 99 mg/dL Final    BUN 07/22/2025 23.0  8.0 - 23.0 mg/dL Final    Creatinine 07/22/2025 0.86  0.57 - 1.00 mg/dL Final    Sodium 07/22/2025 142  136 - 145 mmol/L Final    Potassium 07/22/2025 4.3  3.5 - 5.2 mmol/L Final    Chloride 07/22/2025 105  98 - 107 mmol/L Final    CO2 07/22/2025 26.1  22.0 - 29.0 mmol/L Final    Calcium 07/22/2025 9.9  8.6 - 10.5 mg/dL Final    Total Protein 07/22/2025 7.4  6.0 - 8.5 g/dL Final    Albumin 07/22/2025 4.5  3.5 - 5.2 g/dL Final    ALT (SGPT) 07/22/2025 16  1 - 33 U/L Final    AST (SGOT) 07/22/2025 24  1 - 32 U/L Final    Alkaline Phosphatase 07/22/2025 81  39 - 117 U/L Final    Total Bilirubin 07/22/2025 0.5  0.0 - 1.2 mg/dL Final    Globulin 07/22/2025 2.9  gm/dL Final    A/G Ratio 07/22/2025 1.6  g/dL Final    BUN/Creatinine Ratio 07/22/2025 26.7 (H)  7.0 - 25.0 Final    Anion Gap 07/22/2025 10.9  5.0 - 15.0 mmol/L Final    eGFR 07/22/2025 76.0  >60.0 mL/min/1.73 Final    WBC 07/22/2025 6.55  3.40 - 10.80 10*3/mm3 Final    RBC 07/22/2025 4.71  3.77 - 5.28 10*6/mm3 Final    Hemoglobin 07/22/2025 13.4  12.0 - 15.9 g/dL Final    Hematocrit 07/22/2025 40.4  34.0 - 46.6 % Final    MCV 07/22/2025 85.8  79.0 - 97.0 fL Final    MCH 07/22/2025 28.5  26.6 - 33.0 pg Final    MCHC 07/22/2025 33.2  31.5 - 35.7 g/dL Final    RDW 07/22/2025 13.5  12.3 - 15.4 % Final    RDW-SD 07/22/2025 42.3  37.0 - 54.0 fl Final    MPV 07/22/2025 11.4  6.0 - 12.0 fL Final    Platelets 07/22/2025 212  140 - 450 10*3/mm3 Final    Neutrophil % 07/22/2025 59.3  42.7 - 76.0 % Final    Lymphocyte % 07/22/2025 26.7  19.6 - 45.3 % Final    Monocyte %  07/22/2025 9.2  5.0 - 12.0 % Final    Eosinophil % 07/22/2025 4.0  0.3 - 6.2 % Final    Basophil % 07/22/2025 0.6  0.0 - 1.5 % Final    Immature Grans % 07/22/2025 0.2  0.0 - 0.5 % Final    Neutrophils, Absolute 07/22/2025 3.89  1.70 - 7.00 10*3/mm3 Final    Lymphocytes, Absolute 07/22/2025 1.75  0.70 - 3.10 10*3/mm3 Final    Monocytes, Absolute 07/22/2025 0.60  0.10 - 0.90 10*3/mm3 Final    Eosinophils, Absolute 07/22/2025 0.26  0.00 - 0.40 10*3/mm3 Final    Basophils, Absolute 07/22/2025 0.04  0.00 - 0.20 10*3/mm3 Final    Immature Grans, Absolute 07/22/2025 0.01  0.00 - 0.05 10*3/mm3 Final    nRBC 07/22/2025 0.0  0.0 - 0.2 /100 WBC Final        No radiology results for the last 30 days.       Assessment & Plan       *Right breast microinvasive ductal carcinoma and DCIS  PT 1 MI N0 M0, tumor is triple negative with a Ki-67 of over 80%, anatomic stage Ia and prognostic stage Ia.  Tumor was arising in a background of high-grade ductal carcinoma in situ   Status post right lumpectomy on 6/19/2023, margins negative, 2 sentinel lymph nodes negative  Completed adjuvant radiation to the right breast  No indication for endocrine therapy given triple negative nature of disease.  4/2025 screening mammogram negative  7/22/2025-no evidence of recurrent disease.  Screening mammogram already scheduled for April 2026.     *History of back pain  New since the start of radiation  Limiting her physical activity  Continue physical therapy  Referral to pain management will be made   This has completely resolved.    *Weight gain since breast cancer diagnosis.  8/1/2024 patient planning to make follow-up with her PCP for her annual physical.  For convenience we will go ahead and draw labs today including some of which previously ordered by the survivorship clinic but not performed.    *Elevated vitamin D levels  Repeat vitamin D level 1/21/2025 pending  CBC and CMP within normal limits except for slightly elevated BUN.  1/21/2025:  Vitamin D 85.5  7/22/2025: Recheck vitamin D level today per patient request.  She remains off vitamin D supplement.     PLAN:   Check vitamin D level today.  Continue with surveillance every 6 months  6 months MD  Screening mammogram already scheduled for April 2026.

## 2025-07-22 ENCOUNTER — OFFICE VISIT (OUTPATIENT)
Dept: ONCOLOGY | Facility: CLINIC | Age: 65
End: 2025-07-22
Payer: COMMERCIAL

## 2025-07-22 ENCOUNTER — LAB (OUTPATIENT)
Dept: OTHER | Facility: HOSPITAL | Age: 65
End: 2025-07-22
Payer: COMMERCIAL

## 2025-07-22 VITALS
OXYGEN SATURATION: 95 % | HEIGHT: 62 IN | WEIGHT: 165.2 LBS | BODY MASS INDEX: 30.4 KG/M2 | SYSTOLIC BLOOD PRESSURE: 122 MMHG | DIASTOLIC BLOOD PRESSURE: 84 MMHG | HEART RATE: 87 BPM | TEMPERATURE: 97.6 F

## 2025-07-22 DIAGNOSIS — C50.911 DUCTAL CARCINOMA IN SITU (DCIS) OF RIGHT BREAST WITH MICROINVASIVE COMPONENT: Primary | ICD-10-CM

## 2025-07-22 DIAGNOSIS — R79.89 HIGH SERUM VITAMIN D: ICD-10-CM

## 2025-07-22 DIAGNOSIS — C50.911 DUCTAL CARCINOMA IN SITU (DCIS) OF RIGHT BREAST WITH MICROINVASIVE COMPONENT: ICD-10-CM

## 2025-07-22 LAB
25(OH)D3 SERPL-MCNC: 74.2 NG/ML (ref 30–100)
ALBUMIN SERPL-MCNC: 4.5 G/DL (ref 3.5–5.2)
ALBUMIN/GLOB SERPL: 1.6 G/DL
ALP SERPL-CCNC: 81 U/L (ref 39–117)
ALT SERPL W P-5'-P-CCNC: 16 U/L (ref 1–33)
ANION GAP SERPL CALCULATED.3IONS-SCNC: 10.9 MMOL/L (ref 5–15)
AST SERPL-CCNC: 24 U/L (ref 1–32)
BASOPHILS # BLD AUTO: 0.04 10*3/MM3 (ref 0–0.2)
BASOPHILS NFR BLD AUTO: 0.6 % (ref 0–1.5)
BILIRUB SERPL-MCNC: 0.5 MG/DL (ref 0–1.2)
BUN SERPL-MCNC: 23 MG/DL (ref 8–23)
BUN/CREAT SERPL: 26.7 (ref 7–25)
CALCIUM SPEC-SCNC: 9.9 MG/DL (ref 8.6–10.5)
CHLORIDE SERPL-SCNC: 105 MMOL/L (ref 98–107)
CO2 SERPL-SCNC: 26.1 MMOL/L (ref 22–29)
CREAT SERPL-MCNC: 0.86 MG/DL (ref 0.57–1)
DEPRECATED RDW RBC AUTO: 42.3 FL (ref 37–54)
EGFRCR SERPLBLD CKD-EPI 2021: 76 ML/MIN/1.73
EOSINOPHIL # BLD AUTO: 0.26 10*3/MM3 (ref 0–0.4)
EOSINOPHIL NFR BLD AUTO: 4 % (ref 0.3–6.2)
ERYTHROCYTE [DISTWIDTH] IN BLOOD BY AUTOMATED COUNT: 13.5 % (ref 12.3–15.4)
GLOBULIN UR ELPH-MCNC: 2.9 GM/DL
GLUCOSE SERPL-MCNC: 89 MG/DL (ref 65–99)
HCT VFR BLD AUTO: 40.4 % (ref 34–46.6)
HGB BLD-MCNC: 13.4 G/DL (ref 12–15.9)
IMM GRANULOCYTES # BLD AUTO: 0.01 10*3/MM3 (ref 0–0.05)
IMM GRANULOCYTES NFR BLD AUTO: 0.2 % (ref 0–0.5)
LYMPHOCYTES # BLD AUTO: 1.75 10*3/MM3 (ref 0.7–3.1)
LYMPHOCYTES NFR BLD AUTO: 26.7 % (ref 19.6–45.3)
MCH RBC QN AUTO: 28.5 PG (ref 26.6–33)
MCHC RBC AUTO-ENTMCNC: 33.2 G/DL (ref 31.5–35.7)
MCV RBC AUTO: 85.8 FL (ref 79–97)
MONOCYTES # BLD AUTO: 0.6 10*3/MM3 (ref 0.1–0.9)
MONOCYTES NFR BLD AUTO: 9.2 % (ref 5–12)
NEUTROPHILS NFR BLD AUTO: 3.89 10*3/MM3 (ref 1.7–7)
NEUTROPHILS NFR BLD AUTO: 59.3 % (ref 42.7–76)
NRBC BLD AUTO-RTO: 0 /100 WBC (ref 0–0.2)
PLATELET # BLD AUTO: 212 10*3/MM3 (ref 140–450)
PMV BLD AUTO: 11.4 FL (ref 6–12)
POTASSIUM SERPL-SCNC: 4.3 MMOL/L (ref 3.5–5.2)
PROT SERPL-MCNC: 7.4 G/DL (ref 6–8.5)
RBC # BLD AUTO: 4.71 10*6/MM3 (ref 3.77–5.28)
SODIUM SERPL-SCNC: 142 MMOL/L (ref 136–145)
WBC NRBC COR # BLD AUTO: 6.55 10*3/MM3 (ref 3.4–10.8)

## 2025-07-22 PROCEDURE — 82306 VITAMIN D 25 HYDROXY: CPT | Performed by: NURSE PRACTITIONER

## 2025-07-22 PROCEDURE — 80053 COMPREHEN METABOLIC PANEL: CPT | Performed by: INTERNAL MEDICINE

## 2025-07-22 PROCEDURE — 99213 OFFICE O/P EST LOW 20 MIN: CPT | Performed by: NURSE PRACTITIONER

## 2025-07-22 PROCEDURE — 85025 COMPLETE CBC W/AUTO DIFF WBC: CPT | Performed by: INTERNAL MEDICINE

## 2025-07-22 PROCEDURE — 36415 COLL VENOUS BLD VENIPUNCTURE: CPT

## 2025-07-24 DIAGNOSIS — G43.009 MIGRAINE WITHOUT AURA AND WITHOUT STATUS MIGRAINOSUS, NOT INTRACTABLE: ICD-10-CM

## 2025-07-24 RX ORDER — SUMATRIPTAN AND NAPROXEN SODIUM 85; 500 MG/1; MG/1
TABLET, FILM COATED ORAL
Qty: 9 TABLET | Refills: 11 | Status: SHIPPED | OUTPATIENT
Start: 2025-07-24